# Patient Record
Sex: MALE | Race: WHITE | NOT HISPANIC OR LATINO | Employment: OTHER | URBAN - NONMETROPOLITAN AREA
[De-identification: names, ages, dates, MRNs, and addresses within clinical notes are randomized per-mention and may not be internally consistent; named-entity substitution may affect disease eponyms.]

---

## 2017-02-20 DIAGNOSIS — N40.0 BENIGN NON-NODULAR PROSTATIC HYPERPLASIA, PRESENCE OF LOWER URINARY TRACT SYMPTOMS UNSPECIFIED: Primary | ICD-10-CM

## 2017-02-21 RX ORDER — FINASTERIDE 5 MG/1
5 TABLET, FILM COATED ORAL DAILY
Qty: 90 TABLET | Refills: 3 | Status: SHIPPED | OUTPATIENT
Start: 2017-02-21 | End: 2018-03-30

## 2017-05-11 ENCOUNTER — LAB (OUTPATIENT)
Dept: LAB | Facility: HOSPITAL | Age: 77
End: 2017-05-11
Attending: INTERNAL MEDICINE

## 2017-05-11 ENCOUNTER — TRANSCRIBE ORDERS (OUTPATIENT)
Dept: ADMINISTRATIVE | Facility: HOSPITAL | Age: 77
End: 2017-05-11

## 2017-05-11 DIAGNOSIS — E11.9 DIABETES MELLITUS WITHOUT COMPLICATION (HCC): ICD-10-CM

## 2017-05-11 DIAGNOSIS — E11.9 DIABETES MELLITUS WITHOUT COMPLICATION (HCC): Primary | ICD-10-CM

## 2017-05-11 DIAGNOSIS — E55.9 UNSPECIFIED VITAMIN D DEFICIENCY: ICD-10-CM

## 2017-05-11 DIAGNOSIS — Z13.6 ENCOUNTER FOR SPECIAL SCREENING EXAMINATION FOR CARDIOVASCULAR DISORDER: ICD-10-CM

## 2017-05-11 DIAGNOSIS — R53.81 MALAISE AND FATIGUE: ICD-10-CM

## 2017-05-11 DIAGNOSIS — Z12.5 SPECIAL SCREENING FOR MALIGNANT NEOPLASM OF PROSTATE: ICD-10-CM

## 2017-05-11 DIAGNOSIS — R53.83 MALAISE AND FATIGUE: ICD-10-CM

## 2017-05-11 DIAGNOSIS — D64.9 ANEMIA, UNSPECIFIED: ICD-10-CM

## 2017-05-11 LAB
ALBUMIN SERPL-MCNC: 4.5 G/DL (ref 3.4–4.8)
ALBUMIN/GLOB SERPL: 1.5 G/DL (ref 1.5–2.5)
ALP SERPL-CCNC: 62 U/L (ref 40–129)
ALT SERPL W P-5'-P-CCNC: 17 U/L (ref 10–44)
ANION GAP SERPL CALCULATED.3IONS-SCNC: 2.2 MMOL/L (ref 3.6–11.2)
AST SERPL-CCNC: 20 U/L (ref 10–34)
BASOPHILS # BLD AUTO: 0.04 10*3/MM3 (ref 0–0.3)
BASOPHILS NFR BLD AUTO: 0.7 % (ref 0–2)
BILIRUB SERPL-MCNC: 0.9 MG/DL (ref 0.2–1.8)
BILIRUB UR QL STRIP: NEGATIVE
BUN BLD-MCNC: 19 MG/DL (ref 7–21)
BUN/CREAT SERPL: 26.4 (ref 7–25)
CALCIUM SPEC-SCNC: 10 MG/DL (ref 7.7–10)
CHLORIDE SERPL-SCNC: 110 MMOL/L (ref 99–112)
CHOLEST SERPL-MCNC: 201 MG/DL (ref 0–200)
CLARITY UR: CLEAR
CO2 SERPL-SCNC: 30.8 MMOL/L (ref 24.3–31.9)
COLOR UR: YELLOW
CREAT BLD-MCNC: 0.72 MG/DL (ref 0.43–1.29)
DEPRECATED RDW RBC AUTO: 45 FL (ref 37–54)
EOSINOPHIL # BLD AUTO: 0.2 10*3/MM3 (ref 0–0.7)
EOSINOPHIL NFR BLD AUTO: 3.5 % (ref 0–7)
ERYTHROCYTE [DISTWIDTH] IN BLOOD BY AUTOMATED COUNT: 13 % (ref 11.5–14.5)
GFR SERPL CREATININE-BSD FRML MDRD: 106 ML/MIN/1.73
GLOBULIN UR ELPH-MCNC: 3.1 GM/DL
GLUCOSE BLD-MCNC: 110 MG/DL (ref 70–110)
GLUCOSE UR STRIP-MCNC: NEGATIVE MG/DL
HBA1C MFR BLD: 6.2 % (ref 4.5–5.7)
HCT VFR BLD AUTO: 40.8 % (ref 42–52)
HDLC SERPL-MCNC: 82 MG/DL (ref 60–100)
HGB BLD-MCNC: 13.3 G/DL (ref 14–18)
HGB UR QL STRIP.AUTO: NEGATIVE
IMM GRANULOCYTES # BLD: 0.01 10*3/MM3 (ref 0–0.03)
IMM GRANULOCYTES NFR BLD: 0.2 % (ref 0–0.5)
KETONES UR QL STRIP: ABNORMAL
LDLC SERPL CALC-MCNC: 109 MG/DL (ref 0–100)
LDLC/HDLC SERPL: 1.33 {RATIO}
LEUKOCYTE ESTERASE UR QL STRIP.AUTO: NEGATIVE
LYMPHOCYTES # BLD AUTO: 2.8 10*3/MM3 (ref 1–3)
LYMPHOCYTES NFR BLD AUTO: 49.5 % (ref 16–46)
MCH RBC QN AUTO: 30.9 PG (ref 27–33)
MCHC RBC AUTO-ENTMCNC: 32.6 G/DL (ref 33–37)
MCV RBC AUTO: 94.9 FL (ref 80–94)
MONOCYTES # BLD AUTO: 0.78 10*3/MM3 (ref 0.1–0.9)
MONOCYTES NFR BLD AUTO: 13.8 % (ref 0–12)
NEUTROPHILS # BLD AUTO: 1.83 10*3/MM3 (ref 1.4–6.5)
NEUTROPHILS NFR BLD AUTO: 32.3 % (ref 40–75)
NITRITE UR QL STRIP: NEGATIVE
OSMOLALITY SERPL CALC.SUM OF ELEC: 287.9 MOSM/KG (ref 273–305)
PH UR STRIP.AUTO: 5.5 [PH] (ref 5–8)
PLATELET # BLD AUTO: 280 10*3/MM3 (ref 130–400)
PMV BLD AUTO: 10 FL (ref 6–10)
POTASSIUM BLD-SCNC: 4.5 MMOL/L (ref 3.5–5.3)
PROT SERPL-MCNC: 7.6 G/DL (ref 6–8)
PROT UR QL STRIP: NEGATIVE
PSA SERPL-MCNC: 1.08 NG/ML (ref 0–4)
RBC # BLD AUTO: 4.3 10*6/MM3 (ref 4.7–6.1)
SODIUM BLD-SCNC: 143 MMOL/L (ref 135–153)
SP GR UR STRIP: >=1.03 (ref 1–1.03)
T3RU NFR SERPL: 33.6 % (ref 22.5–37)
T4 SERPL-MCNC: 6.5 MCG/DL (ref 4.5–10.9)
TRIGL SERPL-MCNC: 48 MG/DL (ref 0–150)
TSH SERPL DL<=0.05 MIU/L-ACNC: 5.01 MIU/ML (ref 0.55–4.78)
UROBILINOGEN UR QL STRIP: ABNORMAL
VLDLC SERPL-MCNC: 9.6 MG/DL
WBC NRBC COR # BLD: 5.66 10*3/MM3 (ref 4.5–12.5)

## 2017-05-11 PROCEDURE — 84436 ASSAY OF TOTAL THYROXINE: CPT | Performed by: INTERNAL MEDICINE

## 2017-05-11 PROCEDURE — G0103 PSA SCREENING: HCPCS | Performed by: INTERNAL MEDICINE

## 2017-05-11 PROCEDURE — 83036 HEMOGLOBIN GLYCOSYLATED A1C: CPT | Performed by: INTERNAL MEDICINE

## 2017-05-11 PROCEDURE — 81003 URINALYSIS AUTO W/O SCOPE: CPT | Performed by: INTERNAL MEDICINE

## 2017-05-11 PROCEDURE — 80053 COMPREHEN METABOLIC PANEL: CPT | Performed by: INTERNAL MEDICINE

## 2017-05-11 PROCEDURE — 80061 LIPID PANEL: CPT | Performed by: INTERNAL MEDICINE

## 2017-05-11 PROCEDURE — 84479 ASSAY OF THYROID (T3 OR T4): CPT | Performed by: INTERNAL MEDICINE

## 2017-05-11 PROCEDURE — 36415 COLL VENOUS BLD VENIPUNCTURE: CPT

## 2017-05-11 PROCEDURE — 85025 COMPLETE CBC W/AUTO DIFF WBC: CPT | Performed by: INTERNAL MEDICINE

## 2017-05-11 PROCEDURE — 84443 ASSAY THYROID STIM HORMONE: CPT | Performed by: INTERNAL MEDICINE

## 2017-07-27 ENCOUNTER — OFFICE VISIT (OUTPATIENT)
Dept: SURGERY | Facility: CLINIC | Age: 77
End: 2017-07-27

## 2017-07-27 VITALS — BODY MASS INDEX: 24.77 KG/M2 | HEIGHT: 70 IN | WEIGHT: 173 LBS

## 2017-07-27 DIAGNOSIS — L98.9 SKIN LESIONS: Primary | ICD-10-CM

## 2017-07-27 PROCEDURE — 11602 EXC TR-EXT MAL+MARG 1.1-2 CM: CPT | Performed by: SURGERY

## 2017-07-27 PROCEDURE — 11622 EXC S/N/H/F/G MAL+MRG 1.1-2: CPT | Performed by: SURGERY

## 2017-07-27 RX ORDER — ASPIRIN 81 MG/1
81 TABLET ORAL DAILY
COMMUNITY
End: 2021-08-31 | Stop reason: SINTOL

## 2017-07-27 NOTE — PROGRESS NOTES
Subjective   Henry Olea is a 76 y.o. male.     History of Present Illness He noticed a lump on the dorsum of his right hand for a month that has grown and become ulcerated. He also has another lesion on his mid upper back. He has been off the aspirin a few days.     The following portions of the patient's history were reviewed and updated as appropriate: allergies, current medications, past family history, past medical history, past social history, past surgical history and problem list.    Review of Systems skin lesions.    Objective   Physical Exam 1.5 cm lesion dorsum of right hand and 1.3 cm lesion on upper mid back, both excised.     Assessment/Plan   Henry was seen today for skin lesion.    Diagnoses and all orders for this visit:    Skin lesions    Return 1 wk.

## 2017-08-10 ENCOUNTER — OFFICE VISIT (OUTPATIENT)
Dept: SURGERY | Facility: CLINIC | Age: 77
End: 2017-08-10

## 2017-08-10 VITALS — HEIGHT: 70 IN | BODY MASS INDEX: 24.77 KG/M2 | WEIGHT: 173 LBS

## 2017-08-10 DIAGNOSIS — Z48.89 SUTURE CHECK: Primary | ICD-10-CM

## 2017-08-10 PROBLEM — L98.9 SKIN LESIONS: Status: RESOLVED | Noted: 2017-07-27 | Resolved: 2017-08-10

## 2017-08-10 PROCEDURE — 99212 OFFICE O/P EST SF 10 MIN: CPT | Performed by: SURGERY

## 2017-08-10 NOTE — PROGRESS NOTES
Subjective   Henry Olea is a 76 y.o. male.     History of Present Illness His path showed basal cell cancer.     The following portions of the patient's history were reviewed and updated as appropriate: allergies, current medications, past family history, past medical history, past social history, past surgical history and problem list.    Review of Systems    Objective   Physical Exam wound ok . Sutures removed.     Assessment/Plan   Henry was seen today for follow-up.    Diagnoses and all orders for this visit:    Suture check    return prn

## 2017-10-11 ENCOUNTER — TRANSCRIBE ORDERS (OUTPATIENT)
Dept: ADMINISTRATIVE | Facility: HOSPITAL | Age: 77
End: 2017-10-11

## 2017-10-11 DIAGNOSIS — R01.1 UNDIAGNOSED CARDIAC MURMURS: Primary | ICD-10-CM

## 2017-10-11 DIAGNOSIS — R09.89 CAROTID BRUIT, UNSPECIFIED LATERALITY: ICD-10-CM

## 2017-10-30 ENCOUNTER — HOSPITAL ENCOUNTER (OUTPATIENT)
Dept: CARDIOLOGY | Facility: HOSPITAL | Age: 77
Discharge: HOME | End: 2017-10-30
Attending: INTERNAL MEDICINE | Admitting: INTERNAL MEDICINE

## 2017-10-30 ENCOUNTER — HOSPITAL ENCOUNTER (OUTPATIENT)
Dept: CARDIOLOGY | Facility: HOSPITAL | Age: 77
Discharge: HOME | End: 2017-10-30
Attending: INTERNAL MEDICINE

## 2017-10-30 DIAGNOSIS — R01.1 UNDIAGNOSED CARDIAC MURMURS: ICD-10-CM

## 2017-10-30 DIAGNOSIS — R09.89 CAROTID BRUIT, UNSPECIFIED LATERALITY: ICD-10-CM

## 2017-10-30 PROCEDURE — 93306 TTE W/DOPPLER COMPLETE: CPT | Performed by: INTERNAL MEDICINE

## 2017-10-30 PROCEDURE — 93880 EXTRACRANIAL BILAT STUDY: CPT | Performed by: RADIOLOGY

## 2017-10-30 PROCEDURE — 93880 EXTRACRANIAL BILAT STUDY: CPT

## 2017-10-30 PROCEDURE — 93306 TTE W/DOPPLER COMPLETE: CPT

## 2017-10-31 LAB
BH CV ECHO MEAS - % IVS THICK: 79.5 %
BH CV ECHO MEAS - % LVPW THICK: 130.3 %
BH CV ECHO MEAS - ACS: 2.6 CM
BH CV ECHO MEAS - AO ROOT AREA (BSA CORRECTED): 2
BH CV ECHO MEAS - AO ROOT AREA: 12 CM^2
BH CV ECHO MEAS - AO ROOT DIAM: 3.9 CM
BH CV ECHO MEAS - BSA(HAYCOCK): 2 M^2
BH CV ECHO MEAS - BSA: 2 M^2
BH CV ECHO MEAS - BZI_BMI: 24.8 KILOGRAMS/M^2
BH CV ECHO MEAS - BZI_METRIC_HEIGHT: 177.8 CM
BH CV ECHO MEAS - BZI_METRIC_WEIGHT: 78.5 KG
BH CV ECHO MEAS - CONTRAST EF 4CH: 71.9 ML/M^2
BH CV ECHO MEAS - EDV(CUBED): 170.8 ML
BH CV ECHO MEAS - EDV(MOD-SP4): 96 ML
BH CV ECHO MEAS - EDV(TEICH): 150.4 ML
BH CV ECHO MEAS - EF(CUBED): 70.5 %
BH CV ECHO MEAS - EF(TEICH): 61.6 %
BH CV ECHO MEAS - ESV(CUBED): 50.3 ML
BH CV ECHO MEAS - ESV(MOD-SP4): 27 ML
BH CV ECHO MEAS - ESV(TEICH): 57.8 ML
BH CV ECHO MEAS - FS: 33.5 %
BH CV ECHO MEAS - IVS/LVPW: 1
BH CV ECHO MEAS - IVSD: 1.1 CM
BH CV ECHO MEAS - IVSS: 2 CM
BH CV ECHO MEAS - LA DIMENSION: 5.3 CM
BH CV ECHO MEAS - LA/AO: 1.4
BH CV ECHO MEAS - LV DIASTOLIC VOL/BSA (35-75): 48.9 ML/M^2
BH CV ECHO MEAS - LV MASS(C)D: 240 GRAMS
BH CV ECHO MEAS - LV MASS(C)DI: 122.3 GRAMS/M^2
BH CV ECHO MEAS - LV MASS(C)S: 404.1 GRAMS
BH CV ECHO MEAS - LV MASS(C)SI: 205.9 GRAMS/M^2
BH CV ECHO MEAS - LV SYSTOLIC VOL/BSA (12-30): 13.8 ML/M^2
BH CV ECHO MEAS - LVIDD: 5.5 CM
BH CV ECHO MEAS - LVIDS: 3.7 CM
BH CV ECHO MEAS - LVLD AP4: 9.4 CM
BH CV ECHO MEAS - LVLS AP4: 7.6 CM
BH CV ECHO MEAS - LVOT AREA (M): 4.2 CM^2
BH CV ECHO MEAS - LVOT AREA: 4.2 CM^2
BH CV ECHO MEAS - LVOT DIAM: 2.3 CM
BH CV ECHO MEAS - LVPWD: 1.1 CM
BH CV ECHO MEAS - LVPWS: 2.5 CM
BH CV ECHO MEAS - MV A MAX VEL: 84.4 CM/SEC
BH CV ECHO MEAS - MV E MAX VEL: 57.8 CM/SEC
BH CV ECHO MEAS - MV E/A: 0.68
BH CV ECHO MEAS - RVDD: 2.9 CM
BH CV ECHO MEAS - SI(CUBED): 61.4 ML/M^2
BH CV ECHO MEAS - SI(MOD-SP4): 35.2 ML/M^2
BH CV ECHO MEAS - SI(TEICH): 47.2 ML/M^2
BH CV ECHO MEAS - SV(CUBED): 120.5 ML
BH CV ECHO MEAS - SV(MOD-SP4): 69 ML
BH CV ECHO MEAS - SV(TEICH): 92.6 ML

## 2018-01-22 ENCOUNTER — TRANSCRIBE ORDERS (OUTPATIENT)
Dept: ADMINISTRATIVE | Facility: HOSPITAL | Age: 78
End: 2018-01-22

## 2018-01-22 ENCOUNTER — LAB (OUTPATIENT)
Dept: LAB | Facility: HOSPITAL | Age: 78
End: 2018-01-22
Attending: INTERNAL MEDICINE

## 2018-01-22 DIAGNOSIS — R53.83 FATIGUE, UNSPECIFIED TYPE: ICD-10-CM

## 2018-01-22 DIAGNOSIS — R53.81 MALAISE: ICD-10-CM

## 2018-01-22 DIAGNOSIS — R53.81 DEBILITY: Primary | ICD-10-CM

## 2018-01-22 LAB
ALBUMIN SERPL-MCNC: 4.8 G/DL (ref 3.4–4.8)
ALBUMIN/GLOB SERPL: 1.5 G/DL (ref 1.5–2.5)
ALP SERPL-CCNC: 79 U/L (ref 40–129)
ALT SERPL W P-5'-P-CCNC: 17 U/L (ref 10–44)
ANION GAP SERPL CALCULATED.3IONS-SCNC: 5.9 MMOL/L (ref 3.6–11.2)
AST SERPL-CCNC: 18 U/L (ref 10–34)
BASOPHILS # BLD AUTO: 0.07 10*3/MM3 (ref 0–0.3)
BASOPHILS NFR BLD AUTO: 0.7 % (ref 0–2)
BILIRUB SERPL-MCNC: 0.6 MG/DL (ref 0.2–1.8)
BUN BLD-MCNC: 19 MG/DL (ref 7–21)
BUN/CREAT SERPL: 21.8 (ref 7–25)
CALCIUM SPEC-SCNC: 9.9 MG/DL (ref 7.7–10)
CHLORIDE SERPL-SCNC: 108 MMOL/L (ref 99–112)
CO2 SERPL-SCNC: 29.1 MMOL/L (ref 24.3–31.9)
CREAT BLD-MCNC: 0.87 MG/DL (ref 0.43–1.29)
CRP SERPL-MCNC: 5.57 MG/DL (ref 0–0.99)
DEPRECATED RDW RBC AUTO: 42.1 FL (ref 37–54)
EOSINOPHIL # BLD AUTO: 0.17 10*3/MM3 (ref 0–0.7)
EOSINOPHIL NFR BLD AUTO: 1.7 % (ref 0–7)
ERYTHROCYTE [DISTWIDTH] IN BLOOD BY AUTOMATED COUNT: 12.4 % (ref 11.5–14.5)
GFR SERPL CREATININE-BSD FRML MDRD: 85 ML/MIN/1.73
GLOBULIN UR ELPH-MCNC: 3.1 GM/DL
GLUCOSE BLD-MCNC: 119 MG/DL (ref 70–110)
HCT VFR BLD AUTO: 40.1 % (ref 42–52)
HGB BLD-MCNC: 13.2 G/DL (ref 14–18)
IMM GRANULOCYTES # BLD: 0.02 10*3/MM3 (ref 0–0.03)
IMM GRANULOCYTES NFR BLD: 0.2 % (ref 0–0.5)
LYMPHOCYTES # BLD AUTO: 2.9 10*3/MM3 (ref 1–3)
LYMPHOCYTES NFR BLD AUTO: 29.5 % (ref 16–46)
MCH RBC QN AUTO: 31.5 PG (ref 27–33)
MCHC RBC AUTO-ENTMCNC: 32.9 G/DL (ref 33–37)
MCV RBC AUTO: 95.7 FL (ref 80–94)
MONOCYTES # BLD AUTO: 1.33 10*3/MM3 (ref 0.1–0.9)
MONOCYTES NFR BLD AUTO: 13.5 % (ref 0–12)
NEUTROPHILS # BLD AUTO: 5.35 10*3/MM3 (ref 1.4–6.5)
NEUTROPHILS NFR BLD AUTO: 54.4 % (ref 40–75)
OSMOLALITY SERPL CALC.SUM OF ELEC: 288.4 MOSM/KG (ref 273–305)
PLATELET # BLD AUTO: 390 10*3/MM3 (ref 130–400)
PMV BLD AUTO: 9.3 FL (ref 6–10)
POTASSIUM BLD-SCNC: 4 MMOL/L (ref 3.5–5.3)
PROT SERPL-MCNC: 7.9 G/DL (ref 6–8)
RBC # BLD AUTO: 4.19 10*6/MM3 (ref 4.7–6.1)
SODIUM BLD-SCNC: 143 MMOL/L (ref 135–153)
WBC NRBC COR # BLD: 9.84 10*3/MM3 (ref 4.5–12.5)

## 2018-01-22 PROCEDURE — 86140 C-REACTIVE PROTEIN: CPT

## 2018-01-22 PROCEDURE — 85025 COMPLETE CBC W/AUTO DIFF WBC: CPT

## 2018-01-22 PROCEDURE — 36415 COLL VENOUS BLD VENIPUNCTURE: CPT

## 2018-01-22 PROCEDURE — 80053 COMPREHEN METABOLIC PANEL: CPT

## 2018-03-30 ENCOUNTER — LAB (OUTPATIENT)
Dept: LAB | Facility: HOSPITAL | Age: 78
End: 2018-03-30
Attending: INTERNAL MEDICINE

## 2018-03-30 ENCOUNTER — TRANSCRIBE ORDERS (OUTPATIENT)
Dept: ADMINISTRATIVE | Facility: HOSPITAL | Age: 78
End: 2018-03-30

## 2018-03-30 DIAGNOSIS — E55.9 VITAMIN D DEFICIENCY: ICD-10-CM

## 2018-03-30 DIAGNOSIS — D64.9 ANEMIA, UNSPECIFIED TYPE: ICD-10-CM

## 2018-03-30 DIAGNOSIS — Z13.6 SCREENING FOR ISCHEMIC HEART DISEASE: ICD-10-CM

## 2018-03-30 DIAGNOSIS — N40.0 BENIGN PROSTATIC HYPERPLASIA WITHOUT LOWER URINARY TRACT SYMPTOMS: Primary | ICD-10-CM

## 2018-03-30 DIAGNOSIS — R53.82 CHRONIC FATIGUE AND MALAISE: ICD-10-CM

## 2018-03-30 DIAGNOSIS — Z12.5 SPECIAL SCREENING, PROSTATE CANCER: ICD-10-CM

## 2018-03-30 DIAGNOSIS — E11.9 DIABETES MELLITUS WITHOUT COMPLICATION (HCC): ICD-10-CM

## 2018-03-30 DIAGNOSIS — N40.2 NODULAR PROSTATE WITHOUT URINARY OBSTRUCTION: ICD-10-CM

## 2018-03-30 DIAGNOSIS — E11.9 DIABETES MELLITUS WITHOUT COMPLICATION (HCC): Primary | ICD-10-CM

## 2018-03-30 DIAGNOSIS — R53.81 CHRONIC FATIGUE AND MALAISE: ICD-10-CM

## 2018-03-30 LAB
25(OH)D3 SERPL-MCNC: 31 NG/ML
ALBUMIN SERPL-MCNC: 4.3 G/DL (ref 3.4–4.8)
ALBUMIN/GLOB SERPL: 1.5 G/DL (ref 1.5–2.5)
ALP SERPL-CCNC: 58 U/L (ref 40–129)
ALT SERPL W P-5'-P-CCNC: 25 U/L (ref 10–44)
ANION GAP SERPL CALCULATED.3IONS-SCNC: 6.6 MMOL/L (ref 3.6–11.2)
AST SERPL-CCNC: 19 U/L (ref 10–34)
BASOPHILS # BLD AUTO: 0.04 10*3/MM3 (ref 0–0.3)
BASOPHILS NFR BLD AUTO: 0.8 % (ref 0–2)
BILIRUB SERPL-MCNC: 0.7 MG/DL (ref 0.2–1.8)
BILIRUB UR QL STRIP: NEGATIVE
BUN BLD-MCNC: 22 MG/DL (ref 7–21)
BUN/CREAT SERPL: 29.7 (ref 7–25)
CALCIUM SPEC-SCNC: 9.7 MG/DL (ref 7.7–10)
CHLORIDE SERPL-SCNC: 106 MMOL/L (ref 99–112)
CHOLEST SERPL-MCNC: 160 MG/DL (ref 0–200)
CLARITY UR: CLEAR
CO2 SERPL-SCNC: 29.4 MMOL/L (ref 24.3–31.9)
COLOR UR: YELLOW
CREAT BLD-MCNC: 0.74 MG/DL (ref 0.43–1.29)
DEPRECATED RDW RBC AUTO: 44.4 FL (ref 37–54)
EOSINOPHIL # BLD AUTO: 0.21 10*3/MM3 (ref 0–0.7)
EOSINOPHIL NFR BLD AUTO: 4.2 % (ref 0–7)
ERYTHROCYTE [DISTWIDTH] IN BLOOD BY AUTOMATED COUNT: 13.3 % (ref 11.5–14.5)
FOLATE SERPL-MCNC: 18.65 NG/ML (ref 5.4–20)
GFR SERPL CREATININE-BSD FRML MDRD: 103 ML/MIN/1.73
GLOBULIN UR ELPH-MCNC: 2.8 GM/DL
GLUCOSE BLD-MCNC: 108 MG/DL (ref 70–110)
GLUCOSE UR STRIP-MCNC: NEGATIVE MG/DL
HBA1C MFR BLD: 5.9 % (ref 4.5–5.7)
HCT VFR BLD AUTO: 38.9 % (ref 42–52)
HDLC SERPL-MCNC: 70 MG/DL (ref 60–100)
HGB BLD-MCNC: 12.6 G/DL (ref 14–18)
HGB UR QL STRIP.AUTO: NEGATIVE
IMM GRANULOCYTES # BLD: 0.01 10*3/MM3 (ref 0–0.03)
IMM GRANULOCYTES NFR BLD: 0.2 % (ref 0–0.5)
KETONES UR QL STRIP: NEGATIVE
LDLC SERPL CALC-MCNC: 81 MG/DL (ref 0–100)
LDLC/HDLC SERPL: 1.15 {RATIO}
LEUKOCYTE ESTERASE UR QL STRIP.AUTO: NEGATIVE
LYMPHOCYTES # BLD AUTO: 2.35 10*3/MM3 (ref 1–3)
LYMPHOCYTES NFR BLD AUTO: 47.2 % (ref 16–46)
MCH RBC QN AUTO: 31 PG (ref 27–33)
MCHC RBC AUTO-ENTMCNC: 32.4 G/DL (ref 33–37)
MCV RBC AUTO: 95.6 FL (ref 80–94)
MONOCYTES # BLD AUTO: 0.55 10*3/MM3 (ref 0.1–0.9)
MONOCYTES NFR BLD AUTO: 11 % (ref 0–12)
NEUTROPHILS # BLD AUTO: 1.82 10*3/MM3 (ref 1.4–6.5)
NEUTROPHILS NFR BLD AUTO: 36.6 % (ref 40–75)
NITRITE UR QL STRIP: NEGATIVE
OSMOLALITY SERPL CALC.SUM OF ELEC: 287 MOSM/KG (ref 273–305)
PH UR STRIP.AUTO: 5.5 [PH] (ref 5–8)
PLATELET # BLD AUTO: 322 10*3/MM3 (ref 130–400)
PMV BLD AUTO: 9.9 FL (ref 6–10)
POTASSIUM BLD-SCNC: 4.2 MMOL/L (ref 3.5–5.3)
PROT SERPL-MCNC: 7.1 G/DL (ref 6–8)
PROT UR QL STRIP: NEGATIVE
PSA SERPL-MCNC: 0.89 NG/ML (ref 0–4)
RBC # BLD AUTO: 4.07 10*6/MM3 (ref 4.7–6.1)
SODIUM BLD-SCNC: 142 MMOL/L (ref 135–153)
SP GR UR STRIP: 1.02 (ref 1–1.03)
TRIGL SERPL-MCNC: 46 MG/DL (ref 0–150)
TSH SERPL DL<=0.05 MIU/L-ACNC: 4.16 MIU/ML (ref 0.55–4.78)
UROBILINOGEN UR QL STRIP: NORMAL
VIT B12 BLD-MCNC: 533 PG/ML (ref 211–911)
VLDLC SERPL-MCNC: 9.2 MG/DL
WBC NRBC COR # BLD: 4.98 10*3/MM3 (ref 4.5–12.5)

## 2018-03-30 PROCEDURE — 82306 VITAMIN D 25 HYDROXY: CPT

## 2018-03-30 PROCEDURE — 83036 HEMOGLOBIN GLYCOSYLATED A1C: CPT

## 2018-03-30 PROCEDURE — 85025 COMPLETE CBC W/AUTO DIFF WBC: CPT

## 2018-03-30 PROCEDURE — 80061 LIPID PANEL: CPT

## 2018-03-30 PROCEDURE — 82746 ASSAY OF FOLIC ACID SERUM: CPT

## 2018-03-30 PROCEDURE — 81003 URINALYSIS AUTO W/O SCOPE: CPT

## 2018-03-30 PROCEDURE — 80053 COMPREHEN METABOLIC PANEL: CPT

## 2018-03-30 PROCEDURE — G0103 PSA SCREENING: HCPCS

## 2018-03-30 PROCEDURE — 82607 VITAMIN B-12: CPT

## 2018-03-30 PROCEDURE — 36415 COLL VENOUS BLD VENIPUNCTURE: CPT

## 2018-03-30 PROCEDURE — 84443 ASSAY THYROID STIM HORMONE: CPT

## 2018-03-30 RX ORDER — FINASTERIDE 5 MG/1
5 TABLET, FILM COATED ORAL DAILY
Qty: 90 TABLET | Refills: 3 | Status: SHIPPED | OUTPATIENT
Start: 2018-03-30 | End: 2019-04-22

## 2018-12-13 ENCOUNTER — TRANSCRIBE ORDERS (OUTPATIENT)
Dept: ADMINISTRATIVE | Facility: HOSPITAL | Age: 78
End: 2018-12-13

## 2018-12-13 DIAGNOSIS — E55.9 VITAMIN D DEFICIENCY: ICD-10-CM

## 2018-12-13 DIAGNOSIS — E11.9 DIABETES MELLITUS WITHOUT COMPLICATION (HCC): ICD-10-CM

## 2018-12-13 DIAGNOSIS — R53.82 CHRONIC FATIGUE: ICD-10-CM

## 2018-12-13 DIAGNOSIS — D50.9 IRON DEFICIENCY ANEMIA, UNSPECIFIED IRON DEFICIENCY ANEMIA TYPE: Primary | ICD-10-CM

## 2018-12-13 DIAGNOSIS — N40.1 BENIGN PROSTATIC HYPERPLASIA (BPH) WITH STRAINING ON URINATION: ICD-10-CM

## 2018-12-13 DIAGNOSIS — R39.16 BENIGN PROSTATIC HYPERPLASIA (BPH) WITH STRAINING ON URINATION: ICD-10-CM

## 2018-12-14 ENCOUNTER — LAB (OUTPATIENT)
Dept: LAB | Facility: HOSPITAL | Age: 78
End: 2018-12-14
Attending: INTERNAL MEDICINE

## 2018-12-14 DIAGNOSIS — N40.1 BENIGN PROSTATIC HYPERPLASIA (BPH) WITH STRAINING ON URINATION: ICD-10-CM

## 2018-12-14 DIAGNOSIS — E55.9 VITAMIN D DEFICIENCY: ICD-10-CM

## 2018-12-14 DIAGNOSIS — R39.16 BENIGN PROSTATIC HYPERPLASIA (BPH) WITH STRAINING ON URINATION: ICD-10-CM

## 2018-12-14 DIAGNOSIS — E11.9 DIABETES MELLITUS WITHOUT COMPLICATION (HCC): ICD-10-CM

## 2018-12-14 DIAGNOSIS — R53.82 CHRONIC FATIGUE: ICD-10-CM

## 2018-12-14 DIAGNOSIS — D50.9 IRON DEFICIENCY ANEMIA, UNSPECIFIED IRON DEFICIENCY ANEMIA TYPE: ICD-10-CM

## 2018-12-14 LAB
ALBUMIN SERPL-MCNC: 4.5 G/DL (ref 3.4–4.8)
ALBUMIN UR-MCNC: 2.4 MG/L
ALBUMIN/GLOB SERPL: 1.7 G/DL (ref 1.5–2.5)
ALP SERPL-CCNC: 60 U/L (ref 40–129)
ALT SERPL W P-5'-P-CCNC: 22 U/L (ref 10–44)
ANION GAP SERPL CALCULATED.3IONS-SCNC: 5.9 MMOL/L (ref 3.6–11.2)
AST SERPL-CCNC: 18 U/L (ref 10–34)
BASOPHILS # BLD AUTO: 0.05 10*3/MM3 (ref 0–0.3)
BASOPHILS NFR BLD AUTO: 1 % (ref 0–2)
BILIRUB SERPL-MCNC: 1.1 MG/DL (ref 0.2–1.8)
BUN BLD-MCNC: 21 MG/DL (ref 7–21)
BUN/CREAT SERPL: 27.6 (ref 7–25)
CALCIUM SPEC-SCNC: 9.3 MG/DL (ref 7.7–10)
CHLORIDE SERPL-SCNC: 107 MMOL/L (ref 99–112)
CHOLEST SERPL-MCNC: 179 MG/DL (ref 0–200)
CO2 SERPL-SCNC: 30.1 MMOL/L (ref 24.3–31.9)
CREAT BLD-MCNC: 0.76 MG/DL (ref 0.43–1.29)
CRP SERPL-MCNC: <0.5 MG/DL (ref 0–0.99)
DEPRECATED RDW RBC AUTO: 43.2 FL (ref 37–54)
EOSINOPHIL # BLD AUTO: 0.16 10*3/MM3 (ref 0–0.7)
EOSINOPHIL NFR BLD AUTO: 3.1 % (ref 0–7)
ERYTHROCYTE [DISTWIDTH] IN BLOOD BY AUTOMATED COUNT: 12.8 % (ref 11.5–14.5)
FOLATE SERPL-MCNC: 23.25 NG/ML (ref 5.4–20)
GFR SERPL CREATININE-BSD FRML MDRD: 99 ML/MIN/1.73
GLOBULIN UR ELPH-MCNC: 2.7 GM/DL
GLUCOSE BLD-MCNC: 111 MG/DL (ref 70–110)
HBA1C MFR BLD: 6.1 % (ref 4.5–5.7)
HCT VFR BLD AUTO: 40.7 % (ref 42–52)
HDLC SERPL-MCNC: 69 MG/DL (ref 60–100)
HGB BLD-MCNC: 13 G/DL (ref 14–18)
IMM GRANULOCYTES # BLD: 0.01 10*3/MM3 (ref 0–0.03)
IMM GRANULOCYTES NFR BLD: 0.2 % (ref 0–0.5)
LDLC SERPL CALC-MCNC: 100 MG/DL (ref 0–100)
LDLC/HDLC SERPL: 1.46 {RATIO}
LYMPHOCYTES # BLD AUTO: 2.2 10*3/MM3 (ref 1–3)
LYMPHOCYTES NFR BLD AUTO: 42 % (ref 16–46)
MCH RBC QN AUTO: 30.8 PG (ref 27–33)
MCHC RBC AUTO-ENTMCNC: 31.9 G/DL (ref 33–37)
MCV RBC AUTO: 96.4 FL (ref 80–94)
MONOCYTES # BLD AUTO: 0.58 10*3/MM3 (ref 0.1–0.9)
MONOCYTES NFR BLD AUTO: 11.1 % (ref 0–12)
NEUTROPHILS # BLD AUTO: 2.24 10*3/MM3 (ref 1.4–6.5)
NEUTROPHILS NFR BLD AUTO: 42.6 % (ref 40–75)
OSMOLALITY SERPL CALC.SUM OF ELEC: 288.6 MOSM/KG (ref 273–305)
PLATELET # BLD AUTO: 333 10*3/MM3 (ref 130–400)
PMV BLD AUTO: 9.6 FL (ref 6–10)
POTASSIUM BLD-SCNC: 4.4 MMOL/L (ref 3.5–5.3)
PROT SERPL-MCNC: 7.2 G/DL (ref 6–8)
PSA SERPL-MCNC: 0.94 NG/ML (ref 0–4)
RBC # BLD AUTO: 4.22 10*6/MM3 (ref 4.7–6.1)
SODIUM BLD-SCNC: 143 MMOL/L (ref 135–153)
TRIGL SERPL-MCNC: 48 MG/DL (ref 0–150)
VIT B12 BLD-MCNC: 637 PG/ML (ref 211–911)
VLDLC SERPL-MCNC: 9.6 MG/DL
WBC NRBC COR # BLD: 5.24 10*3/MM3 (ref 4.5–12.5)

## 2018-12-14 PROCEDURE — 84153 ASSAY OF PSA TOTAL: CPT

## 2018-12-14 PROCEDURE — 80061 LIPID PANEL: CPT

## 2018-12-14 PROCEDURE — 84681 ASSAY OF C-PEPTIDE: CPT

## 2018-12-14 PROCEDURE — 36415 COLL VENOUS BLD VENIPUNCTURE: CPT

## 2018-12-14 PROCEDURE — 82043 UR ALBUMIN QUANTITATIVE: CPT

## 2018-12-14 PROCEDURE — 82607 VITAMIN B-12: CPT

## 2018-12-14 PROCEDURE — 86140 C-REACTIVE PROTEIN: CPT

## 2018-12-14 PROCEDURE — 80053 COMPREHEN METABOLIC PANEL: CPT

## 2018-12-14 PROCEDURE — 83036 HEMOGLOBIN GLYCOSYLATED A1C: CPT

## 2018-12-14 PROCEDURE — 82746 ASSAY OF FOLIC ACID SERUM: CPT

## 2018-12-14 PROCEDURE — 85025 COMPLETE CBC W/AUTO DIFF WBC: CPT

## 2018-12-15 LAB — C PEPTIDE SERPL-MCNC: 1.7 NG/ML (ref 1.1–4.4)

## 2019-04-22 DIAGNOSIS — N40.0 BENIGN PROSTATIC HYPERPLASIA WITHOUT LOWER URINARY TRACT SYMPTOMS: ICD-10-CM

## 2019-04-22 RX ORDER — FINASTERIDE 5 MG/1
5 TABLET, FILM COATED ORAL DAILY
Qty: 90 TABLET | Refills: 3 | Status: SHIPPED | OUTPATIENT
Start: 2019-04-22 | End: 2020-05-14

## 2019-07-19 ENCOUNTER — TRANSCRIBE ORDERS (OUTPATIENT)
Dept: ADMINISTRATIVE | Facility: HOSPITAL | Age: 79
End: 2019-07-19

## 2019-07-19 ENCOUNTER — LAB (OUTPATIENT)
Dept: LAB | Facility: HOSPITAL | Age: 79
End: 2019-07-19

## 2019-07-19 DIAGNOSIS — D50.9 IRON DEFICIENCY ANEMIA, UNSPECIFIED IRON DEFICIENCY ANEMIA TYPE: ICD-10-CM

## 2019-07-19 DIAGNOSIS — M25.50 PAIN IN JOINT, MULTIPLE SITES: ICD-10-CM

## 2019-07-19 DIAGNOSIS — E78.2 MIXED HYPERLIPIDEMIA: ICD-10-CM

## 2019-07-19 DIAGNOSIS — E11.9 DIABETES MELLITUS WITHOUT COMPLICATION (HCC): ICD-10-CM

## 2019-07-19 DIAGNOSIS — D50.9 IRON DEFICIENCY ANEMIA, UNSPECIFIED IRON DEFICIENCY ANEMIA TYPE: Primary | ICD-10-CM

## 2019-07-19 LAB
ALBUMIN SERPL-MCNC: 4.18 G/DL (ref 3.5–5.2)
ALBUMIN/GLOB SERPL: 1.4 G/DL
ALP SERPL-CCNC: 64 U/L (ref 39–117)
ALT SERPL W P-5'-P-CCNC: 15 U/L (ref 1–41)
ANION GAP SERPL CALCULATED.3IONS-SCNC: 10 MMOL/L (ref 5–15)
AST SERPL-CCNC: 13 U/L (ref 1–40)
BASOPHILS # BLD AUTO: 0.06 10*3/MM3 (ref 0–0.2)
BASOPHILS NFR BLD AUTO: 1 % (ref 0–1.5)
BILIRUB SERPL-MCNC: 0.5 MG/DL (ref 0.2–1.2)
BUN BLD-MCNC: 25 MG/DL (ref 8–23)
BUN/CREAT SERPL: 31.6 (ref 7–25)
CALCIUM SPEC-SCNC: 9.6 MG/DL (ref 8.6–10.5)
CHLORIDE SERPL-SCNC: 104 MMOL/L (ref 98–107)
CHOLEST SERPL-MCNC: 163 MG/DL (ref 0–200)
CO2 SERPL-SCNC: 27 MMOL/L (ref 22–29)
CREAT BLD-MCNC: 0.79 MG/DL (ref 0.76–1.27)
DEPRECATED RDW RBC AUTO: 43.6 FL (ref 37–54)
EOSINOPHIL # BLD AUTO: 0.21 10*3/MM3 (ref 0–0.4)
EOSINOPHIL NFR BLD AUTO: 3.6 % (ref 0.3–6.2)
ERYTHROCYTE [DISTWIDTH] IN BLOOD BY AUTOMATED COUNT: 13 % (ref 12.3–15.4)
GFR SERPL CREATININE-BSD FRML MDRD: 95 ML/MIN/1.73
GLOBULIN UR ELPH-MCNC: 2.9 GM/DL
GLUCOSE BLD-MCNC: 119 MG/DL (ref 65–99)
HBA1C MFR BLD: 6.1 % (ref 4.8–5.6)
HCT VFR BLD AUTO: 40.9 % (ref 37.5–51)
HDLC SERPL-MCNC: 71 MG/DL (ref 40–60)
HGB BLD-MCNC: 13.2 G/DL (ref 13–17.7)
IMM GRANULOCYTES # BLD AUTO: 0 10*3/MM3 (ref 0–0.05)
IMM GRANULOCYTES NFR BLD AUTO: 0 % (ref 0–0.5)
LDLC SERPL CALC-MCNC: 80 MG/DL (ref 0–100)
LDLC/HDLC SERPL: 1.13 {RATIO}
LYMPHOCYTES # BLD AUTO: 2.56 10*3/MM3 (ref 0.7–3.1)
LYMPHOCYTES NFR BLD AUTO: 43.3 % (ref 19.6–45.3)
MCH RBC QN AUTO: 31 PG (ref 26.6–33)
MCHC RBC AUTO-ENTMCNC: 32.3 G/DL (ref 31.5–35.7)
MCV RBC AUTO: 96 FL (ref 79–97)
MONOCYTES # BLD AUTO: 0.61 10*3/MM3 (ref 0.1–0.9)
MONOCYTES NFR BLD AUTO: 10.3 % (ref 5–12)
NEUTROPHILS # BLD AUTO: 2.47 10*3/MM3 (ref 1.7–7)
NEUTROPHILS NFR BLD AUTO: 41.8 % (ref 42.7–76)
PLATELET # BLD AUTO: 309 10*3/MM3 (ref 140–450)
PMV BLD AUTO: 9.7 FL (ref 6–12)
POTASSIUM BLD-SCNC: 4.7 MMOL/L (ref 3.5–5.2)
PROT SERPL-MCNC: 7.1 G/DL (ref 6–8.5)
PSA SERPL-MCNC: 1.15 NG/ML (ref 0–4)
RBC # BLD AUTO: 4.26 10*6/MM3 (ref 4.14–5.8)
SODIUM BLD-SCNC: 141 MMOL/L (ref 136–145)
TRIGL SERPL-MCNC: 58 MG/DL (ref 0–150)
URATE SERPL-MCNC: 4.2 MG/DL (ref 3.4–7)
VLDLC SERPL-MCNC: 11.6 MG/DL
WBC NRBC COR # BLD: 5.91 10*3/MM3 (ref 3.4–10.8)

## 2019-07-19 PROCEDURE — 80061 LIPID PANEL: CPT

## 2019-07-19 PROCEDURE — 84153 ASSAY OF PSA TOTAL: CPT

## 2019-07-19 PROCEDURE — 80053 COMPREHEN METABOLIC PANEL: CPT

## 2019-07-19 PROCEDURE — 84550 ASSAY OF BLOOD/URIC ACID: CPT

## 2019-07-19 PROCEDURE — 85025 COMPLETE CBC W/AUTO DIFF WBC: CPT

## 2019-07-19 PROCEDURE — 36415 COLL VENOUS BLD VENIPUNCTURE: CPT

## 2019-07-19 PROCEDURE — 83036 HEMOGLOBIN GLYCOSYLATED A1C: CPT

## 2019-11-01 ENCOUNTER — OFFICE VISIT (OUTPATIENT)
Dept: SURGERY | Facility: CLINIC | Age: 79
End: 2019-11-01

## 2019-11-01 DIAGNOSIS — L98.9 SKIN LESION: Primary | ICD-10-CM

## 2019-11-01 PROCEDURE — 11401 EXC TR-EXT B9+MARG 0.6-1 CM: CPT | Performed by: SURGERY

## 2019-11-01 NOTE — PROGRESS NOTES
Subjective   Henry Olea is a 78 y.o. male.     Chief Complaint: dark skin lesion on chest      History of Present Illness He noticed a dark spot on his left upper chest that came up suddenly a week or so ago. He is on aspirin and it had itched some, but he did not remember having anything there previously. He has had prior skin cancer.    The following portions of the patient's history were reviewed and updated as appropriate: current medications, past family history, past medical history, past social history, past surgical history and problem list.    Review of Systems skin lesion    Objective   Physical Exam 5 mm dark spot on left upper chest. It is slightly irregular in shape. It was excised with lidocaine and nylon sutures.    Assessment/Plan   Henry was seen today for skin lesion.    Diagnoses and all orders for this visit:    Skin lesion    Follow up on the path and remove sutures in 1 wk.

## 2020-01-30 ENCOUNTER — OFFICE VISIT (OUTPATIENT)
Dept: SURGERY | Facility: CLINIC | Age: 80
End: 2020-01-30

## 2020-01-30 DIAGNOSIS — L98.9 SKIN LESION: Primary | ICD-10-CM

## 2020-01-30 PROCEDURE — 11400 EXC TR-EXT B9+MARG 0.5 CM<: CPT | Performed by: SURGERY

## 2020-01-30 PROCEDURE — 11601 EXC TR-EXT MAL+MARG 0.6-1 CM: CPT | Performed by: SURGERY

## 2020-01-30 NOTE — PROGRESS NOTES
Subjective   Henry Olea is a 79 y.o. male.     Chief Complaint: suspicious skin lesion on right shoulder    History of Present Illness He has a rough raised area on top of the right shoulder. It has been growing over the last few months.      The following portions of the patient's history were reviewed and updated as appropriate: current medications, past family history, past medical history, past social history, past surgical history and problem list.    Review of Systems    Objective   Physical Exam suspicious scabbed lesion 7 mm on top of right shoulder. He also has a smaller 4 mm one about 2 inches lateral to it. Both were excised and the larger one sent for pathology.  Nylon suture and lidocaine used.  The larger excision site was about 1.8 x 1 cm, and the smaller one was 1 x 1.3 cm    Assessment/Plan   Henry was seen today for skin lesion.    Diagnoses and all orders for this visit:    Skin lesion    keep sutures in for 10-12 days.

## 2020-04-09 ENCOUNTER — TRANSCRIBE ORDERS (OUTPATIENT)
Dept: ADMINISTRATIVE | Facility: HOSPITAL | Age: 80
End: 2020-04-09

## 2020-04-09 DIAGNOSIS — M76.62 ACHILLES TENDINITIS OF LEFT LOWER EXTREMITY: Primary | ICD-10-CM

## 2020-04-14 ENCOUNTER — HOSPITAL ENCOUNTER (OUTPATIENT)
Dept: MRI IMAGING | Facility: HOSPITAL | Age: 80
Discharge: HOME | End: 2020-04-14
Admitting: INTERNAL MEDICINE

## 2020-04-14 DIAGNOSIS — M76.62 ACHILLES TENDINITIS OF LEFT LOWER EXTREMITY: ICD-10-CM

## 2020-04-14 PROCEDURE — 73721 MRI JNT OF LWR EXTRE W/O DYE: CPT | Performed by: RADIOLOGY

## 2020-04-14 PROCEDURE — 73721 MRI JNT OF LWR EXTRE W/O DYE: CPT

## 2020-04-15 ENCOUNTER — OFFICE VISIT (OUTPATIENT)
Dept: ORTHOPEDIC SURGERY | Facility: CLINIC | Age: 80
End: 2020-04-15

## 2020-04-15 DIAGNOSIS — S86.012A PARTIAL TEAR OF LEFT ACHILLES TENDON, INITIAL ENCOUNTER: Primary | ICD-10-CM

## 2020-04-15 PROCEDURE — 99203 OFFICE O/P NEW LOW 30 MIN: CPT | Performed by: ORTHOPAEDIC SURGERY

## 2020-04-15 NOTE — PROGRESS NOTES
New Patient Visit      Patient: Henry Olea  YOB: 1940  Date of Encounter: 04/15/2020        Chief Complaint:   Chief Complaint   Patient presents with   • Left Ankle - Pain, Edema, Initial Evaluation           HPI:   Henry Olea, 79 y.o. male, referred by Jimmy Zhang MD presents evaluation of left ankle injury sustained initially in March 2020 he has had 3 episodes of worsening pain posterior aspect of his left ankle.  He continues to ambulate in regular shoewear.  He has been very active he is injured his self while playing tennis and hiking.  He reports his pain is better today.  He denies weakness or numbness to his left leg.        Active Problem List:  Patient Active Problem List   Diagnosis   • Skin lesion   • Suture check           Past Medical History:  History reviewed. No pertinent past medical history.        Past Surgical History:  Past Surgical History:   Procedure Laterality Date   • BACK SURGERY     • SHOULDER SURGERY             Family History:  History reviewed. No pertinent family history.        Social History:  Social History     Socioeconomic History   • Marital status:      Spouse name: Not on file   • Number of children: Not on file   • Years of education: Not on file   • Highest education level: Not on file   Tobacco Use   • Smoking status: Never Smoker   • Smokeless tobacco: Never Used   Substance and Sexual Activity   • Alcohol use: Yes     Comment: 3 oz    • Drug use: No     Body mass index is 24.83 kg/m².      Medications:  Current Outpatient Medications   Medication Sig Dispense Refill   • finasteride (PROSCAR) 5 MG tablet Take 1 tablet by mouth Daily. 90 tablet 3   • pantoprazole (PROTONIX) 40 MG EC tablet Take 1 tablet by mouth Daily. 90 tablet 5   • acetaZOLAMIDE (DIAMOX) 250 MG tablet Take 1 tablet by mouth 2 (Two) Times a Day for 48 hours before and after rapid ascent. 16 tablet 1   • aspirin 81 MG EC tablet Take 81 mg by mouth Daily.     •  "ciprofloxacin (CIPRO) 500 MG tablet Take 1 tablet by mouth 2 (Two) Times a Day. 20 tablet 0   • doxycycline (MONODOX) 100 MG capsule Take 1 capsule by mouth Daily. 10 capsule 0   • guaifenesin-codeine (CHERATUSSIN AC) 100-10 MG/5ML liquid Take 5 mL by mouth Every 6 (Six) Hours As Needed for cough. 180 mL 0   • hepatitis A (HAVRIX) 1440 EL U/ML vaccine Inject 1 mL into the appropriate muscle as directed by prescriber. 1 mL 1   • metroNIDAZOLE (FLAGYL) 500 MG tablet Take 1 tablet by mouth 2 (Two) Times a Day for 10 days. 20 tablet 0   • metroNIDAZOLE (FLAGYL) 500 MG tablet Take 1 tablet by mouth 2 (Two) Times a Day. 20 tablet 0   • pantoprazole (PROTONIX) 40 MG EC tablet Take 1 tablet by mouth Daily. 90 tablet 5   • pantoprazole (PROTONIX) 40 MG EC tablet Take 1 tablet by mouth Daily. 90 tablet 4   • zolpidem (AMBIEN) 10 MG tablet Take 1 tablet by mouth every night at bedtime as needed 8 tablet 0   • zolpidem (AMBIEN) 10 MG tablet Take 1 tablet by mouth At Night As Needed. 6 tablet 0   • zolpidem (AMBIEN) 10 MG tablet Take 1 tablet by mouth Every Night. 6 tablet 0     No current facility-administered medications for this visit.            Allergies:  No Known Allergies        Review of Systems:   Review of Systems   Constitutional: Negative.    HENT: Negative.    Eyes: Negative.    Respiratory: Negative.    Cardiovascular: Negative.    Gastrointestinal: Negative.    Endocrine: Negative.    Genitourinary: Negative.    Musculoskeletal: Positive for arthralgias and back pain.   Skin: Negative.    Allergic/Immunologic: Negative.    Hematological: Negative.    Psychiatric/Behavioral: Negative.            Physical Exam:   Physical Exam  GENERAL: 79 y.o. male, alert and oriented X 3 in no acute distress.   Visit Vitals  Ht 177.8 cm (70\")   Wt 78.5 kg (173 lb 1 oz)   BMI 24.83 kg/m²         Musculoskeletal:   Examination left ankle reveals palpable Achilles tendon with continuity from the insertion to the calcaneus " proximally.  About 5 cm proximal there is moderate tenderness with no dental viable defect.  He has active plantar flexion dorsiflexion normal neurovascular status.        Radiology/Labs:     Mri Ankle Left Without Contrast    Result Date: 4/15/2020   1. Partial-thickness (slightly greater than 50%) tear of the Achilles tendon as described above approximately 3 cm superior to calcaneal insertion. No complete tear or retraction identified. The tear is 2.1 cm in length. 2. Arthritic changes and stress-related changes of the midfoot articulations. 3. Mild tenosynovitis of the medial flexor tendons. No significant tendinopathy or tendon tear of the remaining tendons of the foot/ankle. 4. Sequela of old low-grade sprain ATF. Soft tissue edema. 5. Other nonacute findings as above.  This report was finalized on 4/15/2020 9:01 AM by Dr. Дмитрий Guzman MD.            Assessment & Plan:   79 y.o. male presents with pain left Achilles tendon past 3 weeks with MRI identifying partial intrasubstance tear.  He is placed in walking boot for the next 3 weeks he is also provided lace up ankle brace to wear at night.  He is encouraged to limit his physical exercise especially running to avoid completion of his partial Achilles tendon tear.  We will reevaluate in approximately 4 weeks.        ICD-10-CM ICD-9-CM   1. Partial tear of left Achilles tendon, initial encounter S86.012A 845.09           Cc:   Jimmy Zhang MD                This document has been electronically signed by Romel Stoner MD   April 16, 2020 09:05

## 2020-04-16 VITALS — WEIGHT: 173.06 LBS | BODY MASS INDEX: 24.78 KG/M2 | TEMPERATURE: 97 F | HEIGHT: 70 IN

## 2020-05-13 DIAGNOSIS — N40.0 BENIGN PROSTATIC HYPERPLASIA WITHOUT LOWER URINARY TRACT SYMPTOMS: ICD-10-CM

## 2020-05-13 RX ORDER — FINASTERIDE 5 MG/1
5 TABLET, FILM COATED ORAL DAILY
Qty: 90 TABLET | Refills: 3 | Status: CANCELLED | OUTPATIENT
Start: 2020-05-13

## 2020-05-14 DIAGNOSIS — N40.0 BENIGN PROSTATIC HYPERPLASIA WITHOUT LOWER URINARY TRACT SYMPTOMS: ICD-10-CM

## 2020-05-14 RX ORDER — FINASTERIDE 5 MG/1
5 TABLET, FILM COATED ORAL DAILY
Qty: 90 TABLET | Refills: 3 | Status: SHIPPED | OUTPATIENT
Start: 2020-05-14 | End: 2021-06-09 | Stop reason: SDUPTHER

## 2020-07-10 ENCOUNTER — LAB (OUTPATIENT)
Dept: LAB | Facility: HOSPITAL | Age: 80
End: 2020-07-10

## 2020-07-10 ENCOUNTER — TRANSCRIBE ORDERS (OUTPATIENT)
Dept: ADMINISTRATIVE | Facility: HOSPITAL | Age: 80
End: 2020-07-10

## 2020-07-10 DIAGNOSIS — E78.2 MIXED HYPERLIPIDEMIA: ICD-10-CM

## 2020-07-10 DIAGNOSIS — D50.9 IRON DEFICIENCY ANEMIA, UNSPECIFIED IRON DEFICIENCY ANEMIA TYPE: Primary | ICD-10-CM

## 2020-07-10 DIAGNOSIS — E55.9 VITAMIN D DEFICIENCY: ICD-10-CM

## 2020-07-10 DIAGNOSIS — Z12.5 SPECIAL SCREENING, PROSTATE CANCER: ICD-10-CM

## 2020-07-10 DIAGNOSIS — M76.62 TENDONITIS, ACHILLES, LEFT: ICD-10-CM

## 2020-07-10 DIAGNOSIS — E55.9 AVITAMINOSIS D: ICD-10-CM

## 2020-07-10 DIAGNOSIS — M51.16 NEURITIS OR RADICULITIS DUE TO RUPTURE OF LUMBAR INTERVERTEBRAL DISC: ICD-10-CM

## 2020-07-10 DIAGNOSIS — Z00.00 ROUTINE GENERAL MEDICAL EXAMINATION AT A HEALTH CARE FACILITY: ICD-10-CM

## 2020-07-10 DIAGNOSIS — Z12.11 SCREEN FOR COLON CANCER: ICD-10-CM

## 2020-07-10 DIAGNOSIS — E11.9 DIABETES MELLITUS WITHOUT COMPLICATION (HCC): ICD-10-CM

## 2020-07-10 DIAGNOSIS — D50.9 IRON DEFICIENCY ANEMIA, UNSPECIFIED IRON DEFICIENCY ANEMIA TYPE: ICD-10-CM

## 2020-07-10 DIAGNOSIS — D64.9 ANEMIA, UNSPECIFIED TYPE: ICD-10-CM

## 2020-07-10 LAB
ALBUMIN SERPL-MCNC: 4.43 G/DL (ref 3.5–5.2)
ALBUMIN UR-MCNC: <1.2 MG/DL
ALBUMIN/GLOB SERPL: 1.5 G/DL
ALP SERPL-CCNC: 58 U/L (ref 39–117)
ALT SERPL W P-5'-P-CCNC: 14 U/L (ref 1–41)
ANION GAP SERPL CALCULATED.3IONS-SCNC: 11.8 MMOL/L (ref 5–15)
AST SERPL-CCNC: 12 U/L (ref 1–40)
BASOPHILS # BLD AUTO: 0.07 10*3/MM3 (ref 0–0.2)
BASOPHILS NFR BLD AUTO: 1.2 % (ref 0–1.5)
BILIRUB SERPL-MCNC: 0.8 MG/DL (ref 0–1.2)
BILIRUB UR QL STRIP: NEGATIVE
BUN SERPL-MCNC: 18 MG/DL (ref 8–23)
BUN/CREAT SERPL: 26.1 (ref 7–25)
CALCIUM SPEC-SCNC: 9.8 MG/DL (ref 8.6–10.5)
CHLORIDE SERPL-SCNC: 103 MMOL/L (ref 98–107)
CHOLEST SERPL-MCNC: 182 MG/DL (ref 0–200)
CLARITY UR: CLEAR
CO2 SERPL-SCNC: 28.2 MMOL/L (ref 22–29)
COLOR UR: YELLOW
CREAT SERPL-MCNC: 0.69 MG/DL (ref 0.76–1.27)
DEPRECATED RDW RBC AUTO: 44.3 FL (ref 37–54)
EOSINOPHIL # BLD AUTO: 0.19 10*3/MM3 (ref 0–0.4)
EOSINOPHIL NFR BLD AUTO: 3.3 % (ref 0.3–6.2)
ERYTHROCYTE [DISTWIDTH] IN BLOOD BY AUTOMATED COUNT: 12.4 % (ref 12.3–15.4)
FOLATE SERPL-MCNC: >20 NG/ML (ref 4.78–24.2)
GFR SERPL CREATININE-BSD FRML MDRD: 111 ML/MIN/1.73
GLOBULIN UR ELPH-MCNC: 3 GM/DL
GLUCOSE SERPL-MCNC: 113 MG/DL (ref 65–99)
GLUCOSE UR STRIP-MCNC: NEGATIVE MG/DL
HBA1C MFR BLD: 6.2 % (ref 4.8–5.6)
HCT VFR BLD AUTO: 40.2 % (ref 37.5–51)
HDLC SERPL-MCNC: 71 MG/DL (ref 40–60)
HGB BLD-MCNC: 12.9 G/DL (ref 13–17.7)
HGB UR QL STRIP.AUTO: NEGATIVE
IMM GRANULOCYTES # BLD AUTO: 0.01 10*3/MM3 (ref 0–0.05)
IMM GRANULOCYTES NFR BLD AUTO: 0.2 % (ref 0–0.5)
KETONES UR QL STRIP: NEGATIVE
LDLC SERPL CALC-MCNC: 100 MG/DL (ref 0–100)
LDLC/HDLC SERPL: 1.41 {RATIO}
LEUKOCYTE ESTERASE UR QL STRIP.AUTO: NEGATIVE
LYMPHOCYTES # BLD AUTO: 2.55 10*3/MM3 (ref 0.7–3.1)
LYMPHOCYTES NFR BLD AUTO: 43.7 % (ref 19.6–45.3)
MCH RBC QN AUTO: 30.9 PG (ref 26.6–33)
MCHC RBC AUTO-ENTMCNC: 32.1 G/DL (ref 31.5–35.7)
MCV RBC AUTO: 96.4 FL (ref 79–97)
MONOCYTES # BLD AUTO: 0.61 10*3/MM3 (ref 0.1–0.9)
MONOCYTES NFR BLD AUTO: 10.4 % (ref 5–12)
NEUTROPHILS NFR BLD AUTO: 2.41 10*3/MM3 (ref 1.7–7)
NEUTROPHILS NFR BLD AUTO: 41.2 % (ref 42.7–76)
NITRITE UR QL STRIP: NEGATIVE
NRBC BLD AUTO-RTO: 0 /100 WBC (ref 0–0.2)
PH UR STRIP.AUTO: <=5 [PH] (ref 5–8)
PLATELET # BLD AUTO: 314 10*3/MM3 (ref 140–450)
PMV BLD AUTO: 9.9 FL (ref 6–12)
POTASSIUM SERPL-SCNC: 4.3 MMOL/L (ref 3.5–5.2)
PROT SERPL-MCNC: 7.4 G/DL (ref 6–8.5)
PROT UR QL STRIP: NEGATIVE
PSA SERPL-MCNC: 1.08 NG/ML (ref 0–4)
RBC # BLD AUTO: 4.17 10*6/MM3 (ref 4.14–5.8)
SODIUM SERPL-SCNC: 143 MMOL/L (ref 136–145)
SP GR UR STRIP: 1.02 (ref 1–1.03)
TRIGL SERPL-MCNC: 53 MG/DL (ref 0–150)
TSH SERPL DL<=0.05 MIU/L-ACNC: 4.03 UIU/ML (ref 0.27–4.2)
UROBILINOGEN UR QL STRIP: NORMAL
VIT B12 BLD-MCNC: 553 PG/ML (ref 211–946)
VLDLC SERPL-MCNC: 10.6 MG/DL
WBC # BLD AUTO: 5.84 10*3/MM3 (ref 3.4–10.8)

## 2020-07-10 PROCEDURE — 82746 ASSAY OF FOLIC ACID SERUM: CPT

## 2020-07-10 PROCEDURE — 81003 URINALYSIS AUTO W/O SCOPE: CPT

## 2020-07-10 PROCEDURE — 84443 ASSAY THYROID STIM HORMONE: CPT

## 2020-07-10 PROCEDURE — 36415 COLL VENOUS BLD VENIPUNCTURE: CPT

## 2020-07-10 PROCEDURE — 82607 VITAMIN B-12: CPT

## 2020-07-10 PROCEDURE — 83036 HEMOGLOBIN GLYCOSYLATED A1C: CPT

## 2020-07-10 PROCEDURE — G0103 PSA SCREENING: HCPCS

## 2020-07-10 PROCEDURE — 80061 LIPID PANEL: CPT

## 2020-07-10 PROCEDURE — 80053 COMPREHEN METABOLIC PANEL: CPT

## 2020-07-10 PROCEDURE — 85025 COMPLETE CBC W/AUTO DIFF WBC: CPT

## 2020-07-10 PROCEDURE — 82043 UR ALBUMIN QUANTITATIVE: CPT

## 2020-10-14 ENCOUNTER — TRANSCRIBE ORDERS (OUTPATIENT)
Dept: ADMINISTRATIVE | Facility: HOSPITAL | Age: 80
End: 2020-10-14

## 2020-10-14 ENCOUNTER — HOSPITAL ENCOUNTER (OUTPATIENT)
Dept: GENERAL RADIOLOGY | Facility: HOSPITAL | Age: 80
Discharge: HOME | End: 2020-10-14
Admitting: INTERNAL MEDICINE

## 2020-10-14 DIAGNOSIS — M50.00 CERVICAL DISC DISEASE WITH MYELOPATHY: Primary | ICD-10-CM

## 2020-10-14 DIAGNOSIS — M50.00 CERVICAL DISC DISEASE WITH MYELOPATHY: ICD-10-CM

## 2020-10-14 PROCEDURE — 72050 X-RAY EXAM NECK SPINE 4/5VWS: CPT | Performed by: RADIOLOGY

## 2020-10-14 PROCEDURE — 72050 X-RAY EXAM NECK SPINE 4/5VWS: CPT

## 2020-10-15 ENCOUNTER — TRANSCRIBE ORDERS (OUTPATIENT)
Dept: ADMINISTRATIVE | Facility: HOSPITAL | Age: 80
End: 2020-10-15

## 2020-10-15 DIAGNOSIS — M48.02 SPINAL STENOSIS IN CERVICAL REGION: Primary | ICD-10-CM

## 2020-10-16 ENCOUNTER — HOSPITAL ENCOUNTER (OUTPATIENT)
Dept: MRI IMAGING | Facility: HOSPITAL | Age: 80
Discharge: HOME | End: 2020-10-16
Admitting: INTERNAL MEDICINE

## 2020-10-16 DIAGNOSIS — M48.02 SPINAL STENOSIS IN CERVICAL REGION: ICD-10-CM

## 2020-10-16 PROCEDURE — 72141 MRI NECK SPINE W/O DYE: CPT | Performed by: RADIOLOGY

## 2020-10-16 PROCEDURE — 72141 MRI NECK SPINE W/O DYE: CPT

## 2020-12-15 ENCOUNTER — IMMUNIZATION (OUTPATIENT)
Dept: VACCINE CLINIC | Facility: HOSPITAL | Age: 80
End: 2020-12-15

## 2020-12-15 PROCEDURE — 0001A: CPT

## 2020-12-15 PROCEDURE — 91300 HC SARSCOV02 VAC 30MCG/0.3ML IM: CPT

## 2021-01-05 ENCOUNTER — IMMUNIZATION (OUTPATIENT)
Dept: VACCINE CLINIC | Facility: HOSPITAL | Age: 81
End: 2021-01-05

## 2021-01-05 PROCEDURE — 0002A: CPT | Performed by: FAMILY MEDICINE

## 2021-01-05 PROCEDURE — 91300 HC SARSCOV02 VAC 30MCG/0.3ML IM: CPT | Performed by: FAMILY MEDICINE

## 2021-01-05 PROCEDURE — 0001A: CPT | Performed by: FAMILY MEDICINE

## 2021-05-09 ENCOUNTER — TRANSCRIBE ORDERS (OUTPATIENT)
Dept: LAB | Facility: HOSPITAL | Age: 81
End: 2021-05-09

## 2021-05-09 ENCOUNTER — LAB (OUTPATIENT)
Dept: LAB | Facility: HOSPITAL | Age: 81
End: 2021-05-09

## 2021-05-09 DIAGNOSIS — D64.9 ANEMIA, UNSPECIFIED TYPE: ICD-10-CM

## 2021-05-09 DIAGNOSIS — E78.2 MIXED HYPERLIPIDEMIA: ICD-10-CM

## 2021-05-09 DIAGNOSIS — E11.9 DIABETES MELLITUS WITHOUT COMPLICATION (HCC): ICD-10-CM

## 2021-05-09 DIAGNOSIS — D50.9 IRON DEFICIENCY ANEMIA, UNSPECIFIED IRON DEFICIENCY ANEMIA TYPE: ICD-10-CM

## 2021-05-09 DIAGNOSIS — E55.9 AVITAMINOSIS D: ICD-10-CM

## 2021-05-09 DIAGNOSIS — D50.9 IRON DEFICIENCY ANEMIA, UNSPECIFIED IRON DEFICIENCY ANEMIA TYPE: Primary | ICD-10-CM

## 2021-05-09 LAB
25(OH)D3 SERPL-MCNC: 31.9 NG/ML (ref 30–100)
ALBUMIN SERPL-MCNC: 4.2 G/DL (ref 3.5–5.2)
ALBUMIN UR-MCNC: <1.2 MG/DL
ALBUMIN/GLOB SERPL: 1.6 G/DL
ALP SERPL-CCNC: 55 U/L (ref 39–117)
ALT SERPL W P-5'-P-CCNC: 15 U/L (ref 1–41)
ANION GAP SERPL CALCULATED.3IONS-SCNC: 9.6 MMOL/L (ref 5–15)
AST SERPL-CCNC: 21 U/L (ref 1–40)
BASOPHILS # BLD AUTO: 0.06 10*3/MM3 (ref 0–0.2)
BASOPHILS NFR BLD AUTO: 1 % (ref 0–1.5)
BILIRUB SERPL-MCNC: 0.8 MG/DL (ref 0–1.2)
BUN SERPL-MCNC: 19 MG/DL (ref 8–23)
BUN/CREAT SERPL: 30.2 (ref 7–25)
CALCIUM SPEC-SCNC: 9.4 MG/DL (ref 8.6–10.5)
CHLORIDE SERPL-SCNC: 106 MMOL/L (ref 98–107)
CHOLEST SERPL-MCNC: 176 MG/DL (ref 0–200)
CO2 SERPL-SCNC: 25.4 MMOL/L (ref 22–29)
CREAT SERPL-MCNC: 0.63 MG/DL (ref 0.76–1.27)
D DIMER PPP FEU-MCNC: 0.72 MCGFEU/ML (ref 0–0.5)
DEPRECATED RDW RBC AUTO: 42.3 FL (ref 37–54)
EOSINOPHIL # BLD AUTO: 0.25 10*3/MM3 (ref 0–0.4)
EOSINOPHIL NFR BLD AUTO: 4.1 % (ref 0.3–6.2)
ERYTHROCYTE [DISTWIDTH] IN BLOOD BY AUTOMATED COUNT: 12.3 % (ref 12.3–15.4)
FERRITIN SERPL-MCNC: 512 NG/ML (ref 30–400)
GFR SERPL CREATININE-BSD FRML MDRD: 123 ML/MIN/1.73
GLOBULIN UR ELPH-MCNC: 2.7 GM/DL
GLUCOSE SERPL-MCNC: 107 MG/DL (ref 65–99)
HBA1C MFR BLD: 6.04 % (ref 4.8–5.6)
HCT VFR BLD AUTO: 38.7 % (ref 37.5–51)
HDLC SERPL-MCNC: 69 MG/DL (ref 40–60)
HGB BLD-MCNC: 13 G/DL (ref 13–17.7)
IMM GRANULOCYTES # BLD AUTO: 0.01 10*3/MM3 (ref 0–0.05)
IMM GRANULOCYTES NFR BLD AUTO: 0.2 % (ref 0–0.5)
IRON 24H UR-MRATE: 104 MCG/DL (ref 59–158)
IRON SATN MFR SERPL: 28 % (ref 20–50)
LDLC SERPL CALC-MCNC: 98 MG/DL (ref 0–100)
LDLC/HDLC SERPL: 1.43 {RATIO}
LYMPHOCYTES # BLD AUTO: 2.48 10*3/MM3 (ref 0.7–3.1)
LYMPHOCYTES NFR BLD AUTO: 40.8 % (ref 19.6–45.3)
MCH RBC QN AUTO: 31.7 PG (ref 26.6–33)
MCHC RBC AUTO-ENTMCNC: 33.6 G/DL (ref 31.5–35.7)
MCV RBC AUTO: 94.4 FL (ref 79–97)
MONOCYTES # BLD AUTO: 0.63 10*3/MM3 (ref 0.1–0.9)
MONOCYTES NFR BLD AUTO: 10.4 % (ref 5–12)
NEUTROPHILS NFR BLD AUTO: 2.65 10*3/MM3 (ref 1.7–7)
NEUTROPHILS NFR BLD AUTO: 43.5 % (ref 42.7–76)
NRBC BLD AUTO-RTO: 0 /100 WBC (ref 0–0.2)
NT-PROBNP SERPL-MCNC: 105.5 PG/ML (ref 0–1800)
PLATELET # BLD AUTO: 319 10*3/MM3 (ref 140–450)
PMV BLD AUTO: 9.8 FL (ref 6–12)
POTASSIUM SERPL-SCNC: 4.6 MMOL/L (ref 3.5–5.2)
PROT SERPL-MCNC: 6.9 G/DL (ref 6–8.5)
RBC # BLD AUTO: 4.1 10*6/MM3 (ref 4.14–5.8)
SODIUM SERPL-SCNC: 141 MMOL/L (ref 136–145)
T-UPTAKE NFR SERPL: 1.02 TBI (ref 0.8–1.3)
T4 SERPL-MCNC: 5.55 MCG/DL (ref 4.5–11.7)
TIBC SERPL-MCNC: 374 MCG/DL (ref 298–536)
TRANSFERRIN SERPL-MCNC: 251 MG/DL (ref 200–360)
TRIGL SERPL-MCNC: 41 MG/DL (ref 0–150)
TSH SERPL DL<=0.05 MIU/L-ACNC: 3.97 UIU/ML (ref 0.27–4.2)
URATE SERPL-MCNC: 3.3 MG/DL (ref 3.4–7)
VIT B12 BLD-MCNC: 478 PG/ML (ref 211–946)
VLDLC SERPL-MCNC: 9 MG/DL (ref 5–40)
WBC # BLD AUTO: 6.08 10*3/MM3 (ref 3.4–10.8)

## 2021-05-09 PROCEDURE — 85379 FIBRIN DEGRADATION QUANT: CPT

## 2021-05-09 PROCEDURE — 84436 ASSAY OF TOTAL THYROXINE: CPT | Performed by: INTERNAL MEDICINE

## 2021-05-09 PROCEDURE — 84550 ASSAY OF BLOOD/URIC ACID: CPT

## 2021-05-09 PROCEDURE — 84443 ASSAY THYROID STIM HORMONE: CPT | Performed by: INTERNAL MEDICINE

## 2021-05-09 PROCEDURE — 83880 ASSAY OF NATRIURETIC PEPTIDE: CPT

## 2021-05-09 PROCEDURE — 82043 UR ALBUMIN QUANTITATIVE: CPT

## 2021-05-09 PROCEDURE — 85025 COMPLETE CBC W/AUTO DIFF WBC: CPT

## 2021-05-09 PROCEDURE — 82306 VITAMIN D 25 HYDROXY: CPT

## 2021-05-09 PROCEDURE — 80061 LIPID PANEL: CPT

## 2021-05-09 PROCEDURE — 84466 ASSAY OF TRANSFERRIN: CPT

## 2021-05-09 PROCEDURE — 84479 ASSAY OF THYROID (T3 OR T4): CPT | Performed by: INTERNAL MEDICINE

## 2021-05-09 PROCEDURE — 80053 COMPREHEN METABOLIC PANEL: CPT

## 2021-05-09 PROCEDURE — 84153 ASSAY OF PSA TOTAL: CPT

## 2021-05-09 PROCEDURE — 82728 ASSAY OF FERRITIN: CPT

## 2021-05-09 PROCEDURE — 83036 HEMOGLOBIN GLYCOSYLATED A1C: CPT

## 2021-05-09 PROCEDURE — 82607 VITAMIN B-12: CPT

## 2021-05-09 PROCEDURE — 36415 COLL VENOUS BLD VENIPUNCTURE: CPT

## 2021-05-09 PROCEDURE — 83540 ASSAY OF IRON: CPT

## 2021-05-10 ENCOUNTER — TRANSCRIBE ORDERS (OUTPATIENT)
Dept: ADMINISTRATIVE | Facility: HOSPITAL | Age: 81
End: 2021-05-10

## 2021-05-10 DIAGNOSIS — M48.061 DEGENERATIVE LUMBAR SPINAL STENOSIS: Primary | ICD-10-CM

## 2021-05-11 LAB
PSA SERPL-MCNC: 1.1 NG/ML (ref 0–4)
REFLEX: NORMAL

## 2021-05-13 ENCOUNTER — TRANSCRIBE ORDERS (OUTPATIENT)
Dept: ADMINISTRATIVE | Facility: HOSPITAL | Age: 81
End: 2021-05-13

## 2021-05-13 DIAGNOSIS — M79.605 BILATERAL LEG PAIN: Primary | ICD-10-CM

## 2021-05-13 DIAGNOSIS — M79.604 BILATERAL LEG PAIN: Primary | ICD-10-CM

## 2021-05-25 ENCOUNTER — HOSPITAL ENCOUNTER (OUTPATIENT)
Dept: CARDIOLOGY | Facility: HOSPITAL | Age: 81
Discharge: HOME | End: 2021-05-25

## 2021-05-25 ENCOUNTER — HOSPITAL ENCOUNTER (OUTPATIENT)
Dept: MRI IMAGING | Facility: HOSPITAL | Age: 81
Discharge: HOME | End: 2021-05-25

## 2021-05-25 DIAGNOSIS — M48.061 DEGENERATIVE LUMBAR SPINAL STENOSIS: ICD-10-CM

## 2021-05-25 DIAGNOSIS — M79.605 BILATERAL LEG PAIN: ICD-10-CM

## 2021-05-25 DIAGNOSIS — M79.604 BILATERAL LEG PAIN: ICD-10-CM

## 2021-05-25 PROCEDURE — 0 GADOBENATE DIMEGLUMINE 529 MG/ML SOLUTION

## 2021-05-25 PROCEDURE — 0 GADOBENATE DIMEGLUMINE 529 MG/ML SOLUTION: Performed by: INTERNAL MEDICINE

## 2021-05-25 PROCEDURE — 72158 MRI LUMBAR SPINE W/O & W/DYE: CPT

## 2021-05-25 PROCEDURE — 93970 EXTREMITY STUDY: CPT

## 2021-05-25 PROCEDURE — A9577 INJ MULTIHANCE: HCPCS

## 2021-05-25 PROCEDURE — 72158 MRI LUMBAR SPINE W/O & W/DYE: CPT | Performed by: RADIOLOGY

## 2021-05-25 PROCEDURE — A9577 INJ MULTIHANCE: HCPCS | Performed by: INTERNAL MEDICINE

## 2021-05-25 PROCEDURE — 93970 EXTREMITY STUDY: CPT | Performed by: RADIOLOGY

## 2021-05-25 RX ADMIN — GADOBENATE DIMEGLUMINE 15 ML: 529 INJECTION, SOLUTION INTRAVENOUS at 08:57

## 2021-08-17 ENCOUNTER — TRANSCRIBE ORDERS (OUTPATIENT)
Dept: ADMINISTRATIVE | Facility: HOSPITAL | Age: 81
End: 2021-08-17

## 2021-08-17 ENCOUNTER — HOSPITAL ENCOUNTER (OUTPATIENT)
Dept: GENERAL RADIOLOGY | Facility: HOSPITAL | Age: 81
Discharge: HOME | End: 2021-08-17
Admitting: ANESTHESIOLOGY

## 2021-08-17 DIAGNOSIS — M54.50 LOW BACK PAIN, UNSPECIFIED BACK PAIN LATERALITY, UNSPECIFIED CHRONICITY, UNSPECIFIED WHETHER SCIATICA PRESENT: ICD-10-CM

## 2021-08-17 DIAGNOSIS — M54.50 LOW BACK PAIN, UNSPECIFIED BACK PAIN LATERALITY, UNSPECIFIED CHRONICITY, UNSPECIFIED WHETHER SCIATICA PRESENT: Primary | ICD-10-CM

## 2021-08-17 PROCEDURE — 73502 X-RAY EXAM HIP UNI 2-3 VIEWS: CPT | Performed by: RADIOLOGY

## 2021-08-17 PROCEDURE — 73502 X-RAY EXAM HIP UNI 2-3 VIEWS: CPT

## 2021-08-31 ENCOUNTER — TRANSCRIBE ORDERS (OUTPATIENT)
Dept: ADMINISTRATIVE | Facility: HOSPITAL | Age: 81
End: 2021-08-31

## 2021-08-31 ENCOUNTER — OFFICE VISIT (OUTPATIENT)
Dept: CARDIOLOGY | Facility: CLINIC | Age: 81
End: 2021-08-31

## 2021-08-31 ENCOUNTER — HOSPITAL ENCOUNTER (OUTPATIENT)
Dept: RESPIRATORY THERAPY | Facility: HOSPITAL | Age: 81
Discharge: HOME | End: 2021-08-31
Admitting: INTERNAL MEDICINE

## 2021-08-31 VITALS
WEIGHT: 181 LBS | SYSTOLIC BLOOD PRESSURE: 143 MMHG | HEART RATE: 70 BPM | HEIGHT: 70 IN | DIASTOLIC BLOOD PRESSURE: 77 MMHG | BODY MASS INDEX: 25.91 KG/M2

## 2021-08-31 DIAGNOSIS — R00.2 PALPITATIONS: Primary | ICD-10-CM

## 2021-08-31 DIAGNOSIS — I48.0 PAROXYSMAL ATRIAL FIBRILLATION (HCC): Primary | ICD-10-CM

## 2021-08-31 DIAGNOSIS — R00.2 PALPITATIONS: ICD-10-CM

## 2021-08-31 DIAGNOSIS — R06.09 DYSPNEA ON EXERTION: ICD-10-CM

## 2021-08-31 PROCEDURE — 93005 ELECTROCARDIOGRAM TRACING: CPT | Performed by: INTERNAL MEDICINE

## 2021-08-31 PROCEDURE — 99204 OFFICE O/P NEW MOD 45 MIN: CPT | Performed by: INTERNAL MEDICINE

## 2021-08-31 RX ORDER — DILTIAZEM HYDROCHLORIDE 120 MG/1
120 CAPSULE, COATED, EXTENDED RELEASE ORAL DAILY
Qty: 30 CAPSULE | Refills: 2 | Status: SHIPPED | OUTPATIENT
Start: 2021-08-31 | End: 2021-10-14

## 2021-08-31 RX ORDER — CHLORAL HYDRATE 500 MG
CAPSULE ORAL
COMMUNITY

## 2021-08-31 RX ORDER — MULTIVIT WITH MINERALS/LUTEIN
250 TABLET ORAL DAILY
COMMUNITY

## 2021-09-06 LAB
QT INTERVAL: 404 MS
QTC INTERVAL: 488 MS

## 2021-09-10 ENCOUNTER — LAB (OUTPATIENT)
Dept: LAB | Facility: HOSPITAL | Age: 81
End: 2021-09-10

## 2021-09-10 ENCOUNTER — LAB (OUTPATIENT)
Dept: LAB | Facility: HOSPITAL | Age: 81
End: 2021-09-10
Attending: INTERNAL MEDICINE

## 2021-09-10 ENCOUNTER — TRANSCRIBE ORDERS (OUTPATIENT)
Dept: PSYCHIATRY | Facility: CLINIC | Age: 81
End: 2021-09-10

## 2021-09-10 DIAGNOSIS — Z13.1 SCREENING FOR DIABETES MELLITUS: Primary | ICD-10-CM

## 2021-09-10 DIAGNOSIS — Z13.1 SCREENING FOR DIABETES MELLITUS: ICD-10-CM

## 2021-09-10 LAB — HBA1C MFR BLD: 6 % (ref 4.8–5.6)

## 2021-09-10 PROCEDURE — 80048 BASIC METABOLIC PNL TOTAL CA: CPT | Performed by: INTERNAL MEDICINE

## 2021-09-10 PROCEDURE — 83036 HEMOGLOBIN GLYCOSYLATED A1C: CPT

## 2021-09-10 PROCEDURE — 83735 ASSAY OF MAGNESIUM: CPT | Performed by: INTERNAL MEDICINE

## 2021-09-10 PROCEDURE — 84443 ASSAY THYROID STIM HORMONE: CPT | Performed by: INTERNAL MEDICINE

## 2021-09-14 DIAGNOSIS — I48.0 PAROXYSMAL ATRIAL FIBRILLATION (HCC): Primary | ICD-10-CM

## 2021-09-22 ENCOUNTER — HOSPITAL ENCOUNTER (OUTPATIENT)
Dept: NUCLEAR MEDICINE | Facility: HOSPITAL | Age: 81
Discharge: HOME | End: 2021-09-22

## 2021-09-22 ENCOUNTER — HOSPITAL ENCOUNTER (OUTPATIENT)
Dept: CARDIOLOGY | Facility: HOSPITAL | Age: 81
Discharge: HOME | End: 2021-09-22

## 2021-09-22 DIAGNOSIS — I48.0 PAROXYSMAL ATRIAL FIBRILLATION (HCC): ICD-10-CM

## 2021-09-22 PROCEDURE — 93017 CV STRESS TEST TRACING ONLY: CPT

## 2021-09-22 PROCEDURE — 78452 HT MUSCLE IMAGE SPECT MULT: CPT | Performed by: INTERNAL MEDICINE

## 2021-09-22 PROCEDURE — 0 TECHNETIUM SESTAMIBI: Performed by: INTERNAL MEDICINE

## 2021-09-22 PROCEDURE — 93018 CV STRESS TEST I&R ONLY: CPT | Performed by: INTERNAL MEDICINE

## 2021-09-22 PROCEDURE — 93306 TTE W/DOPPLER COMPLETE: CPT | Performed by: INTERNAL MEDICINE

## 2021-09-22 PROCEDURE — 78452 HT MUSCLE IMAGE SPECT MULT: CPT

## 2021-09-22 PROCEDURE — A9500 TC99M SESTAMIBI: HCPCS | Performed by: INTERNAL MEDICINE

## 2021-09-22 PROCEDURE — 93306 TTE W/DOPPLER COMPLETE: CPT

## 2021-09-22 RX ADMIN — TECHNETIUM TC 99M SESTAMIBI 1 DOSE: 1 INJECTION INTRAVENOUS at 08:00

## 2021-09-23 LAB
BH CV ECHO MEAS - % IVS THICK: 28.9 %
BH CV ECHO MEAS - % LVPW THICK: 84.5 %
BH CV ECHO MEAS - ACS: 2.6 CM
BH CV ECHO MEAS - AO ROOT AREA (BSA CORRECTED): 1.8
BH CV ECHO MEAS - AO ROOT AREA: 10.2 CM^2
BH CV ECHO MEAS - AO ROOT DIAM: 3.6 CM
BH CV ECHO MEAS - BSA(HAYCOCK): 2 M^2
BH CV ECHO MEAS - BSA: 2 M^2
BH CV ECHO MEAS - BZI_BMI: 26 KILOGRAMS/M^2
BH CV ECHO MEAS - BZI_METRIC_HEIGHT: 177.8 CM
BH CV ECHO MEAS - BZI_METRIC_WEIGHT: 82.1 KG
BH CV ECHO MEAS - EDV(CUBED): 186.7 ML
BH CV ECHO MEAS - EDV(MOD-SP4): 87.8 ML
BH CV ECHO MEAS - EDV(TEICH): 161 ML
BH CV ECHO MEAS - EF(CUBED): 69.4 %
BH CV ECHO MEAS - EF(MOD-SP4): 62.9 %
BH CV ECHO MEAS - EF(TEICH): 60.3 %
BH CV ECHO MEAS - ESV(CUBED): 57.1 ML
BH CV ECHO MEAS - ESV(MOD-SP4): 32.6 ML
BH CV ECHO MEAS - ESV(TEICH): 63.9 ML
BH CV ECHO MEAS - FS: 32.6 %
BH CV ECHO MEAS - IVS/LVPW: 1.1
BH CV ECHO MEAS - IVSD: 1.2 CM
BH CV ECHO MEAS - IVSS: 1.5 CM
BH CV ECHO MEAS - LA DIMENSION: 4 CM
BH CV ECHO MEAS - LA/AO: 1.1
BH CV ECHO MEAS - LV DIASTOLIC VOL/BSA (35-75): 43.9 ML/M^2
BH CV ECHO MEAS - LV MASS(C)D: 256.4 GRAMS
BH CV ECHO MEAS - LV MASS(C)DI: 128.2 GRAMS/M^2
BH CV ECHO MEAS - LV MASS(C)S: 270.2 GRAMS
BH CV ECHO MEAS - LV MASS(C)SI: 135 GRAMS/M^2
BH CV ECHO MEAS - LV SYSTOLIC VOL/BSA (12-30): 16.3 ML/M^2
BH CV ECHO MEAS - LVIDD: 5.7 CM
BH CV ECHO MEAS - LVIDS: 3.9 CM
BH CV ECHO MEAS - LVLD AP4: 8 CM
BH CV ECHO MEAS - LVLS AP4: 7.4 CM
BH CV ECHO MEAS - LVOT AREA (M): 4.2 CM^2
BH CV ECHO MEAS - LVOT AREA: 4.2 CM^2
BH CV ECHO MEAS - LVOT DIAM: 2.3 CM
BH CV ECHO MEAS - LVPWD: 1 CM
BH CV ECHO MEAS - LVPWS: 1.9 CM
BH CV ECHO MEAS - MV A MAX VEL: 104 CM/SEC
BH CV ECHO MEAS - MV E MAX VEL: 59.6 CM/SEC
BH CV ECHO MEAS - MV E/A: 0.57
BH CV ECHO MEAS - PA ACC TIME: 0.07 SEC
BH CV ECHO MEAS - PA PR(ACCEL): 45.7 MMHG
BH CV ECHO MEAS - RAP SYSTOLE: 10 MMHG
BH CV ECHO MEAS - RVDD: 1.6 CM
BH CV ECHO MEAS - RVSP: 35.2 MMHG
BH CV ECHO MEAS - SI(CUBED): 64.8 ML/M^2
BH CV ECHO MEAS - SI(MOD-SP4): 27.6 ML/M^2
BH CV ECHO MEAS - SI(TEICH): 48.5 ML/M^2
BH CV ECHO MEAS - SV(CUBED): 129.6 ML
BH CV ECHO MEAS - SV(MOD-SP4): 55.2 ML
BH CV ECHO MEAS - SV(TEICH): 97.1 ML
BH CV ECHO MEAS - TR MAX VEL: 251 CM/SEC
MAXIMAL PREDICTED HEART RATE: 140 BPM
STRESS TARGET HR: 119 BPM

## 2021-09-23 PROCEDURE — 0 TECHNETIUM SESTAMIBI: Performed by: INTERNAL MEDICINE

## 2021-09-23 PROCEDURE — A9500 TC99M SESTAMIBI: HCPCS | Performed by: INTERNAL MEDICINE

## 2021-09-23 PROCEDURE — 25010000002 REGADENOSON 0.4 MG/5ML SOLUTION: Performed by: INTERNAL MEDICINE

## 2021-09-23 RX ADMIN — TECHNETIUM TC 99M SESTAMIBI 1 DOSE: 1 INJECTION INTRAVENOUS at 08:42

## 2021-09-23 RX ADMIN — REGADENOSON 0.4 MG: 0.08 INJECTION, SOLUTION INTRAVENOUS at 08:42

## 2021-09-26 LAB
BH CV REST NUCLEAR ISOTOPE DOSE: 21.2 MCI
BH CV STRESS BP STAGE 1: NORMAL
BH CV STRESS BP STAGE 2: NORMAL
BH CV STRESS COMMENTS STAGE 1: NORMAL
BH CV STRESS COMMENTS STAGE 2: NORMAL
BH CV STRESS DOSE REGADENOSON STAGE 1: 0.4
BH CV STRESS DURATION MIN STAGE 1: 0
BH CV STRESS DURATION MIN STAGE 2: 4
BH CV STRESS DURATION SEC STAGE 1: 10
BH CV STRESS DURATION SEC STAGE 2: 0
BH CV STRESS GRADE STAGE 1: 10
BH CV STRESS HR STAGE 1: 92
BH CV STRESS HR STAGE 2: 61
BH CV STRESS METS STAGE 1: 5
BH CV STRESS NUCLEAR ISOTOPE DOSE: 30.1 MCI
BH CV STRESS PROTOCOL 1: NORMAL
BH CV STRESS RECOVERY BP: NORMAL MMHG
BH CV STRESS RECOVERY HR: 61 BPM
BH CV STRESS SPEED STAGE 1: 1.7
BH CV STRESS STAGE 1: 1
BH CV STRESS STAGE 2: 2
LV EF NUC BP: 51 %
MAXIMAL PREDICTED HEART RATE: 140 BPM
PERCENT MAX PREDICTED HR: 65.71 %
STRESS BASELINE BP: NORMAL MMHG
STRESS BASELINE HR: 57 BPM
STRESS PERCENT HR: 77 %
STRESS POST PEAK BP: NORMAL MMHG
STRESS POST PEAK HR: 92 BPM
STRESS TARGET HR: 119 BPM

## 2021-09-30 ENCOUNTER — OFFICE VISIT (OUTPATIENT)
Dept: CARDIOLOGY | Facility: CLINIC | Age: 81
End: 2021-09-30

## 2021-09-30 ENCOUNTER — HOSPITAL ENCOUNTER (OUTPATIENT)
Dept: RESPIRATORY THERAPY | Facility: HOSPITAL | Age: 81
Discharge: HOME | End: 2021-09-30
Admitting: PHYSICIAN ASSISTANT

## 2021-09-30 VITALS
DIASTOLIC BLOOD PRESSURE: 57 MMHG | OXYGEN SATURATION: 91 % | TEMPERATURE: 97 F | HEIGHT: 70 IN | WEIGHT: 179.2 LBS | HEART RATE: 40 BPM | BODY MASS INDEX: 25.65 KG/M2 | SYSTOLIC BLOOD PRESSURE: 110 MMHG

## 2021-09-30 DIAGNOSIS — I48.0 PAROXYSMAL ATRIAL FIBRILLATION (HCC): Primary | ICD-10-CM

## 2021-09-30 DIAGNOSIS — I48.0 PAROXYSMAL ATRIAL FIBRILLATION (HCC): ICD-10-CM

## 2021-09-30 PROCEDURE — 99214 OFFICE O/P EST MOD 30 MIN: CPT | Performed by: INTERNAL MEDICINE

## 2021-09-30 PROCEDURE — 93246 EXT ECG>7D<15D RECORDING: CPT

## 2021-10-14 ENCOUNTER — OFFICE VISIT (OUTPATIENT)
Dept: NEUROSURGERY | Facility: CLINIC | Age: 81
End: 2021-10-14

## 2021-10-14 VITALS — WEIGHT: 181.8 LBS | HEIGHT: 70 IN | BODY MASS INDEX: 26.03 KG/M2

## 2021-10-14 DIAGNOSIS — G89.29 CHRONIC BILATERAL LOW BACK PAIN WITH RIGHT-SIDED SCIATICA: Primary | ICD-10-CM

## 2021-10-14 DIAGNOSIS — M79.604 LEG PAIN, LATERAL, RIGHT: ICD-10-CM

## 2021-10-14 DIAGNOSIS — M54.2 POSTERIOR NECK PAIN: ICD-10-CM

## 2021-10-14 DIAGNOSIS — M54.41 CHRONIC BILATERAL LOW BACK PAIN WITH RIGHT-SIDED SCIATICA: Primary | ICD-10-CM

## 2021-10-14 PROCEDURE — 99204 OFFICE O/P NEW MOD 45 MIN: CPT | Performed by: NEUROLOGICAL SURGERY

## 2021-10-14 RX ORDER — ASPIRIN 81 MG/1
81 TABLET ORAL DAILY
COMMUNITY
End: 2021-10-28 | Stop reason: SINTOL

## 2021-10-14 NOTE — PROGRESS NOTES
NAME: SERENE BHAKTA   DOS: 10/14/2021  : 1940  PCP: Jimmy Zhang MD    Chief Complaint:    Chief Complaint   Patient presents with   • Neck Pain   • Back Pain       History of Present Illness:  80 y.o. 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 has had 2 prior back surgeries with a suspected CSF leak in one of his surgeries based on his imaging    He did well with that is extremely active hikes moves around and is quite active he reports a history of neck and right-sided leg pain    The back and leg pain is associate with intermittent nature definitely seems sciatic in nature it radiates down the leg he usually can walk it out but it is inhibiting his activities of daily living he denies any postural or neurogenic claudication overtones in general    As far as his neck goes he does have some right paraspinal discomfort its up at the craniocervical junction probably around the C3-4 area and its associated with no evidence of radiation he occasionally has scapula pain he has been through physical therapy injections to no avail and is here for evaluation he is recently started on Eliquis for evaluation A. fib    PMHX  Allergies:  No Known Allergies  Medications    Current Outpatient Medications:   •  apixaban (ELIQUIS) 5 MG tablet tablet, Take 1 tablet by mouth Every 12 (Twelve) Hours., Disp: 60 tablet, Rfl: 3  •  aspirin 81 MG EC tablet, Take 81 mg by mouth Daily., Disp: , Rfl:   •  Omega-3 Fatty Acids (fish oil) 1000 MG capsule capsule, Take  by mouth Daily With Breakfast., Disp: , Rfl:   •  pantoprazole (PROTONIX) 40 MG EC tablet, Take 1 tablet by mouth Daily., Disp: 90 tablet, Rfl: 4  •  vitamin C (ASCORBIC ACID) 250 MG tablet, Take 250 mg by mouth Daily., Disp: , Rfl:   Past Medical History:  Past Medical History:   Diagnosis Date   • Pre-diabetes      Past Surgical History:  Past Surgical History:   Procedure Laterality Date   • BACK SURGERY       2012   • COLON RESECTION     • HUMERUS FRACTURE SURGERY     • ROTATOR CUFF REPAIR Right 2000   • SKIN CANCER EXCISION  2010    Melanoma resection     Social Hx:  Social History     Tobacco Use   • Smoking status: Former Smoker     Quit date: 1980     Years since quittin.8   • Smokeless tobacco: Never Used   Vaping Use   • Vaping Use: Never used   Substance Use Topics   • Alcohol use: Yes     Comment: 3 oz    • Drug use: No     Family Hx:  Family History   Problem Relation Age of Onset   • Diverticulitis Mother    • Rheum arthritis Mother    • Diabetes Mother    • Lung cancer Father    • Heart disease Father    • Heart disease Brother      Review of Systems:        Review of Systems   Constitutional: Negative for activity change, appetite change, chills, diaphoresis, fatigue, fever and unexpected weight change.   HENT: Negative for congestion, dental problem, drooling, ear discharge, ear pain, facial swelling, hearing loss, mouth sores, nosebleeds, postnasal drip, rhinorrhea, sinus pressure, sinus pain, sneezing, sore throat, tinnitus, trouble swallowing and voice change.    Eyes: Negative for photophobia, pain, discharge, redness, itching and visual disturbance.   Respiratory: Negative for apnea, cough, choking, chest tightness, shortness of breath, wheezing and stridor.    Cardiovascular: Negative for chest pain, palpitations and leg swelling.   Gastrointestinal: Negative for abdominal distention, abdominal pain, anal bleeding, blood in stool, constipation, diarrhea, nausea, rectal pain and vomiting.   Endocrine: Negative for cold intolerance, heat intolerance, polydipsia, polyphagia and polyuria.   Genitourinary: Negative for decreased urine volume, difficulty urinating, dysuria, enuresis, flank pain, frequency, genital sores, hematuria and urgency.   Musculoskeletal: Positive for back pain and neck pain. Negative for arthralgias, gait problem, joint swelling, myalgias and neck stiffness.   Skin: Negative  for color change, pallor, rash and wound.   Allergic/Immunologic: Negative for environmental allergies, food allergies and immunocompromised state.   Neurological: Negative for dizziness, tremors, seizures, syncope, facial asymmetry, speech difficulty, weakness, light-headedness, numbness and headaches.   Hematological: Negative for adenopathy. Does not bruise/bleed easily.   Psychiatric/Behavioral: Negative for agitation, behavioral problems, confusion, decreased concentration, dysphoric mood, hallucinations, self-injury, sleep disturbance and suicidal ideas. The patient is not nervous/anxious and is not hyperactive.    All other systems reviewed and are negative.     I have reviewed this note template and all pertinent parts of the review of systems social, family history, surgical history and medication list      Physical Examination:  There were no vitals filed for this visit.   General Appearance:   Well developed, well nourished, well groomed, alert, and cooperative.  Neurological examination:  Neurologic Exam  Vital signs were reviewed and documented in the chart  Patient appeared in good neurologic function with normal comprehension fluent speech  Mood and affect are normal  Sense of smell deferred    Covid mask left in place    Cervical spine without masses    Age-related limited range of motion no Spurling's to speak of on passive range and active range of motion    Muscle bulk and tone normal  5 out of 5 strength no motor drift  Gait normal intact  Negative Romberg  No clonus long tract signs or myelopathy    Reflexes symmetric he does have relatively good reflexes bilaterally    No edema noted and extremities skin appears normal warm and well perfused    Straight leg raise sign absent  No signs of intrinsic hip dysfunction some decreased range of motion the hips but overall very good  Back is without any lesions or abnormality well-healed incision  Feet are warm and well perfused      Review of  Imaging/DATA:  I reviewed both moderate cervical and lumbar spinal MRIs personally the lumbar spine shows grade 1 spondylolisthesis L4-5 with probably intraforaminal spinal stenosis and neural narrowing of the L4 exiting nerve root of moderate intensity    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 Olea is a 80 y.o. 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  I explained the risk benefits and expected outcome of major elective surgery for their problem, complications from approach, and infection, the risk of neurologic implications after surgery as well as need for repeat surgeries and most importantly failure to achieve quality of life improvement from the surgery to the patient.  I think at his age with the complexities of his neck I see no simple solutions especially given most of the upper cervical aspects of the pain    I counseled him that if he developed symptoms of myelopathy isolated nerve root involvement etc. it would be different    Additionally for his  lumbar spine he could contemplate spinal stimulation transforaminal lumbar or blocks at the right L4-5 area etc. as well as oral medication therapy I cautioned him about durable outcomes are quick fixes    He appears comfortable with the plan    Would be a pleasure to see him back anytime in the future but I suspect that in the absence of myelopathy we will manage him nonsurgically

## 2021-10-25 PROCEDURE — 93248 EXT ECG>7D<15D REV&INTERPJ: CPT | Performed by: INTERNAL MEDICINE

## 2021-10-28 ENCOUNTER — OFFICE VISIT (OUTPATIENT)
Dept: CARDIOLOGY | Facility: CLINIC | Age: 81
End: 2021-10-28

## 2021-10-28 VITALS
WEIGHT: 181 LBS | HEART RATE: 55 BPM | BODY MASS INDEX: 25.91 KG/M2 | SYSTOLIC BLOOD PRESSURE: 130 MMHG | OXYGEN SATURATION: 95 % | HEIGHT: 70 IN | DIASTOLIC BLOOD PRESSURE: 74 MMHG | TEMPERATURE: 95.4 F

## 2021-10-28 DIAGNOSIS — I48.0 PAROXYSMAL ATRIAL FIBRILLATION (HCC): Primary | ICD-10-CM

## 2021-10-28 PROCEDURE — 99214 OFFICE O/P EST MOD 30 MIN: CPT | Performed by: INTERNAL MEDICINE

## 2021-10-28 RX ORDER — FLECAINIDE ACETATE 50 MG/1
50 TABLET ORAL 2 TIMES DAILY
Qty: 60 TABLET | Refills: 11 | Status: SHIPPED | OUTPATIENT
Start: 2021-10-28 | End: 2022-01-10

## 2021-10-28 RX ORDER — DILTIAZEM HYDROCHLORIDE 120 MG/1
120 CAPSULE, COATED, EXTENDED RELEASE ORAL DAILY
Qty: 30 CAPSULE | Refills: 11 | Status: SHIPPED | OUTPATIENT
Start: 2021-10-28

## 2021-11-01 ENCOUNTER — HOSPITAL ENCOUNTER (OUTPATIENT)
Dept: RESPIRATORY THERAPY | Facility: HOSPITAL | Age: 81
Discharge: HOME | End: 2021-11-01

## 2021-11-01 DIAGNOSIS — I48.0 PAROXYSMAL ATRIAL FIBRILLATION (HCC): ICD-10-CM

## 2021-11-01 DIAGNOSIS — Z79.899 ENCOUNTER FOR MONITORING FLECAINIDE THERAPY: Primary | ICD-10-CM

## 2021-11-01 DIAGNOSIS — Z51.81 ENCOUNTER FOR MONITORING FLECAINIDE THERAPY: Primary | ICD-10-CM

## 2021-11-01 LAB
QT INTERVAL: 462 MS
QTC INTERVAL: 445 MS

## 2021-11-01 PROCEDURE — 93005 ELECTROCARDIOGRAM TRACING: CPT | Performed by: INTERNAL MEDICINE

## 2021-12-20 ENCOUNTER — HOSPITAL ENCOUNTER (OUTPATIENT)
Dept: RESPIRATORY THERAPY | Facility: HOSPITAL | Age: 81
Discharge: HOME | End: 2021-12-20

## 2021-12-20 DIAGNOSIS — Z79.899 ENCOUNTER FOR MONITORING FLECAINIDE THERAPY: ICD-10-CM

## 2021-12-20 DIAGNOSIS — Z51.81 ENCOUNTER FOR MONITORING FLECAINIDE THERAPY: ICD-10-CM

## 2021-12-20 PROCEDURE — 93005 ELECTROCARDIOGRAM TRACING: CPT | Performed by: INTERNAL MEDICINE

## 2021-12-20 PROCEDURE — 93010 ELECTROCARDIOGRAM REPORT: CPT | Performed by: INTERNAL MEDICINE

## 2021-12-22 LAB
QT INTERVAL: 512 MS
QTC INTERVAL: 427 MS

## 2022-01-10 ENCOUNTER — TELEPHONE (OUTPATIENT)
Dept: CARDIOLOGY | Facility: CLINIC | Age: 82
End: 2022-01-10

## 2022-01-10 RX ORDER — FLECAINIDE ACETATE 100 MG/1
100 TABLET ORAL 2 TIMES DAILY
Qty: 60 TABLET | Refills: 5 | Status: SHIPPED | OUTPATIENT
Start: 2022-01-10 | End: 2022-07-11 | Stop reason: SDUPTHER

## 2022-01-10 RX ORDER — FLECAINIDE ACETATE 100 MG/1
100 TABLET ORAL 2 TIMES DAILY
Qty: 60 TABLET | Refills: 5 | Status: SHIPPED | OUTPATIENT
Start: 2022-01-10 | End: 2022-01-10 | Stop reason: SDUPTHER

## 2022-01-10 NOTE — TELEPHONE ENCOUNTER
apothecary called stated that Henry came in stating that  was going to be sending in a new script for his flecainide 50 mg stating for him to take 2 tabs BID instead of one tab BID.

## 2022-01-13 DIAGNOSIS — Z79.899 ENCOUNTER FOR MONITORING FLECAINIDE THERAPY: Primary | ICD-10-CM

## 2022-01-13 DIAGNOSIS — Z51.81 ENCOUNTER FOR MONITORING FLECAINIDE THERAPY: Primary | ICD-10-CM

## 2022-01-14 ENCOUNTER — HOSPITAL ENCOUNTER (EMERGENCY)
Facility: HOSPITAL | Age: 82
Discharge: HOME | End: 2022-01-14
Attending: FAMILY MEDICINE | Admitting: STUDENT IN AN ORGANIZED HEALTH CARE EDUCATION/TRAINING PROGRAM

## 2022-01-14 ENCOUNTER — APPOINTMENT (OUTPATIENT)
Dept: CT IMAGING | Facility: HOSPITAL | Age: 82
End: 2022-01-14

## 2022-01-14 ENCOUNTER — HOSPITAL ENCOUNTER (OUTPATIENT)
Dept: RESPIRATORY THERAPY | Facility: HOSPITAL | Age: 82
Discharge: HOME | End: 2022-01-14
Admitting: INTERNAL MEDICINE

## 2022-01-14 ENCOUNTER — OFFICE VISIT (OUTPATIENT)
Dept: SURGERY | Facility: CLINIC | Age: 82
End: 2022-01-14

## 2022-01-14 VITALS
HEIGHT: 70 IN | OXYGEN SATURATION: 97 % | DIASTOLIC BLOOD PRESSURE: 88 MMHG | WEIGHT: 181 LBS | BODY MASS INDEX: 25.91 KG/M2 | RESPIRATION RATE: 20 BRPM | SYSTOLIC BLOOD PRESSURE: 187 MMHG | HEART RATE: 62 BPM | TEMPERATURE: 98.8 F

## 2022-01-14 VITALS
HEIGHT: 70 IN | WEIGHT: 181 LBS | HEART RATE: 59 BPM | SYSTOLIC BLOOD PRESSURE: 142 MMHG | DIASTOLIC BLOOD PRESSURE: 86 MMHG | BODY MASS INDEX: 25.91 KG/M2

## 2022-01-14 DIAGNOSIS — S09.90XA INJURY OF HEAD, INITIAL ENCOUNTER: Primary | ICD-10-CM

## 2022-01-14 DIAGNOSIS — S01.91XA LACERATION OF HEAD WITHOUT FOREIGN BODY, UNSPECIFIED PART OF HEAD, INITIAL ENCOUNTER: ICD-10-CM

## 2022-01-14 DIAGNOSIS — Z79.899 ENCOUNTER FOR MONITORING FLECAINIDE THERAPY: ICD-10-CM

## 2022-01-14 DIAGNOSIS — Z51.81 ENCOUNTER FOR MONITORING FLECAINIDE THERAPY: ICD-10-CM

## 2022-01-14 DIAGNOSIS — D48.5 NEOPLASM OF UNCERTAIN BEHAVIOR OF SKIN: Primary | ICD-10-CM

## 2022-01-14 LAB
QT INTERVAL: 508 MS
QTC INTERVAL: 476 MS

## 2022-01-14 PROCEDURE — 90471 IMMUNIZATION ADMIN: CPT | Performed by: PHYSICIAN ASSISTANT

## 2022-01-14 PROCEDURE — 25010000002 TETANUS-DIPHTH-ACELL PERTUSSIS 5-2.5-18.5 LF-MCG/0.5 SUSPENSION PREFILLED SYRINGE: Performed by: PHYSICIAN ASSISTANT

## 2022-01-14 PROCEDURE — 99282 EMERGENCY DEPT VISIT SF MDM: CPT

## 2022-01-14 PROCEDURE — 93005 ELECTROCARDIOGRAM TRACING: CPT | Performed by: INTERNAL MEDICINE

## 2022-01-14 PROCEDURE — 93010 ELECTROCARDIOGRAM REPORT: CPT | Performed by: INTERNAL MEDICINE

## 2022-01-14 PROCEDURE — 72125 CT NECK SPINE W/O DYE: CPT

## 2022-01-14 PROCEDURE — 70450 CT HEAD/BRAIN W/O DYE: CPT

## 2022-01-14 PROCEDURE — 90715 TDAP VACCINE 7 YRS/> IM: CPT | Performed by: PHYSICIAN ASSISTANT

## 2022-01-14 PROCEDURE — 99212 OFFICE O/P EST SF 10 MIN: CPT | Performed by: SURGERY

## 2022-01-14 RX ORDER — LIDOCAINE HYDROCHLORIDE 10 MG/ML
5 INJECTION, SOLUTION EPIDURAL; INFILTRATION; INTRACAUDAL; PERINEURAL ONCE
Status: COMPLETED | OUTPATIENT
Start: 2022-01-14 | End: 2022-01-14

## 2022-01-14 RX ADMIN — TETANUS TOXOID, REDUCED DIPHTHERIA TOXOID AND ACELLULAR PERTUSSIS VACCINE, ADSORBED 0.5 ML: 5; 2.5; 8; 8; 2.5 SUSPENSION INTRAMUSCULAR at 22:30

## 2022-01-14 RX ADMIN — LIDOCAINE HYDROCHLORIDE 5 ML: 10 INJECTION, SOLUTION EPIDURAL; INFILTRATION; INTRACAUDAL; PERINEURAL at 22:00

## 2022-01-14 NOTE — PROGRESS NOTES
"Subjective   Henry Olea is a 81 y.o. male here today for place behind ear.    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  This is failed to heal over for several months.  He also reports a raised skin lesion which has been present for some time and not healed of the left hand over the fifth metacarpal phalangeal head.  No Known Allergies    Current Outpatient Medications   Medication Sig Dispense Refill   • dilTIAZem CD (CARDIZEM CD) 120 MG 24 hr capsule Take 1 capsule by mouth Daily. 30 capsule 11   • flecainide (TAMBOCOR) 100 MG tablet Take 1 tablet by mouth 2 (Two) Times a Day. 60 tablet 5   • Omega-3 Fatty Acids (fish oil) 1000 MG capsule capsule Take  by mouth Daily With Breakfast.     • pantoprazole (PROTONIX) 40 MG EC tablet Take 1 tablet by mouth Daily. 90 tablet 4   • vitamin C (ASCORBIC ACID) 250 MG tablet Take 250 mg by mouth Daily.     • apixaban (ELIQUIS) 5 MG tablet tablet Take 1 tablet by mouth Every 12 (Twelve) Hours. 60 tablet 3     No current facility-administered medications for this visit.     Past Medical History:   Diagnosis Date   • Pre-diabetes      Past Surgical History:   Procedure Laterality Date   • BACK SURGERY  2004 2012   • COLON RESECTION     • HUMERUS FRACTURE SURGERY     • ROTATOR CUFF REPAIR Right 2000   • SKIN CANCER EXCISION  2010    Melanoma resection       Pertinent Review of Systems  Patient has discontinued his Eliquis in anticipation of excision    Objective   /86 (BP Location: Left arm)   Pulse 59   Ht 177.8 cm (70\")   Wt 82.1 kg (181 lb)   BMI 25.97 kg/m²    Physical Exam  Left posterior neck approximately 1.2 cm raised nodular area with small central ulceration.  Left hand 1.5 cm raised firm nodule with 2 mm ulceration    Procedures   Procedure Note    Procedure: Excision skin lesions    Location: Left posterior neck and left dorsum of hand over fifth metatarsal " phalangeal joint    Anesthesia: 1% lidocaine with epinephrine    Description:  The risks and benefits of the procedure were discussed.  Both areas were excised to grossly clear margins after prep with Betadine and infiltration with lidocaine with epinephrine.  Lesions were excised and the specimens were marked with suture for orientation.  Both incisions were closed in a 2 layer closure of interrupted 3-0 Vicryl subdermal sutures, followed by 4-0 Monocryl.  Length of hand lesion excision was 1.5 cm at the left neck was 1.8 cm.  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 indicating that he is within normal range (BMI 18.5-24.9). No BMI management plan needed..         Future Appointments   Date Time Provider Department Center   1/14/2022 10:00 AM  [facility]OM  [facility]         Please note that portions of this note were completed with a voice recognition program.

## 2022-01-15 NOTE — ED NOTES
MEDICAL SCREENING:    Reason for Visit: fall with head injury; unsure mechanism of fall, but did strike head. Denies LOC. Takes anticoagulation medications. Endorses severe HA.    Patient initially seen in triage.  The patient was advised further evaluation and diagnostic testing will be needed, some of the treatment and testing will be initiated in the lobby in order to begin the process.  The patient will be returned to the waiting area for the time being and possibly be re-assessed by a subsequent ED provider.  The patient will be brought back to the treatment area in as timely manner as possible.         Ran Palma PA-C  01/14/22 [id number]

## 2022-01-15 NOTE — ED PROVIDER NOTES
Subjective   81 year old male with past medical hx of pre-diabetes presents to the ED s/p fall. Patient states he fell just PTA after tripping over his own feet by accident. Patient states he fell forward and hit head on metal pole, however the gentleman with patient states he thinks he hit the edge of the concrete. Denies LOC. He does report a headache and wound to head. Also concerned because he does take anticoagulation medication. Not up to date on tetanus to his knowledge. Denies any other injuries, complaints, or concerns.      History provided by:  Patient   used: No        Review of Systems   Constitutional: Negative.  Negative for fever.   Respiratory: Negative.    Cardiovascular: Negative.  Negative for chest pain.   Gastrointestinal: Negative.  Negative for abdominal pain.   Endocrine: Negative.    Genitourinary: Negative.  Negative for dysuria.   Musculoskeletal: Positive for neck pain. Negative for back pain.   Skin: Positive for wound.   Neurological: Positive for headaches.   Psychiatric/Behavioral: Negative.    All other systems reviewed and are negative.      Past Medical History:   Diagnosis Date   • Pre-diabetes        No Known Allergies    Past Surgical History:   Procedure Laterality Date   • BACK SURGERY  2004   • COLON RESECTION     • HUMERUS FRACTURE SURGERY     • ROTATOR CUFF REPAIR Right    • SKIN CANCER EXCISION  2010    Melanoma resection       Family History   Problem Relation Age of Onset   • Diverticulitis Mother    • Rheum arthritis Mother    • Diabetes Mother    • Lung cancer Father    • Heart disease Father    • Heart disease Brother        Social History     Socioeconomic History   • Marital status:    Tobacco Use   • Smoking status: Former Smoker     Quit date:      Years since quittin.0   • Smokeless tobacco: Never Used   Vaping Use   • Vaping Use: Never used   Substance and Sexual Activity   • Alcohol use: Yes     Comment: 3 oz     • Drug use: No   • Sexual activity: Defer           Objective   Physical Exam  Vitals and nursing note reviewed.   Constitutional:       General: He is not in acute distress.     Appearance: He is well-developed. He is not diaphoretic.   HENT:      Head: Normocephalic and atraumatic.      Right Ear: External ear normal.      Left Ear: External ear normal.      Nose: Nose normal.   Eyes:      Conjunctiva/sclera: Conjunctivae normal.      Pupils: Pupils are equal, round, and reactive to light.   Neck:      Vascular: No JVD.      Trachea: No tracheal deviation.   Cardiovascular:      Rate and Rhythm: Normal rate and regular rhythm.      Heart sounds: Normal heart sounds. No murmur heard.      Pulmonary:      Effort: Pulmonary effort is normal. No respiratory distress.      Breath sounds: Normal breath sounds. No wheezing.   Abdominal:      General: Bowel sounds are normal.      Palpations: Abdomen is soft.      Tenderness: There is no abdominal tenderness.   Musculoskeletal:         General: No deformity. Normal range of motion.      Cervical back: Normal range of motion and neck supple.   Skin:     General: Skin is warm and dry.      Coloration: Skin is not pale.      Findings: Laceration present. No erythema or rash.          Neurological:      General: No focal deficit present.      Mental Status: He is alert and oriented to person, place, and time.      Cranial Nerves: Cranial nerves are intact. No cranial nerve deficit.      Sensory: Sensation is intact.      Motor: Motor function is intact.      Coordination: Coordination is intact.      Gait: Gait is intact.   Psychiatric:         Behavior: Behavior normal.         Thought Content: Thought content normal.         Laceration Repair    Date/Time: 1/14/2022 10:17 PM  Performed by: Ran Palma PA-C  Authorized by: Natanael Connolly MD     Consent:     Consent obtained:  Verbal    Consent given by:  Patient    Risks discussed:  Infection, need for  additional repair, nerve damage, pain, poor cosmetic result, poor wound healing and tendon damage    Alternatives discussed:  No treatment, delayed treatment, observation and referral  Universal protocol:     Procedure explained and questions answered to patient or proxy's satisfaction: yes      Relevant documents present and verified: yes      Test results available and properly labeled: yes      Imaging studies available: yes      Required blood products, implants, devices, and special equipment available: yes      Site/side marked: yes      Immediately prior to procedure, a time out was called: yes      Patient identity confirmed:  Verbally with patient, arm band, provided demographic data and hospital-assigned identification number  Anesthesia (see MAR for exact dosages):     Anesthesia method:  Local infiltration    Local anesthetic:  Lidocaine 1% w/o epi  Laceration details:     Location:  Scalp    Scalp location:  Frontal    Length (cm):  2  Repair type:     Repair type:  Simple  Pre-procedure details:     Preparation:  Patient was prepped and draped in usual sterile fashion  Exploration:     Hemostasis achieved with:  Direct pressure    Wound exploration: wound explored through full range of motion      Wound extent: no foreign bodies/material noted    Treatment:     Area cleansed with:  Hibiclens    Amount of cleaning:  Standard    Irrigation solution:  Sterile water    Irrigation method:  Syringe  Skin repair:     Repair method:  Sutures    Suture size:  5-0    Suture material:  Fast-absorbing gut    Suture technique:  Simple interrupted    Number of sutures:  4  Approximation:     Approximation:  Close  Post-procedure details:     Dressing:  Non-adherent dressing    Patient tolerance of procedure:  Tolerated well, no immediate complications  Comments:      Pt tolerated procedure well. No acute complications.               ED Course  ED Course as of 01/14/22 2301   Fri Jan 14, 2022   0069 CT Head Without  "Contrast [TK]   2216 CT Cervical Spine Without Contrast [TK]      ED Course User Index  [TK] Ran Palma PA-C                                     Emergency Medicine: Utilization of CT for Minor Blunt Head Trauma (Adult)    [x]  Patient is 18 or older, presenting with minor blunt head trauma.  Head CT was ordered by  an emergency care clinician for trauma because:           [x]  Patient is 65 or older         []  Patient GCS< 15         []  Patient has focal neurologic deficit         [x]  Patient has severe headache         []  Patient is vomiting         []  Severe/Mechanism of injury was identified (Also, select one or more below)              []  MVA with: patient ejection, death of another passenger, rollover, speed >40 mph, airbag deployment,  or passenger on ATV or motorcycle              []  pedestrian or bicyclist without helmet: struck my motorized vehicle, in bicycle crash               []  fall > 3 feet or 5 stairs              []  head struck by high-impact object (hammer, baseball, baseball bat, heavy object such as falling brick              []  Other _________________________________________________    []  Patient has physical signs of basilar skull fracture present (including hemotympanum, \"raccoon\" eyes, CSF leakage from ear or nose, Hester's sign)    [x]  Patient suspected of taking anticoagulant medication    []  Patient has thrombocytopenia    []  Patient has coagulopathy    []  Patient has loss of consciousness and (must, select one of the following):                 []  Headache              []  Short term memory deficit              []  Alcohol/drug intoxication              []  Evidence of trauma above the clavicles              []  Age 60 or older              []  Post-traumatic seizure    []  Patient has post-traumatic amnesia and (must, select one of the following):                  []  Headache              []  Short term memory deficit              []  Alcohol/drug " intoxication              []  Evidence of trauma above the clavicles              []  Age 60 or older              []  Post-traumatic seizure    []  Patient conditions that are excluded (select all that apply)        []  Patient has ventricular shunt        []  Patient has brain tumor        []  Patient is pregnant        []  Patient has multi-system trauma        []  Patient taking an antiplatelet medication (excluding aspirin)        []  Head CT not ordered by emergency care clinician        []  Head CT ordered for reasons other than trauma                      MDM  Number of Diagnoses or Management Options  Injury of head, initial encounter: new and requires workup  Laceration of head without foreign body, unspecified part of head, initial encounter: new and requires workup     Amount and/or Complexity of Data Reviewed  Tests in the radiology section of CPT®: reviewed and ordered    Risk of Complications, Morbidity, and/or Mortality  Presenting problems: moderate  Diagnostic procedures: moderate  Management options: moderate    Patient Progress  Patient progress: stable      Final diagnoses:   Injury of head, initial encounter   Laceration of head without foreign body, unspecified part of head, initial encounter       ED Disposition  ED Disposition     ED Disposition Condition Comment    Discharge Stable           Jimmy Zhang MD  [address] [facility] [address] [phone]    In 2 days           Medication List      No changes were made to your prescriptions during this visit.          Ran Palma PA-C  01/14/22 8271

## 2022-01-19 DIAGNOSIS — I48.0 PAROXYSMAL ATRIAL FIBRILLATION: Primary | ICD-10-CM

## 2022-01-20 ENCOUNTER — HOSPITAL ENCOUNTER (OUTPATIENT)
Dept: RESPIRATORY THERAPY | Facility: HOSPITAL | Age: 82
Discharge: HOME | End: 2022-01-20

## 2022-01-20 DIAGNOSIS — I48.0 PAROXYSMAL ATRIAL FIBRILLATION: ICD-10-CM

## 2022-01-20 PROCEDURE — 93005 ELECTROCARDIOGRAM TRACING: CPT | Performed by: INTERNAL MEDICINE

## 2022-01-21 LAB
QT INTERVAL: 478 MS
QTC INTERVAL: 457 MS

## 2022-02-05 ENCOUNTER — HOSPITAL ENCOUNTER (EMERGENCY)
Facility: HOSPITAL | Age: 82
Discharge: HOME | End: 2022-02-05
Attending: EMERGENCY MEDICINE | Admitting: EMERGENCY MEDICINE

## 2022-02-05 VITALS
RESPIRATION RATE: 18 BRPM | SYSTOLIC BLOOD PRESSURE: 154 MMHG | TEMPERATURE: 98.1 F | WEIGHT: 175 LBS | HEIGHT: 70 IN | DIASTOLIC BLOOD PRESSURE: 52 MMHG | BODY MASS INDEX: 25.05 KG/M2 | HEART RATE: 61 BPM | OXYGEN SATURATION: 96 %

## 2022-02-05 DIAGNOSIS — Z20.822 EXPOSURE TO COVID-19 VIRUS: Primary | ICD-10-CM

## 2022-02-05 LAB
FLUAV RNA RESP QL NAA+PROBE: NOT DETECTED
FLUBV RNA RESP QL NAA+PROBE: NOT DETECTED
SARS-COV-2 RNA RESP QL NAA+PROBE: NOT DETECTED

## 2022-02-05 PROCEDURE — 87636 SARSCOV2 & INF A&B AMP PRB: CPT | Performed by: NURSE PRACTITIONER

## 2022-02-05 PROCEDURE — C9803 HOPD COVID-19 SPEC COLLECT: HCPCS

## 2022-02-05 PROCEDURE — 99283 EMERGENCY DEPT VISIT LOW MDM: CPT

## 2022-02-07 NOTE — ED PROVIDER NOTES
Subjective   Patient is an 81-year-old male with past medical history positive for prediabetes complaints of exposure to COVID-19.  Patient's wife tested positive for COVID-19 today.  Patient has no symptoms of COVID-19.      History provided by:  Patient   used: No        Review of Systems   Constitutional: Negative.  Negative for fever.   HENT: Negative.    Respiratory: Negative.    Cardiovascular: Negative.  Negative for chest pain.   Gastrointestinal: Negative.  Negative for abdominal pain.   Endocrine: Negative.    Genitourinary: Negative.  Negative for dysuria.   Skin: Negative.    Neurological: Negative.    Psychiatric/Behavioral: Negative.    All other systems reviewed and are negative.      Past Medical History:   Diagnosis Date   • Pre-diabetes        No Known Allergies    Past Surgical History:   Procedure Laterality Date   • BACK SURGERY  2004    2012   • COLON RESECTION     • HUMERUS FRACTURE SURGERY     • ROTATOR CUFF REPAIR Right 2000   • SKIN CANCER EXCISION  2010    Melanoma resection       Family History   Problem Relation Age of Onset   • Diverticulitis Mother    • Rheum arthritis Mother    • Diabetes Mother    • Lung cancer Father    • Heart disease Father    • Heart disease Brother        Social History     Socioeconomic History   • Marital status:    Tobacco Use   • Smoking status: Former Smoker     Quit date:      Years since quittin.1   • Smokeless tobacco: Never Used   Vaping Use   • Vaping Use: Never used   Substance and Sexual Activity   • Alcohol use: Yes     Comment: 3 oz    • Drug use: No   • Sexual activity: Defer           Objective   Physical Exam  Vitals and nursing note reviewed.   Constitutional:       General: He is not in acute distress.     Appearance: He is well-developed. He is not diaphoretic.   HENT:      Head: Normocephalic and atraumatic.      Right Ear: External ear normal.      Left Ear: External ear normal.      Nose: Nose normal.    Eyes:      Conjunctiva/sclera: Conjunctivae normal.      Pupils: Pupils are equal, round, and reactive to light.   Neck:      Vascular: No JVD.      Trachea: No tracheal deviation.   Cardiovascular:      Rate and Rhythm: Normal rate and regular rhythm.      Heart sounds: Normal heart sounds. No murmur heard.      Pulmonary:      Effort: Pulmonary effort is normal. No respiratory distress.      Breath sounds: Normal breath sounds. No wheezing.   Abdominal:      General: Bowel sounds are normal.      Palpations: Abdomen is soft.      Tenderness: There is no abdominal tenderness.   Musculoskeletal:         General: No deformity. Normal range of motion.      Cervical back: Normal range of motion and neck supple.   Skin:     General: Skin is warm and dry.      Coloration: Skin is not pale.      Findings: No erythema or rash.   Neurological:      Mental Status: He is alert and oriented to person, place, and time.      Cranial Nerves: No cranial nerve deficit.   Psychiatric:         Behavior: Behavior normal.         Thought Content: Thought content normal.         Procedures           ED Course                                                 MDM    Final diagnoses:   Exposure to COVID-19 virus       ED Disposition  ED Disposition     ED Disposition Condition Comment    Discharge Stable           Jimmy Zhang MD  [address] [facility] [address] [phone]    Schedule an appointment as soon as possible for a visit in 2 days           Medication List      No changes were made to your prescriptions during this visit.          Lana Beard, APRN  02/07/22 0156

## 2022-03-26 ENCOUNTER — APPOINTMENT (OUTPATIENT)
Dept: CT IMAGING | Facility: HOSPITAL | Age: 82
End: 2022-03-26

## 2022-03-26 ENCOUNTER — APPOINTMENT (OUTPATIENT)
Dept: GENERAL RADIOLOGY | Facility: HOSPITAL | Age: 82
End: 2022-03-26

## 2022-03-26 ENCOUNTER — HOSPITAL ENCOUNTER (EMERGENCY)
Facility: HOSPITAL | Age: 82
Discharge: HOME | End: 2022-03-26
Attending: EMERGENCY MEDICINE | Admitting: EMERGENCY MEDICINE

## 2022-03-26 VITALS
DIASTOLIC BLOOD PRESSURE: 105 MMHG | RESPIRATION RATE: 20 BRPM | BODY MASS INDEX: 25.05 KG/M2 | SYSTOLIC BLOOD PRESSURE: 153 MMHG | OXYGEN SATURATION: 95 % | TEMPERATURE: 97.5 F | HEIGHT: 70 IN | HEART RATE: 68 BPM | WEIGHT: 175 LBS

## 2022-03-26 DIAGNOSIS — S01.81XA FACIAL LACERATION, INITIAL ENCOUNTER: Primary | ICD-10-CM

## 2022-03-26 DIAGNOSIS — T07.XXXA MULTIPLE ABRASIONS: ICD-10-CM

## 2022-03-26 DIAGNOSIS — S63.657A SPRAIN OF METACARPOPHALANGEAL (MCP) JOINT OF LEFT LITTLE FINGER, INITIAL ENCOUNTER: ICD-10-CM

## 2022-03-26 DIAGNOSIS — S00.83XA CONTUSION OF FACE, INITIAL ENCOUNTER: ICD-10-CM

## 2022-03-26 PROCEDURE — 99283 EMERGENCY DEPT VISIT LOW MDM: CPT

## 2022-03-26 PROCEDURE — 70450 CT HEAD/BRAIN W/O DYE: CPT

## 2022-03-26 PROCEDURE — 70486 CT MAXILLOFACIAL W/O DYE: CPT

## 2022-03-26 PROCEDURE — 73130 X-RAY EXAM OF HAND: CPT | Performed by: RADIOLOGY

## 2022-03-26 PROCEDURE — 73130 X-RAY EXAM OF HAND: CPT

## 2022-03-26 PROCEDURE — 70450 CT HEAD/BRAIN W/O DYE: CPT | Performed by: RADIOLOGY

## 2022-03-26 PROCEDURE — 70486 CT MAXILLOFACIAL W/O DYE: CPT | Performed by: RADIOLOGY

## 2022-03-26 RX ORDER — ACETAMINOPHEN AND CODEINE PHOSPHATE 300; 30 MG/1; MG/1
1 TABLET ORAL EVERY 6 HOURS PRN
Qty: 12 TABLET | Refills: 0 | Status: SHIPPED | OUTPATIENT
Start: 2022-03-26 | End: 2022-03-27

## 2022-03-26 RX ORDER — HYDROCODONE BITARTRATE AND ACETAMINOPHEN 7.5; 325 MG/1; MG/1
1 TABLET ORAL ONCE
Status: COMPLETED | OUTPATIENT
Start: 2022-03-26 | End: 2022-03-26

## 2022-03-26 RX ORDER — IBUPROFEN 600 MG/1
600 TABLET ORAL EVERY 6 HOURS PRN
Qty: 30 TABLET | Refills: 0 | Status: SHIPPED | OUTPATIENT
Start: 2022-03-26 | End: 2022-03-27

## 2022-03-26 RX ADMIN — HYDROCODONE BITARTRATE AND ACETAMINOPHEN 1 TABLET: 7.5; 325 TABLET ORAL at 12:01

## 2022-03-26 NOTE — ED PROVIDER NOTES
Subjective   81-year-old white male presents after fall.  Patient was playing tennis today when he tripped and fell.  He has lacerations on his head and face and left hand injury with radial deviation of his fifth digit with pain in his hand and fifth finger.  He denies any loss of consciousness.  He is on Eliquis for intermittent atrial fibrillation.  He denied any back pain, chest pain, other joint pain, or other complaints.          Review of Systems   All other systems reviewed and are negative.      No past medical history on file.    No Known Allergies    No past surgical history on file.    No family history on file.    Social History     Socioeconomic History   • Marital status:            Objective   Physical Exam  Vitals and nursing note reviewed.   Constitutional:       Appearance: Normal appearance. He is normal weight.   HENT:      Head: Normocephalic and atraumatic.        Comments: Laceration #1: 2 cm transverse superficial laceration in the left forehead not through the dermis with no active bleeding or foreign body noted.  Laceration #2: 4 mm transverse laceration on the nasal bridge with no active bleeding or foreign body noted.  Laceration #3: Small 1 to 2 mm superficial laceration of the left orbit just lateral to the canthus without active bleeding or foreign body noted..  Cardiovascular:      Rate and Rhythm: Normal rate and regular rhythm.      Heart sounds: Normal heart sounds. No murmur heard.    No friction rub. No gallop.   Pulmonary:      Effort: Pulmonary effort is normal. No respiratory distress.      Breath sounds: Normal breath sounds. No wheezing, rhonchi or rales.   Abdominal:      General: Abdomen is flat. Bowel sounds are normal. There is no distension.      Palpations: Abdomen is soft.      Tenderness: There is no abdominal tenderness.   Musculoskeletal:        Arms:       Comments: Left fifth finger displaced with radial deviation.  Decreased range of motion to flexion  and extension due to pain.  Mild swelling with tenderness at the metacarpal phalangeal joint.  Mild distal metacarpal tenderness.  Capillary refill and sensation intact.   Skin:     General: Skin is warm and dry.   Neurological:      General: No focal deficit present.      Mental Status: He is alert and oriented to person, place, and time.   Psychiatric:         Mood and Affect: Mood normal.         Behavior: Behavior normal.         Laceration Repair    Date/Time: 3/26/2022 1:22 PM  Performed by: Ángel Pratt MD  Authorized by: Ángel Pratt MD     Consent:     Consent obtained:  Verbal    Consent given by:  Patient    Risks, benefits, and alternatives were discussed: yes      Risks discussed:  Infection    Alternatives discussed:  No treatment  Universal protocol:     Patient identity confirmed:  Verbally with patient  Anesthesia:     Anesthesia method:  None  Laceration details:     Location:  Face    Face location:  Nose    Length (cm):  0.4  Pre-procedure details:     Preparation:  Patient was prepped and draped in usual sterile fashion and imaging obtained to evaluate for foreign bodies  Exploration:     Limited defect created (wound extended): no      Hemostasis achieved with:  Direct pressure    Imaging outcome: foreign body not noted      Wound exploration: entire depth of wound visualized      Contaminated: no    Treatment:     Area cleansed with:  Saline    Amount of cleaning:  Standard    Debridement:  None    Undermining:  None  Skin repair:     Repair method:  Tissue adhesive  Approximation:     Approximation:  Close  Repair type:     Repair type:  Simple               ED Course  ED Course as of 03/26/22 1330   Sat Mar 26, 2022   1327 Reviewed results of imaging with patient and son.  No evidence of intracranial injury.  He does have nasal fractures which radiologist felt to be old, but clinically were in the same area as his laceration.  They also noted a fracture on the volar aspect of his fifth  proximal middle phalanx.  Clinically he does not have swelling pain in this area, but does have swelling and pain in the proximal phalanx and PIP of his third finger as well as dorsal hand in the fifth metacarpal area.  This is likely due to ligamentous injury given his x-ray findings.  Will follow up with Ortho.  Have discussed with patient and son regarding head injury, especially since the patient is chronically on Eliquis.  They voiced understanding to return for any altered mental status, blurred vision, slurred speech, nausea, vomiting, headache, confusion or other new symptoms. [BC]      ED Course User Index  [BC] Ángel Pratt MD      XR Hand 3+ View Left    Result Date: 3/26/2022  Narrative: EXAM:   XR Left Hand Complete, 3 or More Views  EXAM DATE:   3/26/2022 11:43 AM  CLINICAL HISTORY:   Fall, left fifth dislocation/fracture  TECHNIQUE:   Frontal, lateral and oblique views of the left hand.  COMPARISON:   No relevant prior studies available.  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  Normal bone density.  Nondisplaced fracture involves the volar base of the middle phalanx left fifth digit. This may extend into the PIP joint. This is seen only on the lateral view.  No dislocation.   Soft tissues:  Unremarkable.  No radiopaque foreign body.      Impression:   Nondisplaced volar base fracture middle phalanx left fifth digit.  This report was finalized on 3/26/2022 12:16 PM by Dr. Дмитрий Guzman MD.      CT Head Without Contrast    Result Date: 3/26/2022  Narrative: EXAM:   CT Head Without Intravenous Contrast  EXAM DATE:   3/26/2022 11:39 AM  CLINICAL HISTORY:   Fall, head laceration, on Eliquis  TECHNIQUE:   Axial computed tomography images of the head/brain without intravenous contrast.  Sagittal and coronal reformatted images were created and reviewed.  This CT exam was performed using one or more of the following dose reduction techniques:  automated  exposure control, adjustment of the mA and/or kV according to patient size, and/or use of iterative reconstruction technique.  COMPARISON:   No relevant prior studies available.  FINDINGS:   Brain:  Unremarkable.  No hemorrhage.  No significant white matter disease.  No edema.   Ventricles:  Unremarkable.  No ventriculomegaly.   Bones/joints:  Unremarkable.  No acute fracture.   Soft tissues:  Left frontal scalp hematoma is noted.   Sinuses:  Unremarkable as visualized.  No acute sinusitis.   Mastoid air cells:  Unremarkable as visualized.  No mastoid effusion.      Impression: 1.  No CT evidence of acute intracranial abnormality. 2.  Left frontal scalp hematoma.  This report was finalized on 3/26/2022 12:17 PM by Dr. Дмитрий Guzman MD.      CT Facial Bones Without Contrast    Result Date: 3/26/2022  Narrative: EXAM:   CT Maxillofacial Without Intravenous Contrast  EXAM DATE:   3/26/2022 11:39 AM  CLINICAL HISTORY:   Left frontal scalp hematoma is noted. Probable old nondisplaced nasal bone fractures. No acute appearing nasal bone fracture. Zygomatic arches appear intact. The pterygoid plates are intact. Mandible is nondisplaced. No mandible fracture identified. No air-fluid levels of the maxillary sinuses. The orbits are symmetric in both globes appear intact. No orbital floor fracture identified. Degenerative changes of    Impression: upper cervical spine. IMPRESSION: Left frontal scalp hematoma. No acute appearing facial bone fractures. Probable old nondisplaced nasal bone fractures noted. Otherwise unremarkable exam. Fall, nasal laceration, on Eliquis  TECHNIQUE:   Axial computed tomography images of the face without intravenous contrast.  Sagittal and coronal reformatted images were created and reviewed.  This CT exam was performed using one or more of the following dose reduction techniques:  automated exposure control, adjustment of the mA and/or kV according to patient size, and/or use of iterative  reconstruction technique.  COMPARISON:   No relevant prior studies available.  FINDINGS:   Bones/joints:  No acute fracture.   Soft tissues:  Unremarkable.   Orbits:  Unremarkable.   Sinuses:  Unremarkable.  No air-fluid levels.  IMPRESSION:       No acute findings in the face.  This report was finalized on 3/26/2022 12:20 PM by Dr. Дмитрий Guzman MD.                                                   MDM    Final diagnoses:   Facial laceration, initial encounter   Contusion of face, initial encounter   Multiple abrasions   Sprain of metacarpophalangeal (MCP) joint of left little finger, initial encounter       ED Disposition  ED Disposition     ED Disposition   Discharge    Condition   Stable    Comment   --             Jimmy Zhang MD  [address]  [place] [address]  [phone]    In 1 week      Romel Stoner MD  [address]  [place] [address]  [phone]    Schedule an appointment as soon as possible for a visit            Medication List      New Prescriptions    acetaminophen-codeine 300-30 MG per tablet  Commonly known as: TYLENOL #3  Take 1 tablet by mouth Every 6 (Six) Hours As Needed for Moderate Pain .     ibuprofen 600 MG tablet  Commonly known as: ADVIL,MOTRIN  Take 1 tablet by mouth Every 6 (Six) Hours As Needed for Moderate Pain .           Where to Get Your Medications      These medications were sent to [facility]  [address] [place] [address]    Hours: 8AM-6PM Mon-Fri Phone: [phone]   · acetaminophen-codeine 300-30 MG per tablet  · ibuprofen 600 MG tablet          Ángel Pratt MD  03/26/22 1079

## 2022-03-28 ENCOUNTER — OFFICE VISIT (OUTPATIENT)
Dept: ORTHOPEDIC SURGERY | Facility: CLINIC | Age: 82
End: 2022-03-28

## 2022-03-28 VITALS — HEIGHT: 70 IN | BODY MASS INDEX: 25.06 KG/M2 | WEIGHT: 175.04 LBS

## 2022-03-28 DIAGNOSIS — S62.645A CLOSED NONDISPLACED FRACTURE OF PROXIMAL PHALANX OF LEFT RING FINGER, INITIAL ENCOUNTER: Primary | ICD-10-CM

## 2022-03-28 PROCEDURE — 99213 OFFICE O/P EST LOW 20 MIN: CPT | Performed by: ORTHOPAEDIC SURGERY

## 2022-04-21 ENCOUNTER — IMMUNIZATION (OUTPATIENT)
Dept: VACCINE CLINIC | Facility: HOSPITAL | Age: 82
End: 2022-04-21

## 2022-04-21 PROCEDURE — 0054A HC ADM SARSCV2 30MCG TRS-SUCR BOOSTER: CPT | Performed by: INTERNAL MEDICINE

## 2022-04-21 PROCEDURE — 91300 HC SARSCOV02 VAC 30MCG/0.3ML IM: CPT | Performed by: INTERNAL MEDICINE

## 2022-04-21 PROCEDURE — 0004A HC ADM SARSCOV2 30MCG/0.3ML BOOSTER: CPT | Performed by: INTERNAL MEDICINE

## 2022-04-21 PROCEDURE — 91305 HC SARSCOV2 VAC 30 MCG TRS-SUCR PFIZER: CPT | Performed by: INTERNAL MEDICINE

## 2022-05-16 DIAGNOSIS — I48.0 PAROXYSMAL ATRIAL FIBRILLATION: Primary | ICD-10-CM

## 2022-06-02 ENCOUNTER — DOCUMENTATION (OUTPATIENT)
Dept: CARDIOLOGY | Facility: CLINIC | Age: 82
End: 2022-06-02

## 2022-06-02 ENCOUNTER — HOSPITAL ENCOUNTER (OUTPATIENT)
Dept: RESPIRATORY THERAPY | Facility: HOSPITAL | Age: 82
Discharge: HOME | End: 2022-06-02
Admitting: PHYSICIAN ASSISTANT

## 2022-06-02 ENCOUNTER — OFFICE VISIT (OUTPATIENT)
Dept: SURGERY | Facility: CLINIC | Age: 82
End: 2022-06-02

## 2022-06-02 VITALS
DIASTOLIC BLOOD PRESSURE: 84 MMHG | WEIGHT: 179.8 LBS | HEART RATE: 48 BPM | HEIGHT: 70 IN | SYSTOLIC BLOOD PRESSURE: 136 MMHG | BODY MASS INDEX: 25.74 KG/M2

## 2022-06-02 DIAGNOSIS — L98.9 SKIN LESION: Primary | ICD-10-CM

## 2022-06-02 DIAGNOSIS — I48.0 PAROXYSMAL ATRIAL FIBRILLATION: ICD-10-CM

## 2022-06-02 DIAGNOSIS — D48.5 NEOPLASM OF UNCERTAIN BEHAVIOR OF SKIN: Primary | ICD-10-CM

## 2022-06-02 LAB
QT INTERVAL: 490 MS
QTC INTERVAL: 446 MS

## 2022-06-02 PROCEDURE — 99212 OFFICE O/P EST SF 10 MIN: CPT | Performed by: SURGERY

## 2022-06-02 PROCEDURE — 93005 ELECTROCARDIOGRAM TRACING: CPT | Performed by: PHYSICIAN ASSISTANT

## 2022-06-02 PROCEDURE — 93010 ELECTROCARDIOGRAM REPORT: CPT | Performed by: INTERNAL MEDICINE

## 2022-06-02 NOTE — PROGRESS NOTES
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  I have discussed with Dr. 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  He stated that he feels absolutely fine and is walking 2 to 3 miles a day with absolutely no symptoms of dizziness or weakness or fatigue etc.  I did alert him that the electrical abnormalities could potentially get worse with time and he he could develop high-grade AV block with which she could develop symptoms such as dizziness, weakness and syncope.  I told him to let me know as soon as he starts to get symptoms so that we can manage it appropriately.  I did alert him that he potentially could require a permanent pacemaker implantation at sometime in the future should the electro conduction get worse and he gets symptomatic with it.  He expressed understanding.  We also discussed about cutting the dose of flecainide from 100 to 50 mg twice daily but he apparently had more symptomatic PACs and PVCs at lower dose of flecainide and he would like to continue at the current dose.  He appreciated my discussion about all this.  I told him to please let me know if he has any symptoms or should he have any questions about any of these in the future.  He expressed understanding.    Sinan Nam MD [facility].  6/2/2022.

## 2022-06-02 NOTE — PROGRESS NOTES
Subjective   Henry Olea is a 81 y.o. male here today for place on left side of neck.    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  He has a history of squamous cell carcinoma.  Patient has discontinued his blood thinner 2 days ago.  No Known Allergies    Current Outpatient Medications   Medication Sig Dispense Refill   • celecoxib (CeleBREX) 200 MG capsule Take 1 capsule by mouth 2 (Two) Times a Day As Needed. 60 capsule 5   • flecainide (TAMBOCOR) 100 MG tablet Take 1 tablet by mouth 2 (Two) Times a Day. 60 tablet 5   • ibuprofen (ADVIL,MOTRIN) 600 MG tablet Take 1 tablet by mouth Every 6 (Six) Hours As Needed. 30 tablet 0   • Omega-3 Fatty Acids (fish oil) 1000 MG capsule capsule Take  by mouth Daily With Breakfast.     • pantoprazole (PROTONIX) 40 MG EC tablet Take 1 tablet by mouth Daily. 90 tablet 4   • vitamin C (ASCORBIC ACID) 250 MG tablet Take 250 mg by mouth Daily.     • Zoster Vac Recomb Adjuvanted (Shingrix) 50 MCG/0.5ML reconstituted suspension Inject 0.5 ml into the appropriate muscle as directed by prescriber. 0.5 mL 0   • acetaminophen-codeine (TYLENOL #3) 300-30 MG per tablet Take 1 tablet by mouth Every 6 (Six) Hours As Needed. 12 tablet 0   • amoxicillin (AMOXIL) 500 MG capsule Take 1 capsule by mouth 2 times Daily for 10 days. 20 capsule 0   • apixaban (ELIQUIS) 5 MG tablet tablet Take 1 tablet by mouth Every 12 (Twelve) Hours. 60 tablet 3   • dilTIAZem CD (CARDIZEM CD) 120 MG 24 hr capsule Take 1 capsule by mouth Daily. 30 capsule 11   • erythromycin (ROMYCIN) 5 MG/GM ophthalmic ointment Apply to left eye at every bedtime. 3.5 g 3   • ketotifen (Zaditor) 0.025 % ophthalmic solution Instill 1 drop into both eyes 2 times daily as needed for itching. 5 mL 3   • trimethoprim-polymyxin b (Polytrim) 06429-3.1 UNIT/ML-% ophthalmic solution Instill 1 drop into the left eye 4 times Daily. 10  "mL 3     No current facility-administered medications for this visit.     Past Medical History:   Diagnosis Date   • Pre-diabetes      Past Surgical History:   Procedure Laterality Date   • BACK SURGERY  2004    2012   • COLON RESECTION     • HUMERUS FRACTURE SURGERY     • ROTATOR CUFF REPAIR Right 2000   • SKIN CANCER EXCISION  2010    Melanoma resection       Pertinent Review of Systems    Objective   /84 (BP Location: Left arm)   Pulse (!) 48   Ht 177.8 cm (70\")   Wt 81.6 kg (179 lb 12.8 oz)   BMI 25.80 kg/m²    Physical Exam  Left neck 6 mm raised ulcerated lesion of the left neck    Procedures   Procedure Note    Procedure: Excision skin lesion    Location: Left neck    Anesthesia: 1% lidocaine with epinephrine    Description:  The risks and benefits of the procedure were discussed.  After prep with Betadine and infiltration with lidocaine the lesion was excised to grossly clear margins.  Incision was then closed with interrupted 3-0 Vicryl subdermal suture followed by 4-0 nylon sutures.  Dressing was placed.  Incision length was 9 mm    Complications:  none    Follow-up: Pending pathology    Results/Data:      Assessment & Plan   Neoplasm of uncertain behavior left neck    Plan: Track pathology  Remove sutures in 10 days       Discussion/Summary:    Time spent:     BMI is >= 25 and < 30. (Overweight) The following options were offered after discussion: exercise counseling/recommendations         No future appointments.      Please note that portions of this note were completed with a voice recognition program.  "

## 2022-06-03 LAB — REF LAB TEST METHOD: NORMAL

## 2022-07-06 ENCOUNTER — LAB (OUTPATIENT)
Dept: LAB | Facility: HOSPITAL | Age: 82
End: 2022-07-06

## 2022-07-06 ENCOUNTER — TRANSCRIBE ORDERS (OUTPATIENT)
Dept: ADMINISTRATIVE | Facility: HOSPITAL | Age: 82
End: 2022-07-06

## 2022-07-06 DIAGNOSIS — K62.5 HEMORRHAGE OF RECTUM AND ANUS: Primary | ICD-10-CM

## 2022-07-06 DIAGNOSIS — K62.5 HEMORRHAGE OF RECTUM AND ANUS: ICD-10-CM

## 2022-07-06 LAB
ALBUMIN SERPL-MCNC: 4.44 G/DL (ref 3.5–5.2)
ALBUMIN/GLOB SERPL: 1.7 G/DL
ALP SERPL-CCNC: 64 U/L (ref 39–117)
ALT SERPL W P-5'-P-CCNC: 16 U/L (ref 1–41)
ANION GAP SERPL CALCULATED.3IONS-SCNC: 10 MMOL/L (ref 5–15)
AST SERPL-CCNC: 16 U/L (ref 1–40)
BASOPHILS # BLD AUTO: 0.05 10*3/MM3 (ref 0–0.2)
BASOPHILS NFR BLD AUTO: 0.8 % (ref 0–1.5)
BILIRUB SERPL-MCNC: 0.6 MG/DL (ref 0–1.2)
BUN SERPL-MCNC: 21 MG/DL (ref 8–23)
BUN/CREAT SERPL: 26.9 (ref 7–25)
CALCIUM SPEC-SCNC: 9.7 MG/DL (ref 8.6–10.5)
CHLORIDE SERPL-SCNC: 105 MMOL/L (ref 98–107)
CO2 SERPL-SCNC: 26 MMOL/L (ref 22–29)
CREAT SERPL-MCNC: 0.78 MG/DL (ref 0.76–1.27)
DEPRECATED RDW RBC AUTO: 46 FL (ref 37–54)
EGFRCR SERPLBLD CKD-EPI 2021: 89.6 ML/MIN/1.73
EOSINOPHIL # BLD AUTO: 0.29 10*3/MM3 (ref 0–0.4)
EOSINOPHIL NFR BLD AUTO: 4.9 % (ref 0.3–6.2)
ERYTHROCYTE [DISTWIDTH] IN BLOOD BY AUTOMATED COUNT: 13 % (ref 12.3–15.4)
GLOBULIN UR ELPH-MCNC: 2.6 GM/DL
GLUCOSE SERPL-MCNC: 129 MG/DL (ref 65–99)
HCT VFR BLD AUTO: 41.3 % (ref 37.5–51)
HGB BLD-MCNC: 13.3 G/DL (ref 13–17.7)
IMM GRANULOCYTES # BLD AUTO: 0.01 10*3/MM3 (ref 0–0.05)
IMM GRANULOCYTES NFR BLD AUTO: 0.2 % (ref 0–0.5)
LYMPHOCYTES # BLD AUTO: 2.2 10*3/MM3 (ref 0.7–3.1)
LYMPHOCYTES NFR BLD AUTO: 37.2 % (ref 19.6–45.3)
MCH RBC QN AUTO: 30.8 PG (ref 26.6–33)
MCHC RBC AUTO-ENTMCNC: 32.2 G/DL (ref 31.5–35.7)
MCV RBC AUTO: 95.6 FL (ref 79–97)
MONOCYTES # BLD AUTO: 0.59 10*3/MM3 (ref 0.1–0.9)
MONOCYTES NFR BLD AUTO: 10 % (ref 5–12)
NEUTROPHILS NFR BLD AUTO: 2.78 10*3/MM3 (ref 1.7–7)
NEUTROPHILS NFR BLD AUTO: 46.9 % (ref 42.7–76)
NRBC BLD AUTO-RTO: 0 /100 WBC (ref 0–0.2)
PLATELET # BLD AUTO: 279 10*3/MM3 (ref 140–450)
PMV BLD AUTO: 9.6 FL (ref 6–12)
POTASSIUM SERPL-SCNC: 4.3 MMOL/L (ref 3.5–5.2)
PROT SERPL-MCNC: 7 G/DL (ref 6–8.5)
RBC # BLD AUTO: 4.32 10*6/MM3 (ref 4.14–5.8)
RETICS # AUTO: 0.06 10*6/MM3 (ref 0.02–0.13)
RETICS/RBC NFR AUTO: 1.37 % (ref 0.7–1.9)
SODIUM SERPL-SCNC: 141 MMOL/L (ref 136–145)
WBC NRBC COR # BLD: 5.92 10*3/MM3 (ref 3.4–10.8)

## 2022-07-06 PROCEDURE — 36415 COLL VENOUS BLD VENIPUNCTURE: CPT

## 2022-07-06 PROCEDURE — 85025 COMPLETE CBC W/AUTO DIFF WBC: CPT

## 2022-07-06 PROCEDURE — 85045 AUTOMATED RETICULOCYTE COUNT: CPT

## 2022-07-06 PROCEDURE — 80053 COMPREHEN METABOLIC PANEL: CPT

## 2022-07-11 RX ORDER — FLECAINIDE ACETATE 100 MG/1
100 TABLET ORAL 2 TIMES DAILY
Qty: 60 TABLET | Refills: 5 | Status: SHIPPED | OUTPATIENT
Start: 2022-07-11 | End: 2023-01-20 | Stop reason: SDUPTHER

## 2022-11-30 ENCOUNTER — TRANSCRIBE ORDERS (OUTPATIENT)
Dept: ADMINISTRATIVE | Facility: HOSPITAL | Age: 82
End: 2022-11-30

## 2022-11-30 DIAGNOSIS — E11.9 DIABETES MELLITUS WITHOUT COMPLICATION: ICD-10-CM

## 2022-11-30 DIAGNOSIS — E78.2 MIXED HYPERLIPIDEMIA: ICD-10-CM

## 2022-11-30 DIAGNOSIS — E55.9 VITAMIN D DEFICIENCY, UNSPECIFIED: ICD-10-CM

## 2022-11-30 DIAGNOSIS — D50.9 IRON DEFICIENCY ANEMIA, UNSPECIFIED IRON DEFICIENCY ANEMIA TYPE: Primary | ICD-10-CM

## 2022-11-30 DIAGNOSIS — D64.9 ANEMIA, UNSPECIFIED TYPE: ICD-10-CM

## 2022-12-01 ENCOUNTER — LAB (OUTPATIENT)
Dept: LAB | Facility: HOSPITAL | Age: 82
End: 2022-12-01

## 2022-12-01 DIAGNOSIS — D64.9 ANEMIA, UNSPECIFIED TYPE: ICD-10-CM

## 2022-12-01 DIAGNOSIS — E11.9 DIABETES MELLITUS WITHOUT COMPLICATION: ICD-10-CM

## 2022-12-01 DIAGNOSIS — E78.2 MIXED HYPERLIPIDEMIA: ICD-10-CM

## 2022-12-01 DIAGNOSIS — D50.9 IRON DEFICIENCY ANEMIA, UNSPECIFIED IRON DEFICIENCY ANEMIA TYPE: ICD-10-CM

## 2022-12-01 DIAGNOSIS — E55.9 VITAMIN D DEFICIENCY, UNSPECIFIED: ICD-10-CM

## 2022-12-01 LAB
25(OH)D3 SERPL-MCNC: 28.6 NG/ML (ref 30–100)
ALBUMIN SERPL-MCNC: 4.5 G/DL (ref 3.5–5.2)
ALBUMIN/GLOB SERPL: 2.1 G/DL
ALP SERPL-CCNC: 63 U/L (ref 39–117)
ALT SERPL W P-5'-P-CCNC: 15 U/L (ref 1–41)
ANION GAP SERPL CALCULATED.3IONS-SCNC: 7 MMOL/L (ref 5–15)
AST SERPL-CCNC: 16 U/L (ref 1–40)
BASOPHILS # BLD AUTO: 0.07 10*3/MM3 (ref 0–0.2)
BASOPHILS NFR BLD AUTO: 1.1 % (ref 0–1.5)
BILIRUB SERPL-MCNC: 0.4 MG/DL (ref 0–1.2)
BUN SERPL-MCNC: 21 MG/DL (ref 8–23)
BUN/CREAT SERPL: 28 (ref 7–25)
CALCIUM SPEC-SCNC: 9.7 MG/DL (ref 8.6–10.5)
CHLORIDE SERPL-SCNC: 105 MMOL/L (ref 98–107)
CHOLEST SERPL-MCNC: 175 MG/DL (ref 0–200)
CO2 SERPL-SCNC: 28 MMOL/L (ref 22–29)
CREAT SERPL-MCNC: 0.75 MG/DL (ref 0.76–1.27)
DEPRECATED RDW RBC AUTO: 45.9 FL (ref 37–54)
EGFRCR SERPLBLD CKD-EPI 2021: 90.7 ML/MIN/1.73
EOSINOPHIL # BLD AUTO: 0.35 10*3/MM3 (ref 0–0.4)
EOSINOPHIL NFR BLD AUTO: 5.6 % (ref 0.3–6.2)
ERYTHROCYTE [DISTWIDTH] IN BLOOD BY AUTOMATED COUNT: 12.8 % (ref 12.3–15.4)
FERRITIN SERPL-MCNC: 386 NG/ML (ref 30–400)
FOLATE SERPL-MCNC: >20 NG/ML (ref 4.78–24.2)
GLOBULIN UR ELPH-MCNC: 2.1 GM/DL
GLUCOSE SERPL-MCNC: 109 MG/DL (ref 65–99)
HBA1C MFR BLD: 6.1 % (ref 4.8–5.6)
HCT VFR BLD AUTO: 42.1 % (ref 37.5–51)
HDLC SERPL-MCNC: 64 MG/DL (ref 40–60)
HGB BLD-MCNC: 13.4 G/DL (ref 13–17.7)
IMM GRANULOCYTES # BLD AUTO: 0.02 10*3/MM3 (ref 0–0.05)
IMM GRANULOCYTES NFR BLD AUTO: 0.3 % (ref 0–0.5)
LDLC SERPL CALC-MCNC: 98 MG/DL (ref 0–100)
LDLC/HDLC SERPL: 1.52 {RATIO}
LYMPHOCYTES # BLD AUTO: 2.7 10*3/MM3 (ref 0.7–3.1)
LYMPHOCYTES NFR BLD AUTO: 43.2 % (ref 19.6–45.3)
MCH RBC QN AUTO: 31 PG (ref 26.6–33)
MCHC RBC AUTO-ENTMCNC: 31.8 G/DL (ref 31.5–35.7)
MCV RBC AUTO: 97.5 FL (ref 79–97)
MONOCYTES # BLD AUTO: 0.62 10*3/MM3 (ref 0.1–0.9)
MONOCYTES NFR BLD AUTO: 9.9 % (ref 5–12)
NEUTROPHILS NFR BLD AUTO: 2.49 10*3/MM3 (ref 1.7–7)
NEUTROPHILS NFR BLD AUTO: 39.9 % (ref 42.7–76)
NRBC BLD AUTO-RTO: 0 /100 WBC (ref 0–0.2)
PLATELET # BLD AUTO: 303 10*3/MM3 (ref 140–450)
PMV BLD AUTO: 9.9 FL (ref 6–12)
POTASSIUM SERPL-SCNC: 5 MMOL/L (ref 3.5–5.2)
PROT SERPL-MCNC: 6.6 G/DL (ref 6–8.5)
RBC # BLD AUTO: 4.32 10*6/MM3 (ref 4.14–5.8)
SODIUM SERPL-SCNC: 140 MMOL/L (ref 136–145)
TRIGL SERPL-MCNC: 70 MG/DL (ref 0–150)
URATE SERPL-MCNC: 4.2 MG/DL (ref 3.4–7)
VIT B12 BLD-MCNC: 457 PG/ML (ref 211–946)
VLDLC SERPL-MCNC: 13 MG/DL (ref 5–40)
WBC NRBC COR # BLD: 6.25 10*3/MM3 (ref 3.4–10.8)

## 2022-12-01 PROCEDURE — 80061 LIPID PANEL: CPT

## 2022-12-01 PROCEDURE — 80053 COMPREHEN METABOLIC PANEL: CPT

## 2022-12-01 PROCEDURE — 82306 VITAMIN D 25 HYDROXY: CPT

## 2022-12-01 PROCEDURE — 36415 COLL VENOUS BLD VENIPUNCTURE: CPT

## 2022-12-01 PROCEDURE — 84154 ASSAY OF PSA FREE: CPT

## 2022-12-01 PROCEDURE — 84550 ASSAY OF BLOOD/URIC ACID: CPT

## 2022-12-01 PROCEDURE — 82728 ASSAY OF FERRITIN: CPT

## 2022-12-01 PROCEDURE — 85025 COMPLETE CBC W/AUTO DIFF WBC: CPT

## 2022-12-01 PROCEDURE — 82607 VITAMIN B-12: CPT

## 2022-12-01 PROCEDURE — 84153 ASSAY OF PSA TOTAL: CPT

## 2022-12-01 PROCEDURE — 83036 HEMOGLOBIN GLYCOSYLATED A1C: CPT

## 2022-12-01 PROCEDURE — 82746 ASSAY OF FOLIC ACID SERUM: CPT

## 2022-12-02 LAB
PSA FREE MFR SERPL: 20.5 %
PSA FREE SERPL-MCNC: 0.88 NG/ML
PSA SERPL-MCNC: 4.3 NG/ML (ref 0–4)
REFLEX: ABNORMAL

## 2022-12-05 DIAGNOSIS — R97.20 ELEVATED PROSTATE SPECIFIC ANTIGEN (PSA): Primary | ICD-10-CM

## 2022-12-08 RX ORDER — BALOXAVIR MARBOXIL 80 MG/1
1 TABLET, FILM COATED ORAL ONCE
Qty: 1 EACH | Refills: 0 | Status: SHIPPED | OUTPATIENT
Start: 2022-12-08 | End: 2022-12-09

## 2023-01-12 RX ORDER — FLECAINIDE ACETATE 100 MG/1
100 TABLET ORAL 2 TIMES DAILY
Qty: 60 TABLET | Refills: 5 | OUTPATIENT
Start: 2023-01-12

## 2023-01-20 DIAGNOSIS — I48.0 PAROXYSMAL ATRIAL FIBRILLATION: Primary | ICD-10-CM

## 2023-01-20 RX ORDER — FLECAINIDE ACETATE 100 MG/1
100 TABLET ORAL 2 TIMES DAILY
Qty: 180 TABLET | Refills: 1 | Status: SHIPPED | OUTPATIENT
Start: 2023-01-20

## 2023-01-26 ENCOUNTER — HOSPITAL ENCOUNTER (OUTPATIENT)
Dept: RESPIRATORY THERAPY | Facility: HOSPITAL | Age: 83
Discharge: HOME | End: 2023-01-26
Admitting: INTERNAL MEDICINE
Payer: MEDICARE

## 2023-01-26 DIAGNOSIS — I48.0 PAROXYSMAL ATRIAL FIBRILLATION: ICD-10-CM

## 2023-01-26 PROCEDURE — 93005 ELECTROCARDIOGRAM TRACING: CPT | Performed by: INTERNAL MEDICINE

## 2023-01-26 PROCEDURE — 93010 ELECTROCARDIOGRAM REPORT: CPT | Performed by: INTERNAL MEDICINE

## 2023-01-29 LAB
QT INTERVAL: 540 MS
QTC INTERVAL: 477 MS

## 2023-02-10 ENCOUNTER — TELEPHONE (OUTPATIENT)
Dept: CARDIOLOGY | Facility: CLINIC | Age: 83
End: 2023-02-10

## 2023-02-10 NOTE — TELEPHONE ENCOUNTER
HUB CAN READ    No provider in office to discuss this at this time.  He can call back Monday and speak to someone.

## 2023-02-10 NOTE — TELEPHONE ENCOUNTER
Caller: Henry Olea    Relationship: Self    Best call back number: [phone]    What is the best time to reach you: ANY    Who are you requesting to speak with (clinical staff, provider,  specific staff member): CLINICAL      What was the call regarding: PT HAD AN EKG AND WAS WONDERING IF HE COULD GET AN APPT OR SPEAK TO SOMEONE ABOUT IT. HE HAD THE EKG DONE AT [facility]. PLEASE REACH OUT TO PT ABOUT THIS. THANK YOU.     Do you require a callback: YES

## 2023-02-13 ENCOUNTER — LAB (OUTPATIENT)
Dept: LAB | Facility: HOSPITAL | Age: 83
End: 2023-02-13
Payer: MEDICARE

## 2023-02-13 DIAGNOSIS — D48.5 NEOPLASM OF UNCERTAIN BEHAVIOR OF SKIN: ICD-10-CM

## 2023-02-13 DIAGNOSIS — D48.5 NEOPLASM OF UNCERTAIN BEHAVIOR OF SKIN: Primary | ICD-10-CM

## 2023-02-13 DIAGNOSIS — R97.20 ELEVATED PROSTATE SPECIFIC ANTIGEN (PSA): ICD-10-CM

## 2023-02-13 PROCEDURE — 36415 COLL VENOUS BLD VENIPUNCTURE: CPT

## 2023-02-13 PROCEDURE — 84153 ASSAY OF PSA TOTAL: CPT

## 2023-02-14 LAB — PSA SERPL-MCNC: 4.41 NG/ML (ref 0–4)

## 2023-02-20 ENCOUNTER — OFFICE VISIT (OUTPATIENT)
Dept: UROLOGY | Facility: CLINIC | Age: 83
End: 2023-02-20
Payer: MEDICARE

## 2023-02-20 VITALS — HEIGHT: 70 IN | WEIGHT: 179 LBS | BODY MASS INDEX: 25.62 KG/M2

## 2023-02-20 DIAGNOSIS — N40.1 BENIGN PROSTATIC HYPERPLASIA WITH URINARY HESITANCY: ICD-10-CM

## 2023-02-20 DIAGNOSIS — D48.5 NEOPLASM OF UNCERTAIN BEHAVIOR OF SKIN: Primary | ICD-10-CM

## 2023-02-20 DIAGNOSIS — R39.11 BENIGN PROSTATIC HYPERPLASIA WITH URINARY HESITANCY: ICD-10-CM

## 2023-02-20 PROCEDURE — 99203 OFFICE O/P NEW LOW 30 MIN: CPT | Performed by: UROLOGY

## 2023-02-20 RX ORDER — FINASTERIDE 5 MG/1
5 TABLET, FILM COATED ORAL DAILY
Qty: 30 TABLET | Refills: 5 | Status: SHIPPED | OUTPATIENT
Start: 2023-02-20

## 2023-02-20 NOTE — PROGRESS NOTES
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  He is up twice at night no fevers and chills he is atrial fibrillation is now normal sinus rhythm he has had a colon surgery for diverticular disease.  Exam shows a 90 g prostate I Karlie make the addition of finasteride he has a known elevated PSA slightly off the baseline of 1 over the last several years.  I will see him back in 3 months.    Past Medical History:     Past Medical History:   Diagnosis Date   • Pre-diabetes        Current Meds:     Current Outpatient Medications   Medication Sig Dispense Refill   • acetaminophen-codeine (TYLENOL #3) 300-30 MG per tablet Take 1 tablet by mouth Every 6 (Six) Hours As Needed. 12 tablet 0   • amoxicillin (AMOXIL) 500 MG capsule Take 1 capsule by mouth 2 times Daily for 10 days. 20 capsule 0   • apixaban (ELIQUIS) 5 MG tablet tablet Take 1 tablet by mouth Every 12 (Twelve) Hours. 60 tablet 3   • celecoxib (CeleBREX) 200 MG capsule Take 1 capsule by mouth 2 (Two) Times a Day As Needed. 60 capsule 5   • dilTIAZem CD (CARDIZEM CD) 120 MG 24 hr capsule Take 1 capsule by mouth Daily. 30 capsule 11   • erythromycin (ROMYCIN) 5 MG/GM ophthalmic ointment Apply to left eye at every bedtime. 3.5 g 3   • famotidine (PEPCID) 20 MG tablet Take 1 tablet by mouth Every Night at bedtime 30 tablet 12   • flecainide (TAMBOCOR) 100 MG tablet Take 1 tablet by mouth 2 (Two) Times a Day. 180 tablet 1   • ibuprofen (ADVIL,MOTRIN) 600 MG tablet Take 1 tablet by mouth Every 6 (Six) Hours As Needed. 30 tablet 0   • ketotifen (Zaditor) 0.025 % ophthalmic solution Instill 1 drop into both eyes 2 times daily as needed for itching. 5 mL 3   •  mesalamine (CANASA) 1000 MG suppository USE 1 SUPPOISTORY RECTALLY AS DIRECTED AT NIGHT. 30 suppository 5   • Omega-3 Fatty Acids (fish oil) 1000 MG capsule capsule Take  by mouth Daily With Breakfast.     • pantoprazole (Protonix) 40 MG EC tablet Take 1 tablet by mouth Daily. 90 tablet 4   • polyethylene glycol-electrolytes (GaviLyte-N with Flavor Pack) 420 g solution Take 8 Ounce(s) by mouth 16 times a day 4000 mL 0   • pregabalin (Lyrica) 100 MG capsule Take 1 capsule by mouth 2 times every day 10 capsule 1   • trimethoprim-polymyxin b (Polytrim) 35474-1.1 UNIT/ML-% ophthalmic solution Instill 1 drop into the left eye 4 times Daily. 10 mL 3   • vitamin C (ASCORBIC ACID) 250 MG tablet Take 250 mg by mouth Daily.     • Zoster Vac Recomb Adjuvanted (Shingrix) 50 MCG/0.5ML reconstituted suspension Inject 0.5 ml into the appropriate muscle as directed by prescriber. 0.5 mL 0     No current facility-administered medications for this visit.        Allergies:      No Known Allergies     Past Surgical History:     Past Surgical History:   Procedure Laterality Date   • BACK SURGERY  2004   • COLON RESECTION     • HUMERUS FRACTURE SURGERY     • ROTATOR CUFF REPAIR Right 2000   • SKIN CANCER EXCISION  2010    Melanoma resection       Social History:     Social History     Socioeconomic History   • Marital status:    Tobacco Use   • Smoking status: Former     Types: Cigarettes     Quit date:      Years since quittin.1   • Smokeless tobacco: Never   Vaping Use   • Vaping Use: Never used   Substance and Sexual Activity   • Alcohol use: Yes     Comment: 3 oz    • Drug use: No   • Sexual activity: Defer       Family History:     Family History   Problem Relation Age of Onset   • Diverticulitis Mother    • Rheum arthritis Mother    • Diabetes Mother    • Lung cancer Father    • Heart disease Father    • Heart disease Brother        Review of Systems:     Review of Systems   Constitutional: Negative.     HENT: Negative.    Eyes: Negative.    Respiratory: Negative.    Cardiovascular: Negative.    Gastrointestinal: Negative.    Endocrine: Negative.    Musculoskeletal: Negative.    Allergic/Immunologic: Negative.    Neurological: Negative.    Hematological: Negative.    Psychiatric/Behavioral: Negative.        Physical Exam:     Physical Exam  Vitals and nursing note reviewed.   Constitutional:       Appearance: He is well-developed.   HENT:      Head: Normocephalic and atraumatic.   Eyes:      Conjunctiva/sclera: Conjunctivae normal.      Pupils: Pupils are equal, round, and reactive to light.   Cardiovascular:      Rate and Rhythm: Normal rate and regular rhythm.      Heart sounds: Normal heart sounds.   Pulmonary:      Effort: Pulmonary effort is normal.      Breath sounds: Normal breath sounds.   Abdominal:      General: Bowel sounds are normal.      Palpations: Abdomen is soft.   Genitourinary:     Prostate: Normal.      Rectum: Normal.      Comments: Good rectal tone 90 g prostate to palpation  Musculoskeletal:         General: Normal range of motion.      Cervical back: Normal range of motion.   Skin:     General: Skin is warm and dry.   Neurological:      Mental Status: He is alert and oriented to person, place, and time.      Deep Tendon Reflexes: Reflexes are normal and symmetric.   Psychiatric:         Behavior: Behavior normal.         Thought Content: Thought content normal.         Judgment: Judgment normal.         I have reviewed the following portions of the patient's history: Allergies, current medications, past family history, past medical history, past social history, past surgical history, problem list, and ROS and confirm it is accurate.    Recent Image (CT and/or KUB):      CT Abdomen and Pelvis: No results found for this or any previous visit.       CT Stone Protocol: No results found for this or any previous visit.       KUB: No results found for this or any previous visit.       Labs (past 3  months):      Lab on 02/13/2023   Component Date Value Ref Range Status   • PSA 02/13/2023 4.410 (H)  0.000 - 4.000 ng/mL Final   Hospital Outpatient Visit on 01/26/2023   Component Date Value Ref Range Status   • QT Interval 01/26/2023 540  ms Final   • QTC Interval 01/26/2023 477  ms Final   Lab on 12/01/2022   Component Date Value Ref Range Status   • Vitamin B-12 12/01/2022 457  211 - 946 pg/mL Final   • Folate 12/01/2022 >20.00  4.78 - 24.20 ng/mL Final   • Uric Acid 12/01/2022 4.2  3.4 - 7.0 mg/dL Final   • Hemoglobin A1C 12/01/2022 6.10 (H)  4.80 - 5.60 % Final   • Ferritin 12/01/2022 386.00  30.00 - 400.00 ng/mL Final   • 25 Hydroxy, Vitamin D 12/01/2022 28.6 (L)  30.0 - 100.0 ng/ml Final   • Total Cholesterol 12/01/2022 175  0 - 200 mg/dL Final   • Triglycerides 12/01/2022 70  0 - 150 mg/dL Final   • HDL Cholesterol 12/01/2022 64 (H)  40 - 60 mg/dL Final   • LDL Cholesterol  12/01/2022 98  0 - 100 mg/dL Final   • VLDL Cholesterol 12/01/2022 13  5 - 40 mg/dL Final   • LDL/HDL Ratio 12/01/2022 1.52   Final   • Glucose 12/01/2022 109 (H)  65 - 99 mg/dL Final   • BUN 12/01/2022 21  8 - 23 mg/dL Final   • Creatinine 12/01/2022 0.75 (L)  0.76 - 1.27 mg/dL Final   • Sodium 12/01/2022 140  136 - 145 mmol/L Final   • Potassium 12/01/2022 5.0  3.5 - 5.2 mmol/L Final   • Chloride 12/01/2022 105  98 - 107 mmol/L Final   • CO2 12/01/2022 28.0  22.0 - 29.0 mmol/L Final   • Calcium 12/01/2022 9.7  8.6 - 10.5 mg/dL Final   • Total Protein 12/01/2022 6.6  6.0 - 8.5 g/dL Final   • Albumin 12/01/2022 4.50  3.50 - 5.20 g/dL Final   • ALT (SGPT) 12/01/2022 15  1 - 41 U/L Final   • AST (SGOT) 12/01/2022 16  1 - 40 U/L Final   • Alkaline Phosphatase 12/01/2022 63  39 - 117 U/L Final   • Total Bilirubin 12/01/2022 0.4  0.0 - 1.2 mg/dL Final   • Globulin 12/01/2022 2.1  gm/dL Final   • A/G Ratio 12/01/2022 2.1  g/dL Final   • BUN/Creatinine Ratio 12/01/2022 28.0 (H)  7.0 - 25.0 Final   • Anion Gap 12/01/2022 7.0  5.0 - 15.0 mmol/L  Final   • eGFR 12/01/2022 90.7  >60.0 mL/min/1.73 Final    National Kidney Foundation and American Society of Nephrology (ASN) Task Force recommended calculation based on the Chronic Kidney Disease Epidemiology Collaboration (CKD-EPI) equation refit without adjustment for race.   • PSA 12/01/2022 4.3 (H)  0.0 - 4.0 ng/mL Final    Roche ECLIA methodology.  According to the American Urological Association, Serum PSA should  decrease and remain at undetectable levels after radical  prostatectomy. The AUA defines biochemical recurrence as an initial  PSA value 0.2 ng/mL or greater followed by a subsequent confirmatory  PSA value 0.2 ng/mL or greater.  Values obtained with different assay methods or kits cannot be used  interchangeably. Results cannot be interpreted as absolute evidence  of the presence or absence of malignant disease.   • Reflex 12/01/2022 Comment   Final    The percent free PSA is performed on a reflex basis only when the  total PSA is between 4.0 and 10.0 ng/mL.   • WBC 12/01/2022 6.25  3.40 - 10.80 10*3/mm3 Final   • RBC 12/01/2022 4.32  4.14 - 5.80 10*6/mm3 Final   • Hemoglobin 12/01/2022 13.4  13.0 - 17.7 g/dL Final   • Hematocrit 12/01/2022 42.1  37.5 - 51.0 % Final   • MCV 12/01/2022 97.5 (H)  79.0 - 97.0 fL Final   • MCH 12/01/2022 31.0  26.6 - 33.0 pg Final   • MCHC 12/01/2022 31.8  31.5 - 35.7 g/dL Final   • RDW 12/01/2022 12.8  12.3 - 15.4 % Final   • RDW-SD 12/01/2022 45.9  37.0 - 54.0 fl Final   • MPV 12/01/2022 9.9  6.0 - 12.0 fL Final   • Platelets 12/01/2022 303  140 - 450 10*3/mm3 Final   • Neutrophil % 12/01/2022 39.9 (L)  42.7 - 76.0 % Final   • Lymphocyte % 12/01/2022 43.2  19.6 - 45.3 % Final   • Monocyte % 12/01/2022 9.9  5.0 - 12.0 % Final   • Eosinophil % 12/01/2022 5.6  0.3 - 6.2 % Final   • Basophil % 12/01/2022 1.1  0.0 - 1.5 % Final   • Immature Grans % 12/01/2022 0.3  0.0 - 0.5 % Final   • Neutrophils, Absolute 12/01/2022 2.49  1.70 - 7.00 10*3/mm3 Final   • Lymphocytes,  Absolute 12/01/2022 2.70  0.70 - 3.10 10*3/mm3 Final   • Monocytes, Absolute 12/01/2022 0.62  0.10 - 0.90 10*3/mm3 Final   • Eosinophils, Absolute 12/01/2022 0.35  0.00 - 0.40 10*3/mm3 Final   • Basophils, Absolute 12/01/2022 0.07  0.00 - 0.20 10*3/mm3 Final   • Immature Grans, Absolute 12/01/2022 0.02  0.00 - 0.05 10*3/mm3 Final   • nRBC 12/01/2022 0.0  0.0 - 0.2 /100 WBC Final   • PSA, Free 12/01/2022 0.88  N/A ng/mL Final    Roche ECLIA methodology.   • PSA, Free % 12/01/2022 20.5  % Final    The table below lists the probability of prostate cancer for  men with non-suspicious MARSHALL results and total PSA between  4 and 10 ng/mL, by patient age (Ramirez et al, SUSIE 1998,  279:1542).                    % Free PSA       50-64 yr        65-75 yr                    0.00-10.00%        56%             55%                   10.01-15.00%        24%             35%                   15.01-20.00%        17%             23%                   20.01-25.00%        10%             20%                        >25.00%         5%              9%  Please note:  Ramirez et al did not make specific                recommendations regarding the use of                percent free PSA for any other population                of men.        Procedure:       Assessment/Plan:   BPH: Discussed the pathophysiology of BPH and obstruction.  We discussed the static and dynamic effects of BPH as well as using 5 alpha reductase inhibitors versus alpha blockade.  We discussed the indications for transurethral surgery as well and/ or other therapeutic options available including all of the newer techniques.  He has a very generous prostate I would recommend finasteride as a primary therapy before intervening with more aggressive maneuvers for diagnosis of prostate cancer              This document has been electronically signed by CHIKA MINOR MD February 20, 2023 13:46 EST    Dictated Utilizing Dragon Dictation: Part of this note may be an  electronic transcription/translation of spoken language to printed text using the Dragon Dictation System.

## 2023-02-23 PROBLEM — N40.1 BENIGN PROSTATIC HYPERPLASIA WITH URINARY HESITANCY: Status: ACTIVE | Noted: 2023-02-23

## 2023-02-23 PROBLEM — R39.11 BENIGN PROSTATIC HYPERPLASIA WITH URINARY HESITANCY: Status: ACTIVE | Noted: 2023-02-23

## 2023-04-24 DIAGNOSIS — R39.11 BENIGN PROSTATIC HYPERPLASIA WITH URINARY HESITANCY: Primary | ICD-10-CM

## 2023-04-24 DIAGNOSIS — N40.1 BENIGN PROSTATIC HYPERPLASIA WITH URINARY HESITANCY: Primary | ICD-10-CM

## 2023-05-17 ENCOUNTER — TRANSCRIBE ORDERS (OUTPATIENT)
Dept: ADMINISTRATIVE | Facility: HOSPITAL | Age: 83
End: 2023-05-17
Payer: MEDICARE

## 2023-05-17 ENCOUNTER — LAB (OUTPATIENT)
Dept: LAB | Facility: HOSPITAL | Age: 83
End: 2023-05-17
Payer: MEDICARE

## 2023-05-17 DIAGNOSIS — E78.2 MIXED HYPERLIPIDEMIA: ICD-10-CM

## 2023-05-17 DIAGNOSIS — D50.9 IRON DEFICIENCY ANEMIA, UNSPECIFIED IRON DEFICIENCY ANEMIA TYPE: Primary | ICD-10-CM

## 2023-05-17 DIAGNOSIS — D64.9 ANEMIA, UNSPECIFIED TYPE: ICD-10-CM

## 2023-05-17 DIAGNOSIS — E11.9 DIABETES MELLITUS WITHOUT COMPLICATION: ICD-10-CM

## 2023-05-17 DIAGNOSIS — D50.9 IRON DEFICIENCY ANEMIA, UNSPECIFIED IRON DEFICIENCY ANEMIA TYPE: ICD-10-CM

## 2023-05-17 DIAGNOSIS — E55.9 VITAMIN D DEFICIENCY: ICD-10-CM

## 2023-05-17 LAB
25(OH)D3 SERPL-MCNC: 30.4 NG/ML (ref 30–100)
ALBUMIN SERPL-MCNC: 4.2 G/DL (ref 3.5–5.2)
ALBUMIN/GLOB SERPL: 1.6 G/DL
ALP SERPL-CCNC: 64 U/L (ref 39–117)
ALT SERPL W P-5'-P-CCNC: 18 U/L (ref 1–41)
ANION GAP SERPL CALCULATED.3IONS-SCNC: 8.4 MMOL/L (ref 5–15)
AST SERPL-CCNC: 15 U/L (ref 1–40)
BASOPHILS # BLD AUTO: 0.07 10*3/MM3 (ref 0–0.2)
BASOPHILS NFR BLD AUTO: 1.2 % (ref 0–1.5)
BILIRUB SERPL-MCNC: 0.3 MG/DL (ref 0–1.2)
BUN SERPL-MCNC: 21 MG/DL (ref 8–23)
BUN/CREAT SERPL: 27.3 (ref 7–25)
CALCIUM SPEC-SCNC: 9.8 MG/DL (ref 8.6–10.5)
CHLORIDE SERPL-SCNC: 106 MMOL/L (ref 98–107)
CHOLEST SERPL-MCNC: 171 MG/DL (ref 0–200)
CO2 SERPL-SCNC: 26.6 MMOL/L (ref 22–29)
CREAT SERPL-MCNC: 0.77 MG/DL (ref 0.76–1.27)
CRP SERPL-MCNC: <0.3 MG/DL (ref 0–0.5)
DEPRECATED RDW RBC AUTO: 44.2 FL (ref 37–54)
EGFRCR SERPLBLD CKD-EPI 2021: 89.4 ML/MIN/1.73
EOSINOPHIL # BLD AUTO: 0.35 10*3/MM3 (ref 0–0.4)
EOSINOPHIL NFR BLD AUTO: 6.1 % (ref 0.3–6.2)
ERYTHROCYTE [DISTWIDTH] IN BLOOD BY AUTOMATED COUNT: 12.9 % (ref 12.3–15.4)
FERRITIN SERPL-MCNC: 432 NG/ML (ref 30–400)
FOLATE SERPL-MCNC: >20 NG/ML (ref 4.78–24.2)
GLOBULIN UR ELPH-MCNC: 2.6 GM/DL
GLUCOSE SERPL-MCNC: 119 MG/DL (ref 65–99)
HBA1C MFR BLD: 5.9 % (ref 4.8–5.6)
HCT VFR BLD AUTO: 40.8 % (ref 37.5–51)
HDLC SERPL-MCNC: 73 MG/DL (ref 40–60)
HGB BLD-MCNC: 13.4 G/DL (ref 13–17.7)
IMM GRANULOCYTES # BLD AUTO: 0.01 10*3/MM3 (ref 0–0.05)
IMM GRANULOCYTES NFR BLD AUTO: 0.2 % (ref 0–0.5)
IRON 24H UR-MRATE: 52 MCG/DL (ref 59–158)
IRON SATN MFR SERPL: 14 % (ref 20–50)
LDLC SERPL CALC-MCNC: 86 MG/DL (ref 0–100)
LDLC/HDLC SERPL: 1.18 {RATIO}
LYMPHOCYTES # BLD AUTO: 2.4 10*3/MM3 (ref 0.7–3.1)
LYMPHOCYTES NFR BLD AUTO: 42 % (ref 19.6–45.3)
MAGNESIUM SERPL-MCNC: 2.4 MG/DL (ref 1.6–2.4)
MCH RBC QN AUTO: 31.3 PG (ref 26.6–33)
MCHC RBC AUTO-ENTMCNC: 32.8 G/DL (ref 31.5–35.7)
MCV RBC AUTO: 95.3 FL (ref 79–97)
MONOCYTES # BLD AUTO: 0.56 10*3/MM3 (ref 0.1–0.9)
MONOCYTES NFR BLD AUTO: 9.8 % (ref 5–12)
NEUTROPHILS NFR BLD AUTO: 2.32 10*3/MM3 (ref 1.7–7)
NEUTROPHILS NFR BLD AUTO: 40.7 % (ref 42.7–76)
NRBC BLD AUTO-RTO: 0 /100 WBC (ref 0–0.2)
PLATELET # BLD AUTO: 289 10*3/MM3 (ref 140–450)
PMV BLD AUTO: 9.7 FL (ref 6–12)
POTASSIUM SERPL-SCNC: 5 MMOL/L (ref 3.5–5.2)
PROT SERPL-MCNC: 6.8 G/DL (ref 6–8.5)
RBC # BLD AUTO: 4.28 10*6/MM3 (ref 4.14–5.8)
RETICS # AUTO: 0.06 10*6/MM3 (ref 0.02–0.13)
RETICS/RBC NFR AUTO: 1.43 % (ref 0.7–1.9)
SODIUM SERPL-SCNC: 141 MMOL/L (ref 136–145)
T4 FREE SERPL-MCNC: 1.1 NG/DL (ref 0.93–1.7)
TIBC SERPL-MCNC: 384 MCG/DL (ref 298–536)
TRANSFERRIN SERPL-MCNC: 258 MG/DL (ref 200–360)
TRIGL SERPL-MCNC: 61 MG/DL (ref 0–150)
TSH SERPL DL<=0.05 MIU/L-ACNC: 4.32 UIU/ML (ref 0.27–4.2)
URATE SERPL-MCNC: 4 MG/DL (ref 3.4–7)
VIT B12 BLD-MCNC: 484 PG/ML (ref 211–946)
VLDLC SERPL-MCNC: 12 MG/DL (ref 5–40)
WBC NRBC COR # BLD: 5.71 10*3/MM3 (ref 3.4–10.8)

## 2023-05-17 PROCEDURE — 84466 ASSAY OF TRANSFERRIN: CPT

## 2023-05-17 PROCEDURE — 85045 AUTOMATED RETICULOCYTE COUNT: CPT

## 2023-05-17 PROCEDURE — 82728 ASSAY OF FERRITIN: CPT

## 2023-05-17 PROCEDURE — 82746 ASSAY OF FOLIC ACID SERUM: CPT

## 2023-05-17 PROCEDURE — 82306 VITAMIN D 25 HYDROXY: CPT

## 2023-05-17 PROCEDURE — 84439 ASSAY OF FREE THYROXINE: CPT

## 2023-05-17 PROCEDURE — 80053 COMPREHEN METABOLIC PANEL: CPT

## 2023-05-17 PROCEDURE — 84550 ASSAY OF BLOOD/URIC ACID: CPT

## 2023-05-17 PROCEDURE — 86140 C-REACTIVE PROTEIN: CPT

## 2023-05-17 PROCEDURE — 84153 ASSAY OF PSA TOTAL: CPT

## 2023-05-17 PROCEDURE — 83735 ASSAY OF MAGNESIUM: CPT

## 2023-05-17 PROCEDURE — 82607 VITAMIN B-12: CPT

## 2023-05-17 PROCEDURE — 36415 COLL VENOUS BLD VENIPUNCTURE: CPT

## 2023-05-17 PROCEDURE — 85025 COMPLETE CBC W/AUTO DIFF WBC: CPT

## 2023-05-17 PROCEDURE — 84443 ASSAY THYROID STIM HORMONE: CPT

## 2023-05-17 PROCEDURE — 80061 LIPID PANEL: CPT

## 2023-05-17 PROCEDURE — 83036 HEMOGLOBIN GLYCOSYLATED A1C: CPT

## 2023-05-17 PROCEDURE — 83540 ASSAY OF IRON: CPT

## 2023-05-18 LAB
PSA SERPL-MCNC: 3.7 NG/ML (ref 0–4)
REFLEX: NORMAL

## 2023-05-22 ENCOUNTER — OFFICE VISIT (OUTPATIENT)
Dept: UROLOGY | Facility: CLINIC | Age: 83
End: 2023-05-22

## 2023-05-22 VITALS
WEIGHT: 175 LBS | HEART RATE: 50 BPM | BODY MASS INDEX: 25.05 KG/M2 | DIASTOLIC BLOOD PRESSURE: 77 MMHG | SYSTOLIC BLOOD PRESSURE: 155 MMHG | HEIGHT: 70 IN

## 2023-05-22 DIAGNOSIS — N40.1 BENIGN PROSTATIC HYPERPLASIA WITH URINARY HESITANCY: Primary | ICD-10-CM

## 2023-05-22 DIAGNOSIS — R39.11 BENIGN PROSTATIC HYPERPLASIA WITH URINARY HESITANCY: Primary | ICD-10-CM

## 2023-05-22 PROCEDURE — 99213 OFFICE O/P EST LOW 20 MIN: CPT | Performed by: UROLOGY

## 2023-05-22 PROCEDURE — 1159F MED LIST DOCD IN RCRD: CPT | Performed by: UROLOGY

## 2023-05-22 PROCEDURE — 1160F RVW MEDS BY RX/DR IN RCRD: CPT | Performed by: UROLOGY

## 2023-05-22 NOTE — PROGRESS NOTES
Chief Complaint:      Chief Complaint   Patient presents with   • Elevated PSA       HPI:   82 y.o. male turns today with a history of PSA is dropped from 4-3.7 he has occasional urge incontinence he has no stroke gets up twice at night, to get a PSA and I am going to try him on a course of Gemtesa and he is to call me    Past Medical History:     Past Medical History:   Diagnosis Date   • Pre-diabetes        Current Meds:     Current Outpatient Medications   Medication Sig Dispense Refill   • apixaban (ELIQUIS) 5 MG tablet tablet Take 1 tablet by mouth Every 12 (Twelve) Hours. 60 tablet 3   • celecoxib (CeleBREX) 200 MG capsule Take 1 capsule by mouth 2 (Two) Times a Day As Needed. 60 capsule 5   • famotidine (PEPCID) 20 MG tablet Take 1 tablet by mouth Every Night at bedtime 30 tablet 12   • finasteride (PROSCAR) 5 MG tablet Take 1 tablet by mouth Daily. 30 tablet 5   • flecainide (TAMBOCOR) 100 MG tablet Take 1 tablet by mouth 2 (Two) Times a Day. 180 tablet 1   • ibuprofen (ADVIL,MOTRIN) 600 MG tablet Take 1 tablet by mouth Every 6 (Six) Hours As Needed. 30 tablet 0   • mesalamine (CANASA) 1000 MG suppository USE 1 SUPPOISTORY RECTALLY AS DIRECTED AT NIGHT. 30 suppository 5   • Omega-3 Fatty Acids (fish oil) 1000 MG capsule capsule Take  by mouth Daily With Breakfast.     • pantoprazole (Protonix) 40 MG EC tablet Take 1 tablet by mouth Daily. 90 tablet 4   • polyethylene glycol-electrolytes (GaviLyte-N with Flavor Pack) 420 g solution Take 8 Ounce(s) by mouth 16 times a day 4000 mL 0   • pregabalin (Lyrica) 100 MG capsule Take 1 capsule by mouth 2 times every day 10 capsule 1   • vitamin C (ASCORBIC ACID) 250 MG tablet Take 1 tablet by mouth Daily.     • Zoster Vac Recomb Adjuvanted (Shingrix) 50 MCG/0.5ML reconstituted suspension Inject 0.5 ml into the appropriate muscle as directed by prescriber. 0.5 mL 0     No current facility-administered medications for this visit.        Allergies:      No Known  Allergies     Past Surgical History:     Past Surgical History:   Procedure Laterality Date   • BACK SURGERY  2004    2012   • COLON RESECTION     • HUMERUS FRACTURE SURGERY     • ROTATOR CUFF REPAIR Right 2000   • SKIN CANCER EXCISION  2010    Melanoma resection       Social History:     Social History     Socioeconomic History   • Marital status:    Tobacco Use   • Smoking status: Former     Types: Cigarettes     Quit date:      Years since quittin.4     Passive exposure: Past   • Smokeless tobacco: Never   Vaping Use   • Vaping Use: Never used   Substance and Sexual Activity   • Alcohol use: Yes     Comment: 3 oz    • Drug use: No   • Sexual activity: Defer       Family History:     Family History   Problem Relation Age of Onset   • Diverticulitis Mother    • Rheum arthritis Mother    • Diabetes Mother    • Lung cancer Father    • Heart disease Father    • Heart disease Brother        Review of Systems:     Review of Systems   Constitutional: Negative.    HENT: Negative.    Eyes: Negative.    Respiratory: Negative.    Cardiovascular: Negative.    Gastrointestinal: Negative.    Endocrine: Negative.    Musculoskeletal: Negative.    Allergic/Immunologic: Negative.    Neurological: Negative.    Hematological: Negative.    Psychiatric/Behavioral: Negative.        Physical Exam:     Physical Exam  Vitals and nursing note reviewed.   Constitutional:       Appearance: He is well-developed.   HENT:      Head: Normocephalic and atraumatic.   Eyes:      Conjunctiva/sclera: Conjunctivae normal.      Pupils: Pupils are equal, round, and reactive to light.   Cardiovascular:      Rate and Rhythm: Normal rate and regular rhythm.      Heart sounds: Normal heart sounds.   Pulmonary:      Effort: Pulmonary effort is normal.      Breath sounds: Normal breath sounds.   Abdominal:      General: Bowel sounds are normal.      Palpations: Abdomen is soft.   Musculoskeletal:         General: Normal range of motion.       Cervical back: Normal range of motion.   Skin:     General: Skin is warm and dry.   Neurological:      Mental Status: He is alert and oriented to person, place, and time.      Deep Tendon Reflexes: Reflexes are normal and symmetric.   Psychiatric:         Behavior: Behavior normal.         Thought Content: Thought content normal.         Judgment: Judgment normal.         I have reviewed the following portions of the patient's history: Allergies, current medications, past family history, past medical history, past social history, past surgical history, problem list, and ROS and confirm it is accurate.    Recent Image (CT and/or KUB):      CT Abdomen and Pelvis: No results found for this or any previous visit.       CT Stone Protocol: No results found for this or any previous visit.       KUB: No results found for this or any previous visit.       Labs (past 3 months):      Lab on 05/17/2023   Component Date Value Ref Range Status   • Vitamin B-12 05/17/2023 484  211 - 946 pg/mL Final   • Folate 05/17/2023 >20.00  4.78 - 24.20 ng/mL Final   • Uric Acid 05/17/2023 4.0  3.4 - 7.0 mg/dL Final   • Iron 05/17/2023 52 (L)  59 - 158 mcg/dL Final   • Iron Saturation 05/17/2023 14 (L)  20 - 50 % Final   • Transferrin 05/17/2023 258  200 - 360 mg/dL Final   • TIBC 05/17/2023 384  298 - 536 mcg/dL Final   • Hemoglobin A1C 05/17/2023 5.90 (H)  4.80 - 5.60 % Final   • Magnesium 05/17/2023 2.4  1.6 - 2.4 mg/dL Final   • Ferritin 05/17/2023 432.00 (H)  30.00 - 400.00 ng/mL Final   • Reticulocyte % 05/17/2023 1.43  0.70 - 1.90 % Final   • Reticulocyte Absolute 05/17/2023 0.0612  0.0200 - 0.1300 10*6/mm3 Final   • C-Reactive Protein 05/17/2023 <0.30  0.00 - 0.50 mg/dL Final   • 25 Hydroxy, Vitamin D 05/17/2023 30.4  30.0 - 100.0 ng/ml Final   • Total Cholesterol 05/17/2023 171  0 - 200 mg/dL Final   • Triglycerides 05/17/2023 61  0 - 150 mg/dL Final   • HDL Cholesterol 05/17/2023 73 (H)  40 - 60 mg/dL Final   • LDL Cholesterol   05/17/2023 86  0 - 100 mg/dL Final   • VLDL Cholesterol 05/17/2023 12  5 - 40 mg/dL Final   • LDL/HDL Ratio 05/17/2023 1.18   Final   • Glucose 05/17/2023 119 (H)  65 - 99 mg/dL Final   • BUN 05/17/2023 21  8 - 23 mg/dL Final   • Creatinine 05/17/2023 0.77  0.76 - 1.27 mg/dL Final   • Sodium 05/17/2023 141  136 - 145 mmol/L Final   • Potassium 05/17/2023 5.0  3.5 - 5.2 mmol/L Final   • Chloride 05/17/2023 106  98 - 107 mmol/L Final   • CO2 05/17/2023 26.6  22.0 - 29.0 mmol/L Final   • Calcium 05/17/2023 9.8  8.6 - 10.5 mg/dL Final   • Total Protein 05/17/2023 6.8  6.0 - 8.5 g/dL Final   • Albumin 05/17/2023 4.2  3.5 - 5.2 g/dL Final   • ALT (SGPT) 05/17/2023 18  1 - 41 U/L Final   • AST (SGOT) 05/17/2023 15  1 - 40 U/L Final   • Alkaline Phosphatase 05/17/2023 64  39 - 117 U/L Final   • Total Bilirubin 05/17/2023 0.3  0.0 - 1.2 mg/dL Final   • Globulin 05/17/2023 2.6  gm/dL Final   • A/G Ratio 05/17/2023 1.6  g/dL Final   • BUN/Creatinine Ratio 05/17/2023 27.3 (H)  7.0 - 25.0 Final   • Anion Gap 05/17/2023 8.4  5.0 - 15.0 mmol/L Final   • eGFR 05/17/2023 89.4  >60.0 mL/min/1.73 Final   • TSH 05/17/2023 4.320 (H)  0.270 - 4.200 uIU/mL Final   • Free T4 05/17/2023 1.10  0.93 - 1.70 ng/dL Final   • PSA 05/17/2023 3.7  0.0 - 4.0 ng/mL Final    Roche ECLIA methodology.  According to the American U[employer] Association, Serum PSA should  decrease and remain at undetectable levels after radical  prostatectomy. The AUA defines biochemical recurrence as an initial  PSA value 0.2 ng/mL or greater followed by a subsequent confirmatory  PSA value 0.2 ng/mL or greater.  Values obtained with different assay methods or kits cannot be used  interchangeably. Results cannot be interpreted as absolute evidence  of the presence or absence of malignant disease.   • Reflex 05/17/2023 Comment   Final    The percent free PSA is performed on a reflex basis only when the  total PSA is between 4.0 and 10.0 ng/mL.   • WBC 05/17/2023 5.71  3.40  - 10.80 10*3/mm3 Final   • RBC 05/17/2023 4.28  4.14 - 5.80 10*6/mm3 Final   • Hemoglobin 05/17/2023 13.4  13.0 - 17.7 g/dL Final   • Hematocrit 05/17/2023 40.8  37.5 - 51.0 % Final   • MCV 05/17/2023 95.3  79.0 - 97.0 fL Final   • MCH 05/17/2023 31.3  26.6 - 33.0 pg Final   • MCHC 05/17/2023 32.8  31.5 - 35.7 g/dL Final   • RDW 05/17/2023 12.9  12.3 - 15.4 % Final   • RDW-SD 05/17/2023 44.2  37.0 - 54.0 fl Final   • MPV 05/17/2023 9.7  6.0 - 12.0 fL Final   • Platelets 05/17/2023 289  140 - 450 10*3/mm3 Final   • Neutrophil % 05/17/2023 40.7 (L)  42.7 - 76.0 % Final   • Lymphocyte % 05/17/2023 42.0  19.6 - 45.3 % Final   • Monocyte % 05/17/2023 9.8  5.0 - 12.0 % Final   • Eosinophil % 05/17/2023 6.1  0.3 - 6.2 % Final   • Basophil % 05/17/2023 1.2  0.0 - 1.5 % Final   • Immature Grans % 05/17/2023 0.2  0.0 - 0.5 % Final   • Neutrophils, Absolute 05/17/2023 2.32  1.70 - 7.00 10*3/mm3 Final   • Lymphocytes, Absolute 05/17/2023 2.40  0.70 - 3.10 10*3/mm3 Final   • Monocytes, Absolute 05/17/2023 0.56  0.10 - 0.90 10*3/mm3 Final   • Eosinophils, Absolute 05/17/2023 0.35  0.00 - 0.40 10*3/mm3 Final   • Basophils, Absolute 05/17/2023 0.07  0.00 - 0.20 10*3/mm3 Final   • Immature Grans, Absolute 05/17/2023 0.01  0.00 - 0.05 10*3/mm3 Final   • [facility] 05/17/2023 0.0  0.0 - 0.2 /100 WBC Final        Procedure:       Assessment/Plan:   Elevated prostate specific antigen-we discussed the diagnosis of elevated prostate-specific antigen.  I explained the pathophysiology of PSA.  It is a serine protease that's function in the male reproductive tract is to facilitate the liquefaction of semen.  It is for this reason the body does not want it freely floating in the serum and why typically bound tightly to albumin.  We discussed why we used both a PSA free and total to determine the need for more aggressive therapy. I discussed the normal range.  Additionally, it was in the range of 1 to 4, but more recently has been downgraded to  something less than 2 or even approaching 1.  I discussed the risk of family history, particularly the fact that the average male has a 14% risk of prostate cancer and that in the face of a positive diagnosis in a father it will tablet and any other first-generation relative continued tablet insofar that a father and brother with prostate cancer will produce almost a 50% risk of prostate cancer.  I discussed the use of the temporal use of PSA as the best option for monitoring.  We also discussed the fact that an elevated PSA is an isolated event does not mean that this is prostate cancer and should not engender worry in this regard. I discussed other things that can elevate PSA including constipation, prostatitis, infection, recent intercourse etc., as well as the risks and benefits associated with this.  Also discussed the fact that this is with a dilutional test and as a consequence of such were present produce slight variations on a single specimen.  Further discussed the risks and benefits of a prostate biopsy as well.  PSA is stable  Detrusor instability-patient has been diagnosed with detrusor instability which is an irritative bladder symptomatology most likely related to factors such as intake of bladder irritants, postinfectious irritation, prolapse, with a very large differential diagnosis.  The mainstay of treatment has been tight cholinergics which basically cause the bladder to have decreased contractility.  We have discussed the side effects of these treatments including dry mouth, double vision, and increasing constipation.  Try a course of Gemtesa      This document has been electronically signed by CHIKA MINOR MD May 22, 2023 13:58 EDT    Dictated Utilizing Dragon Dictation: Part of this note may be an electronic transcription/translation of spoken language to printed text using the Dragon Dictation System.

## 2023-07-26 RX ORDER — FLECAINIDE ACETATE 100 MG/1
100 TABLET ORAL 2 TIMES DAILY
Qty: 180 TABLET | Refills: 1 | OUTPATIENT
Start: 2023-07-26

## 2023-07-26 NOTE — TELEPHONE ENCOUNTER
Spoke with patient-gave him an appt for 07/31-he asks if we can go ahead and order an EKG so he can get it done prior?

## 2023-07-27 ENCOUNTER — LAB (OUTPATIENT)
Dept: LAB | Facility: HOSPITAL | Age: 83
End: 2023-07-27
Payer: MEDICARE

## 2023-07-27 DIAGNOSIS — R97.20 ELEVATED PROSTATE SPECIFIC ANTIGEN (PSA): Primary | ICD-10-CM

## 2023-07-27 DIAGNOSIS — R97.20 ELEVATED PROSTATE SPECIFIC ANTIGEN (PSA): ICD-10-CM

## 2023-07-27 PROCEDURE — 84154 ASSAY OF PSA FREE: CPT

## 2023-07-27 PROCEDURE — 84153 ASSAY OF PSA TOTAL: CPT

## 2023-07-27 PROCEDURE — 36415 COLL VENOUS BLD VENIPUNCTURE: CPT

## 2023-07-28 LAB
PSA FREE MFR SERPL: 10 %
PSA FREE SERPL-MCNC: 0.43 NG/ML
PSA SERPL-MCNC: 4.3 NG/ML (ref 0–4)

## 2023-07-31 ENCOUNTER — PREP FOR SURGERY (OUTPATIENT)
Dept: OTHER | Facility: HOSPITAL | Age: 83
End: 2023-07-31
Payer: MEDICARE

## 2023-07-31 ENCOUNTER — OFFICE VISIT (OUTPATIENT)
Dept: CARDIOLOGY | Facility: CLINIC | Age: 83
End: 2023-07-31
Payer: MEDICARE

## 2023-07-31 VITALS
DIASTOLIC BLOOD PRESSURE: 62 MMHG | BODY MASS INDEX: 25.57 KG/M2 | WEIGHT: 178.6 LBS | HEART RATE: 49 BPM | RESPIRATION RATE: 16 BRPM | OXYGEN SATURATION: 94 % | HEIGHT: 70 IN | SYSTOLIC BLOOD PRESSURE: 138 MMHG

## 2023-07-31 DIAGNOSIS — I48.0 PAROXYSMAL ATRIAL FIBRILLATION: Primary | ICD-10-CM

## 2023-07-31 PROCEDURE — 93000 ELECTROCARDIOGRAM COMPLETE: CPT | Performed by: PHYSICIAN ASSISTANT

## 2023-07-31 PROCEDURE — 99214 OFFICE O/P EST MOD 30 MIN: CPT | Performed by: PHYSICIAN ASSISTANT

## 2023-07-31 RX ORDER — FLECAINIDE ACETATE 50 MG/1
50 TABLET ORAL 2 TIMES DAILY
Qty: 180 TABLET | Refills: 2 | Status: SHIPPED | OUTPATIENT
Start: 2023-07-31

## 2023-07-31 RX ORDER — FLECAINIDE ACETATE 100 MG/1
100 TABLET ORAL 2 TIMES DAILY
Qty: 180 TABLET | Refills: 1 | OUTPATIENT
Start: 2023-07-31

## 2023-08-07 ENCOUNTER — PREP FOR SURGERY (OUTPATIENT)
Dept: OTHER | Facility: HOSPITAL | Age: 83
End: 2023-08-07
Payer: MEDICARE

## 2023-08-07 DIAGNOSIS — I48.0 PAROXYSMAL ATRIAL FIBRILLATION: Primary | ICD-10-CM

## 2023-08-08 ENCOUNTER — LAB (OUTPATIENT)
Dept: LAB | Facility: HOSPITAL | Age: 83
End: 2023-08-08
Payer: MEDICARE

## 2023-08-08 ENCOUNTER — HOSPITAL ENCOUNTER (OUTPATIENT)
Dept: RESPIRATORY THERAPY | Facility: HOSPITAL | Age: 83
Discharge: HOME | End: 2023-08-08
Payer: MEDICARE

## 2023-08-08 DIAGNOSIS — I48.0 PAROXYSMAL ATRIAL FIBRILLATION: ICD-10-CM

## 2023-08-08 LAB
QT INTERVAL: 524 MS
QTC INTERVAL: 463 MS

## 2023-08-08 PROCEDURE — 93005 ELECTROCARDIOGRAM TRACING: CPT | Performed by: PHYSICIAN ASSISTANT

## 2023-08-08 PROCEDURE — 83735 ASSAY OF MAGNESIUM: CPT | Performed by: PHYSICIAN ASSISTANT

## 2023-08-08 PROCEDURE — 80048 BASIC METABOLIC PNL TOTAL CA: CPT | Performed by: PHYSICIAN ASSISTANT

## 2023-08-27 DIAGNOSIS — D48.5 NEOPLASM OF UNCERTAIN BEHAVIOR OF SKIN: ICD-10-CM

## 2023-08-28 RX ORDER — FINASTERIDE 5 MG/1
5 TABLET, FILM COATED ORAL DAILY
Qty: 30 TABLET | Refills: 5 | Status: SHIPPED | OUTPATIENT
Start: 2023-08-28

## 2023-11-08 DIAGNOSIS — R97.20 ELEVATED PROSTATE SPECIFIC ANTIGEN (PSA): Primary | ICD-10-CM

## 2023-11-13 ENCOUNTER — LAB (OUTPATIENT)
Dept: LAB | Facility: HOSPITAL | Age: 83
End: 2023-11-13
Payer: MEDICARE

## 2023-11-13 PROCEDURE — 84153 ASSAY OF PSA TOTAL: CPT | Performed by: UROLOGY

## 2023-11-14 LAB — PSA SERPL-MCNC: 4.8 NG/ML (ref 0–4)

## 2023-11-15 ENCOUNTER — TELEPHONE (OUTPATIENT)
Dept: UROLOGY | Facility: CLINIC | Age: 83
End: 2023-11-15
Payer: MEDICARE

## 2023-11-15 NOTE — TELEPHONE ENCOUNTER
I called the pt and let him know that his PSA continues to rise and that Anibal wants him to have a MRI of his prostate.    ----- Message from Twin Barnett MD sent at 11/14/2023  2:27 PM EST -----  Get MRI of prostate  ----- Message -----  From: Lab, Background User  Sent: 11/14/2023   8:17 AM EST  To: Twin Barnett MD

## 2023-11-16 ENCOUNTER — OFFICE VISIT (OUTPATIENT)
Dept: CARDIOLOGY | Facility: CLINIC | Age: 83
End: 2023-11-16
Payer: MEDICARE

## 2023-11-16 VITALS
DIASTOLIC BLOOD PRESSURE: 80 MMHG | HEART RATE: 46 BPM | WEIGHT: 176.1 LBS | OXYGEN SATURATION: 98 % | HEIGHT: 70 IN | SYSTOLIC BLOOD PRESSURE: 168 MMHG | BODY MASS INDEX: 25.21 KG/M2

## 2023-11-16 DIAGNOSIS — I48.0 PAROXYSMAL ATRIAL FIBRILLATION: Primary | ICD-10-CM

## 2023-11-16 PROCEDURE — 93000 ELECTROCARDIOGRAM COMPLETE: CPT | Performed by: INTERNAL MEDICINE

## 2023-11-16 PROCEDURE — 99214 OFFICE O/P EST MOD 30 MIN: CPT | Performed by: INTERNAL MEDICINE

## 2023-11-16 NOTE — PROGRESS NOTES
Jimmy Zhang MD  Henry Olea  1940  11/16/2023    Patient Active Problem List   Diagnosis    Skin lesion    Suture check    Neoplasm of uncertain behavior of skin    Benign prostatic hyperplasia with urinary hesitancy       Dear Jimmy Zhang MD:    Subjective     Henry Olea is a 82 y.o. 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 twice daily.  He was previously on Eliquis which she stopped taking it after he had some blood in the stools.  He is here for regular cardiology follow-up.  On today's visit he denies any significant palpitations, dizziness or syncope.  He states his blood pressure at home runs in the 140s systolic over 80s diastolic.  He denies any chest pains or shortness of breath.  He stays fairly active still playing tennis and hiking regularly.    Complete Transthoracic Echocardiogram with Complete Doppler and Color Flow    Accession Number: [id number]   Date of Study: 9/22/21   Ordering Provider: Sinan Nam MD   Clinical Indications: Arrhythmia, AFib        Interpreting Physicians  Performing Staff   Sinan Nam MD Tech: Macy Philip          Clinical Indication  Arrhythmia; AFib   Dx: Paroxysmal atrial fibrillation [I48.0 (ICD-10-CM)]     Interpretation Summary  Normal left ventricular cavity size and wall thickness noted. All left ventricular wall segments contract normally.  Left ventricular ejection fraction appears to be 56 - 60%.  Left ventricular diastolic function is consistent with (grade I) impaired relaxation.  The aortic valve is abnormal in structure. The aortic valve exhibits sclerosis. The aortic valve appears trileaflet. Trace aortic valve regurgitation is present. No  aortic valve stenosis is present.  The mitral valve is structurally normal with no significant stenosis present. Trace mitral valve regurgitation is present.  There is no evidence of pericardial effusion.    No Known Allergies:    Current Outpatient Medications:     celecoxib (CeleBREX) 200 MG capsule, Take 1 capsule by mouth 2 (Two) Times a Day As Needed., Disp: 180 capsule, Rfl: 5    finasteride (PROSCAR) 5 MG tablet, Take 1 tablet by mouth Daily., Disp: 30 tablet, Rfl: 5    flecainide (TAMBOCOR) 50 MG tablet, Take 1 tablet by mouth 2 (Two) Times a Day., Disp: 180 tablet, Rfl: 2    ibuprofen (ADVIL,MOTRIN) 600 MG tablet, Take 1 tablet by mouth Every 6 (Six) Hours As Needed., Disp: 30 tablet, Rfl: 0    Omega-3 Fatty Acids (fish oil) 1000 MG capsule capsule, Take  by mouth Daily With Breakfast., Disp: , Rfl:     pantoprazole (Protonix) 40 MG EC tablet, Take 1 tablet by mouth Daily., Disp: 90 tablet, Rfl: 4    vitamin C (ASCORBIC ACID) 250 MG tablet, Take 1 tablet by mouth Daily., Disp: , Rfl:     Zoster Vac Recomb Adjuvanted (Shingrix) 50 MCG/0.5ML reconstituted suspension, Inject 0.5 ml into the appropriate muscle as directed by prescriber., Disp: 0.5 mL, Rfl: 0    polyethylene glycol-electrolytes (GaviLyte-N with Flavor Pack) 420 g solution, Take 8 Ounce(s) by mouth 16 times a day (Patient not taking: Reported on 2023), Disp: 4000 mL, Rfl: 0    The following portions of the patient's history were reviewed and updated as appropriate: allergies, current medications, past family history, past medical history, past social history, past surgical history and problem list.    Social History     Tobacco Use    Smoking status: Former     Types: Cigarettes     Quit date: 1980     Years since quittin.9     Passive exposure: Past    Smokeless tobacco: Never   Vaping Use    Vaping Use: Never used   Substance Use Topics    Alcohol use: Yes     Comment: 3 oz     Drug use: No     Review of Systems   Constitutional:  "Negative for chills and fever.   HENT:  Negative for nosebleeds and sore throat.    Respiratory:  Negative for cough, hemoptysis and wheezing.    Gastrointestinal:  Negative for abdominal pain, hematemesis, hematochezia, melena, nausea and vomiting.   Genitourinary:  Negative for dysuria and hematuria.   Neurological:  Negative for headaches.     Objective   Vitals:    11/16/23 1533   BP: 168/80   BP Location: Right arm   Patient Position: Sitting   Cuff Size: Adult   Pulse: (!) 46   SpO2: 98%   Weight: 79.9 kg (176 lb 1.6 oz)   Height: 177.8 cm (70\")     Body mass index is 25.27 kg/m².    Vitals reviewed.   Constitutional:       Appearance: Well-developed.   Eyes:      Conjunctiva/sclera: Conjunctivae normal.   HENT:      Head: Normocephalic.   Neck:      Thyroid: No thyromegaly.      Vascular: No JVD.      Trachea: No tracheal deviation.   Pulmonary:      Effort: No respiratory distress.      Breath sounds: Normal breath sounds. No wheezing. No rales.   Cardiovascular:      PMI at left midclavicular line. Normal rate. Regular rhythm. Normal S1. Normal S2.       Murmurs: There is no murmur.      No gallop.  No click. No rub.   Pulses:     Intact distal pulses.   Edema:     Peripheral edema absent.   Abdominal:      General: Bowel sounds are normal.      Palpations: Abdomen is soft. There is no abdominal mass.      Tenderness: There is no abdominal tenderness.   Musculoskeletal:      Cervical back: Normal range of motion and neck supple. Skin:     General: Skin is warm and dry.   Neurological:      Mental Status: Alert and oriented to person, place, and time.      Cranial Nerves: No cranial nerve deficit.         Lab Results   Component Value Date     08/08/2023    K 5.0 08/08/2023     08/08/2023    CO2 27.2 08/08/2023    BUN 27 (H) 08/08/2023    CREATININE 0.96 08/08/2023    GLUCOSE 110 (H) 08/08/2023    CALCIUM 9.9 08/08/2023    AST 15 05/17/2023    ALT 18 05/17/2023    ALKPHOS 64 05/17/2023    LABIL2 " "1.6 06/03/2016     No results found for: \"CKTOTAL\"  Lab Results   Component Value Date    WBC 5.71 05/17/2023    HGB 13.4 05/17/2023    HCT 40.8 05/17/2023     05/17/2023     No results found for: \"INR\"  Lab Results   Component Value Date    MG 2.3 08/08/2023     Lab Results   Component Value Date    TSH 4.320 (H) 05/17/2023    PSA 4.8 (H) 11/13/2023    CHLPL 191 06/03/2016    TRIG 61 05/17/2023    HDL 73 (H) 05/17/2023    LDL 86 05/17/2023      No results found for: \"BNP\"    ECG 12 Lead    Date/Time: 11/16/2023 4:23 PM  Performed by: Sinan Nam MD    Authorized by: Sinan Nam MD  Comparison: compared with previous ECG from 8/8/2023  Similar to previous ECG  Rhythm: sinus bradycardia  BPM: 47  Conduction: 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 Paroxysmal atrial fibrillation          Recommendations  I had a long discussion about his EKG changes with electrical conduction abnormalities including first-degree AV block, questionable right bundle branch block and left anterior fascicular block with baseline sinus bradycardia which she has had for a long time.  With all this, he is totally asymptomatic at this time.  I have also discussed about continuing the flecainide or maybe decreasing the dose to 25 mg p.o. twice daily although this may not be therapeutic and he could potentially go back into atrial fibrillation.  I also discussed about referring him to electrophysiologist for an opinion regarding his electrical conduction abnormalities.  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  I am going to decrease the dose of flecainide to 25 mg p.o. twice daily and see how he does.  I have discussed with him about the importance " of taking bloodthinner(Eliquis) foe stroke prevention from atrial fibrillation with  [id number] score of 2 for his age. He says he understands the risk but still does not want to take it due to intermittant hemmoridal bleeding.  I will refer him to Dr. Richardson/ for opinion regarding his paroxysmal atrial fibrillation and electrical conduction abnormalities and opinion regarding need for permanent pacemaker implantation in the future.  Dr. Olea is agreeable with this plan.    Return in about 3 months (around 2/16/2024) for or sooner if needed.    As always, Jimmy Zhang MD  I appreciate very much the opportunity to participate in the cardiovascular care of your patients. Please do not hesitate to call me with any questions with regards to Henry Olea's evaluation and management.       With Best Regards,        Sinan Nam MD, [facility]    Please note that portions of this note were completed with a voice recognition program.

## 2023-11-16 NOTE — LETTER
November 19, 2023     Jimmy Zhang MD  [address] [facility] [place] [address]    Patient: Henry Olea   YOB: 1940   Date of Visit: 11/16/2023     Dear Jimmy Zhang MD:       Thank you for referring Henry Olea to me for evaluation. Below are the relevant portions of my assessment and plan of care.    If you have questions, please do not hesitate to call me. I look forward to following Henry along with you.         Sincerely,        Sinan Nam MD        CC: No Recipients    Sinan Nam MD  11/19/23 1445  Sign when Signing Visit  Jimmy Zhang MD  Henry Olea  1940  11/16/2023    Patient Active Problem List   Diagnosis   • Skin lesion   • Suture check   • Neoplasm of uncertain behavior of skin   • Benign prostatic hyperplasia with urinary hesitancy       Dear Jimmy Zhang MD:    Subjective    Henry Olea is a 82 y.o. 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 twice daily.  He was previously on Eliquis which she stopped taking it after he had some blood in the stools.  He is here for regular cardiology follow-up.  On today's visit he denies any significant palpitations, dizziness or syncope.  He states his blood pressure at home runs in the 140s systolic over 80s diastolic.  He denies any chest pains or shortness of breath.  He stays fairly active still playing tennis and hiking regularly.    Complete Transthoracic Echocardiogram with Complete Doppler and Color Flow    Accession Number: [id number]   Date of Study: 9/22/21   Ordering Provider: Sinan Nam MD   Clinical Indications: Arrhythmia, AFib        Interpreting Physicians  Performing Staff   Sinan Nam MD  Tech: Macy Philip          Clinical Indication  Arrhythmia; AFib   Dx: Paroxysmal atrial fibrillation [I48.0 (ICD-10-CM)]     Interpretation Summary  Normal left ventricular cavity size and wall thickness noted. All left ventricular wall segments contract normally.  Left ventricular ejection fraction appears to be 56 - 60%.  Left ventricular diastolic function is consistent with (grade I) impaired relaxation.  The aortic valve is abnormal in structure. The aortic valve exhibits sclerosis. The aortic valve appears trileaflet. Trace aortic valve regurgitation is present. No aortic valve stenosis is present.  The mitral valve is structurally normal with no significant stenosis present. Trace mitral valve regurgitation is present.  There is no evidence of pericardial effusion.    No Known Allergies:    Current Outpatient Medications:   •  celecoxib (CeleBREX) 200 MG capsule, Take 1 capsule by mouth 2 (Two) Times a Day As Needed., Disp: 180 capsule, Rfl: 5  •  finasteride (PROSCAR) 5 MG tablet, Take 1 tablet by mouth Daily., Disp: 30 tablet, Rfl: 5  •  flecainide (TAMBOCOR) 50 MG tablet, Take 1 tablet by mouth 2 (Two) Times a Day., Disp: 180 tablet, Rfl: 2  •  ibuprofen (ADVIL,MOTRIN) 600 MG tablet, Take 1 tablet by mouth Every 6 (Six) Hours As Needed., Disp: 30 tablet, Rfl: 0  •  Omega-3 Fatty Acids (fish oil) 1000 MG capsule capsule, Take  by mouth Daily With Breakfast., Disp: , Rfl:   •  pantoprazole (Protonix) 40 MG EC tablet, Take 1 tablet by mouth Daily., Disp: 90 tablet, Rfl: 4  •  vitamin C (ASCORBIC ACID) 250 MG tablet, Take 1 tablet by mouth Daily., Disp: , Rfl:   •  Zoster Vac Recomb Adjuvanted (Shingrix) 50 MCG/0.5ML reconstituted suspension, Inject 0.5 ml into the appropriate muscle as directed by prescriber., Disp: 0.5 mL, Rfl: 0  •  polyethylene glycol-electrolytes (GaviLyte-N with Flavor Pack) 420 g solution, Take 8 Ounce(s) by mouth 16 times a day (Patient not taking: Reported on 7/31/2023),  "Disp: 4000 mL, Rfl: 0    The following portions of the patient's history were reviewed and updated as appropriate: allergies, current medications, past family history, past medical history, past social history, past surgical history and problem list.    Social History     Tobacco Use   • Smoking status: Former     Types: Cigarettes     Quit date: 1980     Years since quittin.9     Passive exposure: Past   • Smokeless tobacco: Never   Vaping Use   • Vaping Use: Never used   Substance Use Topics   • Alcohol use: Yes     Comment: 3 oz    • Drug use: No     Review of Systems   Constitutional: Negative for chills and fever.   HENT:  Negative for nosebleeds and sore throat.    Respiratory:  Negative for cough, hemoptysis and wheezing.    Gastrointestinal:  Negative for abdominal pain, hematemesis, hematochezia, melena, nausea and vomiting.   Genitourinary:  Negative for dysuria and hematuria.   Neurological:  Negative for headaches.     Objective  Vitals:    23 1533   BP: 168/80   BP Location: Right arm   Patient Position: Sitting   Cuff Size: Adult   Pulse: (!) 46   SpO2: 98%   Weight: 79.9 kg (176 lb 1.6 oz)   Height: 177.8 cm (70\")     Body mass index is 25.27 kg/m².    Vitals reviewed.   Constitutional:       Appearance: Well-developed.   Eyes:      Conjunctiva/sclera: Conjunctivae normal.   HENT:      Head: Normocephalic.   Neck:      Thyroid: No thyromegaly.      Vascular: No JVD.      Trachea: No tracheal deviation.   Pulmonary:      Effort: No respiratory distress.      Breath sounds: Normal breath sounds. No wheezing. No rales.   Cardiovascular:      PMI at left midclavicular line. Normal rate. Regular rhythm. Normal S1. Normal S2.       Murmurs: There is no murmur.      No gallop.  No click. No rub.   Pulses:     Intact distal pulses.   Edema:     Peripheral edema absent.   Abdominal:      General: Bowel sounds are normal.      Palpations: Abdomen is soft. There is no abdominal mass.      Tenderness: " "There is no abdominal tenderness.   Musculoskeletal:      Cervical back: Normal range of motion and neck supple. Skin:     General: Skin is warm and dry.   Neurological:      Mental Status: Alert and oriented to person, place, and time.      Cranial Nerves: No cranial nerve deficit.         Lab Results   Component Value Date     08/08/2023    K 5.0 08/08/2023     08/08/2023    CO2 27.2 08/08/2023    BUN 27 (H) 08/08/2023    CREATININE 0.96 08/08/2023    GLUCOSE 110 (H) 08/08/2023    CALCIUM 9.9 08/08/2023    AST 15 05/17/2023    ALT 18 05/17/2023    ALKPHOS 64 05/17/2023    LABIL2 1.6 06/03/2016     No results found for: \"CKTOTAL\"  Lab Results   Component Value Date    WBC 5.71 05/17/2023    HGB 13.4 05/17/2023    HCT 40.8 05/17/2023     05/17/2023     No results found for: \"INR\"  Lab Results   Component Value Date    MG 2.3 08/08/2023     Lab Results   Component Value Date    TSH 4.320 (H) 05/17/2023    PSA 4.8 (H) 11/13/2023    CHLPL 191 06/03/2016    TRIG 61 05/17/2023    HDL 73 (H) 05/17/2023    LDL 86 05/17/2023      No results found for: \"BNP\"    ECG 12 Lead    Date/Time: 11/16/2023 4:23 PM  Performed by: Sinan Nam MD    Authorized by: Sinan Nam MD  Comparison: compared with previous ECG from 8/8/2023  Similar to previous ECG  Rhythm: sinus bradycardia  BPM: 47  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 Paroxysmal atrial fibrillation          Recommendations  I had a long discussion about his EKG changes with electrical conduction abnormalities including first-degree AV block, questionable right bundle branch block and left anterior fascicular block with baseline sinus bradycardia which she has had for a long time.  With all this, he is totally asymptomatic at this time.  I have also discussed about " continuing the flecainide or maybe decreasing the dose to 25 mg p.o. twice daily although this may not be therapeutic and he could potentially go back into atrial fibrillation.  I also discussed about referring him to electrophysiologist for an opinion regarding his electrical conduction abnormalities.  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  I am going to decrease the dose of flecainide to 25 mg p.o. twice daily and see how he does.  I will refer him to Dr. Richardson/ for opinion regarding his paroxysmal atrial fibrillation and electrical conduction abnormalities and opinion regarding need for permanent pacemaker implantation in the future.  Dr. Olea is agreeable with this plan.    Return in about 3 months (around 2/16/2024) for or sooner if needed.    As always, Jimmy Zhang MD  I appreciate very much the opportunity to participate in the cardiovascular care of your patients. Please do not hesitate to call me with any questions with regards to Henry Olea's evaluation and management.       With Best Regards,        Sinan Nam MD, [facility]    Please note that portions of this note were completed with a voice recognition program.

## 2023-11-16 NOTE — LETTER
November 19, 2023     Jimmy Zhang MD  [address] [facility] [place] [address]    Patient: Henry Olea   YOB: 1940   Date of Visit: 11/16/2023     Dear Jimmy Zhang MD:       Thank you for referring Henry Olea to me for evaluation. Below are the relevant portions of my assessment and plan of care.    If you have questions, please do not hesitate to call me. I look forward to following Henry along with you.         Sincerely,        Sinan Nam MD        CC: No Recipients    Sinan Nam MD  11/19/23 1450  Addendum  Jimmy Zhang MD  Henry Olea  1940  11/16/2023    Patient Active Problem List   Diagnosis   • Skin lesion   • Suture check   • Neoplasm of uncertain behavior of skin   • Benign prostatic hyperplasia with urinary hesitancy       Dear Jimmy Zhang MD:    Subjective    Henry Olea is a 82 y.o. 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 twice daily.  He was previously on Eliquis which she stopped taking it after he had some blood in the stools.  He is here for regular cardiology follow-up.  On today's visit he denies any significant palpitations, dizziness or syncope.  He states his blood pressure at home runs in the 140s systolic over 80s diastolic.  He denies any chest pains or shortness of breath.  He stays fairly active still playing tennis and hiking regularly.    Complete Transthoracic Echocardiogram with Complete Doppler and Color Flow    Accession Number: [id number]   Date of Study: 9/22/21   Ordering Provider: Sinan Nam MD   Clinical Indications: Arrhythmia, AFib        Interpreting Physicians  Performing Staff   Sinan Nam MD Tech:  Macy Philip          Clinical Indication  Arrhythmia; AFib   Dx: Paroxysmal atrial fibrillation [I48.0 (ICD-10-CM)]     Interpretation Summary  Normal left ventricular cavity size and wall thickness noted. All left ventricular wall segments contract normally.  Left ventricular ejection fraction appears to be 56 - 60%.  Left ventricular diastolic function is consistent with (grade I) impaired relaxation.  The aortic valve is abnormal in structure. The aortic valve exhibits sclerosis. The aortic valve appears trileaflet. Trace aortic valve regurgitation is present. No aortic valve stenosis is present.  The mitral valve is structurally normal with no significant stenosis present. Trace mitral valve regurgitation is present.  There is no evidence of pericardial effusion.    No Known Allergies:    Current Outpatient Medications:   •  celecoxib (CeleBREX) 200 MG capsule, Take 1 capsule by mouth 2 (Two) Times a Day As Needed., Disp: 180 capsule, Rfl: 5  •  finasteride (PROSCAR) 5 MG tablet, Take 1 tablet by mouth Daily., Disp: 30 tablet, Rfl: 5  •  flecainide (TAMBOCOR) 50 MG tablet, Take 1 tablet by mouth 2 (Two) Times a Day., Disp: 180 tablet, Rfl: 2  •  ibuprofen (ADVIL,MOTRIN) 600 MG tablet, Take 1 tablet by mouth Every 6 (Six) Hours As Needed., Disp: 30 tablet, Rfl: 0  •  Omega-3 Fatty Acids (fish oil) 1000 MG capsule capsule, Take  by mouth Daily With Breakfast., Disp: , Rfl:   •  pantoprazole (Protonix) 40 MG EC tablet, Take 1 tablet by mouth Daily., Disp: 90 tablet, Rfl: 4  •  vitamin C (ASCORBIC ACID) 250 MG tablet, Take 1 tablet by mouth Daily., Disp: , Rfl:   •  Zoster Vac Recomb Adjuvanted (Shingrix) 50 MCG/0.5ML reconstituted suspension, Inject 0.5 ml into the appropriate muscle as directed by prescriber., Disp: 0.5 mL, Rfl: 0  •  polyethylene glycol-electrolytes (GaviLyte-N with Flavor Pack) 420 g solution, Take 8 Ounce(s) by mouth 16 times a day (Patient not taking: Reported on 7/31/2023), Disp: 4000  "mL, Rfl: 0    The following portions of the patient's history were reviewed and updated as appropriate: allergies, current medications, past family history, past medical history, past social history, past surgical history and problem list.    Social History     Tobacco Use   • Smoking status: Former     Types: Cigarettes     Quit date: 1980     Years since quittin.9     Passive exposure: Past   • Smokeless tobacco: Never   Vaping Use   • Vaping Use: Never used   Substance Use Topics   • Alcohol use: Yes     Comment: 3 oz    • Drug use: No     Review of Systems   Constitutional: Negative for chills and fever.   HENT:  Negative for nosebleeds and sore throat.    Respiratory:  Negative for cough, hemoptysis and wheezing.    Gastrointestinal:  Negative for abdominal pain, hematemesis, hematochezia, melena, nausea and vomiting.   Genitourinary:  Negative for dysuria and hematuria.   Neurological:  Negative for headaches.     Objective  Vitals:    23 1533   BP: 168/80   BP Location: Right arm   Patient Position: Sitting   Cuff Size: Adult   Pulse: (!) 46   SpO2: 98%   Weight: 79.9 kg (176 lb 1.6 oz)   Height: 177.8 cm (70\")     Body mass index is 25.27 kg/m².    Vitals reviewed.   Constitutional:       Appearance: Well-developed.   Eyes:      Conjunctiva/sclera: Conjunctivae normal.   HENT:      Head: Normocephalic.   Neck:      Thyroid: No thyromegaly.      Vascular: No JVD.      Trachea: No tracheal deviation.   Pulmonary:      Effort: No respiratory distress.      Breath sounds: Normal breath sounds. No wheezing. No rales.   Cardiovascular:      PMI at left midclavicular line. Normal rate. Regular rhythm. Normal S1. Normal S2.       Murmurs: There is no murmur.      No gallop.  No click. No rub.   Pulses:     Intact distal pulses.   Edema:     Peripheral edema absent.   Abdominal:      General: Bowel sounds are normal.      Palpations: Abdomen is soft. There is no abdominal mass.      Tenderness: There is no " "abdominal tenderness.   Musculoskeletal:      Cervical back: Normal range of motion and neck supple. Skin:     General: Skin is warm and dry.   Neurological:      Mental Status: Alert and oriented to person, place, and time.      Cranial Nerves: No cranial nerve deficit.         Lab Results   Component Value Date     08/08/2023    K 5.0 08/08/2023     08/08/2023    CO2 27.2 08/08/2023    BUN 27 (H) 08/08/2023    CREATININE 0.96 08/08/2023    GLUCOSE 110 (H) 08/08/2023    CALCIUM 9.9 08/08/2023    AST 15 05/17/2023    ALT 18 05/17/2023    ALKPHOS 64 05/17/2023    LABIL2 1.6 06/03/2016     No results found for: \"CKTOTAL\"  Lab Results   Component Value Date    WBC 5.71 05/17/2023    HGB 13.4 05/17/2023    HCT 40.8 05/17/2023     05/17/2023     No results found for: \"INR\"  Lab Results   Component Value Date    MG 2.3 08/08/2023     Lab Results   Component Value Date    TSH 4.320 (H) 05/17/2023    PSA 4.8 (H) 11/13/2023    CHLPL 191 06/03/2016    TRIG 61 05/17/2023    HDL 73 (H) 05/17/2023    LDL 86 05/17/2023      No results found for: \"BNP\"    ECG 12 Lead    Date/Time: 11/16/2023 4:23 PM  Performed by: Sinan Nam MD    Authorized by: Sinan Nam MD  Comparison: compared with previous ECG from 8/8/2023  Similar to previous ECG  Rhythm: sinus bradycardia  BPM: 47  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 Paroxysmal atrial fibrillation          Recommendations  I had a long discussion about his EKG changes with electrical conduction abnormalities including first-degree AV block, questionable right bundle branch block and left anterior fascicular block with baseline sinus bradycardia which she has had for a long time.  With all this, he is totally asymptomatic at this time.  I have also discussed about continuing the " flecainide or maybe decreasing the dose to 25 mg p.o. twice daily although this may not be therapeutic and he could potentially go back into atrial fibrillation.  I also discussed about referring him to electrophysiologist for an opinion regarding his electrical conduction abnormalities.  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  I am going to decrease the dose of flecainide to 25 mg p.o. twice daily and see how he does.  I have discussed with him about the importance of taking bloodthinner(Eliquis) foe stroke prevention from atrial fibrillation with  [id number] score of 2 for his age. He says he understands the risk but still does not want to take it due to intermittant hemmoridal bleeding.  I will refer him to Dr. Richardson/ for opinion regarding his paroxysmal atrial fibrillation and electrical conduction abnormalities and opinion regarding need for permanent pacemaker implantation in the future.  Dr. Olea is agreeable with this plan.    Return in about 3 months (around 2/16/2024) for or sooner if needed.    As always, Jimmy Zhang MD  I appreciate very much the opportunity to participate in the cardiovascular care of your patients. Please do not hesitate to call me with any questions with regards to Henry Olea's evaluation and management.       With Best Regards,        Sinan Nam MD, [facility]    Please note that portions of this note were completed with a voice recognition program.

## 2023-11-20 ENCOUNTER — TELEPHONE (OUTPATIENT)
Dept: UROLOGY | Facility: CLINIC | Age: 83
End: 2023-11-20

## 2023-11-20 DIAGNOSIS — R97.20 ELEVATED PROSTATE SPECIFIC ANTIGEN (PSA): Primary | ICD-10-CM

## 2023-11-20 NOTE — TELEPHONE ENCOUNTER
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 He wanted to have it done before his follow up to see Dr. Barnett next week.

## 2023-11-21 DIAGNOSIS — R97.20 ELEVATED PROSTATE SPECIFIC ANTIGEN (PSA): Primary | ICD-10-CM

## 2023-12-07 ENCOUNTER — HOSPITAL ENCOUNTER (OUTPATIENT)
Dept: MRI IMAGING | Facility: HOSPITAL | Age: 83
Discharge: HOME | End: 2023-12-07
Admitting: UROLOGY
Payer: MEDICARE

## 2023-12-07 DIAGNOSIS — R97.20 ELEVATED PROSTATE SPECIFIC ANTIGEN (PSA): ICD-10-CM

## 2023-12-07 LAB — CREAT BLDA-MCNC: 0.7 MG/DL (ref 0.6–1.3)

## 2023-12-07 PROCEDURE — 0 GADOBENATE DIMEGLUMINE 529 MG/ML SOLUTION: Performed by: UROLOGY

## 2023-12-07 PROCEDURE — 72197 MRI PELVIS W/O & W/DYE: CPT

## 2023-12-07 PROCEDURE — 82565 ASSAY OF CREATININE: CPT

## 2023-12-07 PROCEDURE — A9577 INJ MULTIHANCE: HCPCS | Performed by: UROLOGY

## 2023-12-07 RX ADMIN — GADOBENATE DIMEGLUMINE 15 ML: 529 INJECTION, SOLUTION INTRAVENOUS at 10:33

## 2023-12-11 ENCOUNTER — OFFICE VISIT (OUTPATIENT)
Dept: UROLOGY | Facility: CLINIC | Age: 83
End: 2023-12-11
Payer: MEDICARE

## 2023-12-11 VITALS
SYSTOLIC BLOOD PRESSURE: 164 MMHG | WEIGHT: 178 LBS | BODY MASS INDEX: 25.48 KG/M2 | DIASTOLIC BLOOD PRESSURE: 73 MMHG | HEART RATE: 51 BPM | HEIGHT: 70 IN

## 2023-12-11 DIAGNOSIS — R39.11 BENIGN PROSTATIC HYPERPLASIA WITH URINARY HESITANCY: Primary | ICD-10-CM

## 2023-12-11 DIAGNOSIS — N40.1 BENIGN PROSTATIC HYPERPLASIA WITH URINARY HESITANCY: Primary | ICD-10-CM

## 2023-12-11 PROCEDURE — 1160F RVW MEDS BY RX/DR IN RCRD: CPT | Performed by: UROLOGY

## 2023-12-11 PROCEDURE — 99213 OFFICE O/P EST LOW 20 MIN: CPT | Performed by: UROLOGY

## 2023-12-11 PROCEDURE — 1159F MED LIST DOCD IN RCRD: CPT | Performed by: UROLOGY

## 2023-12-11 NOTE — PROGRESS NOTES
Chief Complaint:      Chief Complaint   Patient presents with    MRI Review       HPI:   83 y.o. 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 180 capsule 5    finasteride (PROSCAR) 5 MG tablet Take 1 tablet by mouth Daily. 30 tablet 5    flecainide (TAMBOCOR) 50 MG tablet Take 1 tablet by mouth 2 (Two) Times a Day. 180 tablet 2    ibuprofen (ADVIL,MOTRIN) 600 MG tablet Take 1 tablet by mouth Every 6 (Six) Hours As Needed. 30 tablet 0    Omega-3 Fatty Acids (fish oil) 1000 MG capsule capsule Take  by mouth Daily With Breakfast.      pantoprazole (Protonix) 40 MG EC tablet Take 1 tablet by mouth Daily. 90 tablet 4    vitamin C (ASCORBIC ACID) 250 MG tablet Take 1 tablet by mouth Daily.      Zoster Vac Recomb Adjuvanted (Shingrix) 50 MCG/0.5ML reconstituted suspension Inject 0.5 ml into the appropriate muscle as directed by prescriber. 0.5 mL 0    polyethylene glycol-electrolytes (GaviLyte-N with Flavor Pack) 420 g solution Take 8 Ounce(s) by mouth 16 times a day (Patient not taking: Reported on 7/31/2023) 4000 mL 0     No current facility-administered medications for this visit.        Allergies:      No Known Allergies     Past Surgical History:     Past Surgical History:   Procedure Laterality Date    BACK SURGERY  2004 2012    COLON RESECTION      HUMERUS FRACTURE SURGERY      ROTATOR CUFF REPAIR Right 2000    SKIN CANCER EXCISION  2010    Melanoma resection       Social History:     Social History     Socioeconomic History    Marital status:    Tobacco Use    Smoking  status: Former     Types: Cigarettes     Quit date:      Years since quittin.9     Passive exposure: Past    Smokeless tobacco: Never   Vaping Use    Vaping Use: Never used   Substance and Sexual Activity    Alcohol use: Yes     Comment: 3 oz     Drug use: No    Sexual activity: Defer       Family History:     Family History   Problem Relation Age of Onset    Diverticulitis Mother     Rheum arthritis Mother     Diabetes Mother     Lung cancer Father     Heart disease Father     Heart disease Brother        Review of Systems:     Review of Systems   Constitutional: Negative.    HENT: Negative.     Eyes: Negative.    Respiratory: Negative.     Cardiovascular: Negative.    Gastrointestinal: Negative.    Endocrine: Negative.    Musculoskeletal: Negative.    Allergic/Immunologic: Negative.    Neurological: Negative.    Hematological: Negative.    Psychiatric/Behavioral: Negative.         Physical Exam:     Physical Exam  Vitals and nursing note reviewed.   Constitutional:       Appearance: He is well-developed.   HENT:      Head: Normocephalic and atraumatic.   Eyes:      Conjunctiva/sclera: Conjunctivae normal.      Pupils: Pupils are equal, round, and reactive to light.   Cardiovascular:      Rate and Rhythm: Normal rate and regular rhythm.      Heart sounds: Normal heart sounds.   Pulmonary:      Effort: Pulmonary effort is normal.      Breath sounds: Normal breath sounds.   Abdominal:      General: Bowel sounds are normal.      Palpations: Abdomen is soft.   Musculoskeletal:         General: Normal range of motion.      Cervical back: Normal range of motion.   Skin:     General: Skin is warm and dry.   Neurological:      Mental Status: He is alert and oriented to person, place, and time.      Deep Tendon Reflexes: Reflexes are normal and symmetric.   Psychiatric:         Behavior: Behavior normal.         Thought Content: Thought content normal.         Judgment: Judgment normal.         I have reviewed the  following portions of the patient's history: Allergies, current medications, past family history, past medical history, past social history, past surgical history, problem list, and ROS and confirm it is accurate.    Recent Image (CT and/or KUB):      CT Abdomen and Pelvis: No results found for this or any previous visit.       CT Stone Protocol: No results found for this or any previous visit.       KUB: No results found for this or any previous visit.       Labs (past 3 months):      Hospital Outpatient Visit on 12/07/2023   Component Date Value Ref Range Status    Creatinine 12/07/2023 0.70  0.60 - 1.30 mg/dL Final    Serial Number: [id number]:  [id number]   Orders Only on 11/08/2023   Component Date Value Ref Range Status    PSA 11/13/2023 4.8 (H)  0.0 - 4.0 ng/mL Final    Roche ECLIA methodology.  According to the American Urological Association, Serum PSA should  decrease and remain at undetectable levels after radical  prostatectomy. The AUA defines biochemical recurrence as an initial  PSA value 0.2 ng/mL or greater followed by a subsequent confirmatory  PSA value 0.2 ng/mL or greater.  Values obtained with different assay methods or kits cannot be used  interchangeably. Results cannot be interpreted as absolute evidence  of the presence or absence of malignant disease.        Procedure:       Assessment/Plan:   Elevated prostate specific antigen-we discussed the diagnosis of elevated prostate-specific antigen.  I explained the pathophysiology of PSA.  It is a serine protease that's function in the male reproductive tract is to facilitate the liquefaction of semen.  It is for this reason the body does not want it freely floating in the serum and why typically bound tightly to albumin.  We discussed why we used both a PSA free and total to determine the need for more aggressive therapy. I discussed the normal range.  Additionally, it was in the range of 1 to 4, but more recently has been downgraded to  something less than 2 or even approaching 1.  I discussed the risk of family history, particularly the fact that the average male has a 14% risk of prostate cancer and that in the face of a positive diagnosis in a father it will tablet and any other first-generation relative continued tablet insofar that a father and brother with prostate cancer will produce almost a 50% risk of prostate cancer.  I discussed the use of the temporal use of PSA as the best option for monitoring.  We also discussed the fact that an elevated PSA is an isolated event does not mean that this is prostate cancer and should not engender worry in this regard. I discussed other things that can elevate PSA including constipation, prostatitis, infection, recent intercourse etc., as well as the risks and benefits associated with this.  Also discussed the fact that this is with a dilutional test and as a consequence of such were present produce slight variations on a single specimen.  Further discussed the risks and benefits of a prostate biopsy as well.  He has a PI-RADS 2 lesion PSA stable        This document has been electronically signed by CHIKA MINOR MD December 11, 2023 13:58 EST    Dictated Utilizing Dragon Dictation: Part of this note may be an electronic transcription/translation of spoken language to printed text using the Dragon Dictation System.

## 2023-12-22 ENCOUNTER — HOSPITAL ENCOUNTER (OUTPATIENT)
Dept: RESPIRATORY THERAPY | Facility: HOSPITAL | Age: 83
Discharge: HOME | End: 2023-12-22
Payer: MEDICARE

## 2023-12-22 DIAGNOSIS — I48.0 PAROXYSMAL ATRIAL FIBRILLATION: ICD-10-CM

## 2023-12-22 LAB
QT INTERVAL: 454 MS
QTC INTERVAL: 413 MS

## 2023-12-22 PROCEDURE — 93005 ELECTROCARDIOGRAM TRACING: CPT | Performed by: INTERNAL MEDICINE

## 2023-12-29 ENCOUNTER — OFFICE VISIT (OUTPATIENT)
Dept: CARDIOLOGY | Facility: CLINIC | Age: 83
End: 2023-12-29
Payer: MEDICARE

## 2023-12-29 VITALS
OXYGEN SATURATION: 96 % | DIASTOLIC BLOOD PRESSURE: 80 MMHG | WEIGHT: 178.2 LBS | BODY MASS INDEX: 25.51 KG/M2 | HEIGHT: 70 IN | HEART RATE: 64 BPM | SYSTOLIC BLOOD PRESSURE: 142 MMHG

## 2023-12-29 DIAGNOSIS — I48.0 PAROXYSMAL ATRIAL FIBRILLATION: Primary | ICD-10-CM

## 2023-12-29 DIAGNOSIS — I77.1 SUBCLAVIAN ARTERIAL STENOSIS: ICD-10-CM

## 2023-12-29 DIAGNOSIS — I49.5 SINUS NODE DYSFUNCTION: ICD-10-CM

## 2023-12-29 DIAGNOSIS — I10 ESSENTIAL HYPERTENSION: ICD-10-CM

## 2023-12-29 RX ORDER — AMLODIPINE BESYLATE 5 MG/1
5 TABLET ORAL DAILY
Qty: 30 TABLET | Refills: 11 | Status: SHIPPED | OUTPATIENT
Start: 2023-12-29

## 2023-12-29 RX ORDER — CHOLECALCIFEROL (VITAMIN D3) 125 MCG
5 CAPSULE ORAL NIGHTLY PRN
COMMUNITY

## 2023-12-29 RX ORDER — ASPIRIN 81 MG/1
81 TABLET ORAL 2 TIMES DAILY
COMMUNITY

## 2023-12-29 RX ORDER — MULTIPLE VITAMINS W/ MINERALS TAB 9MG-400MCG
1 TAB ORAL DAILY
COMMUNITY

## 2023-12-30 NOTE — PROGRESS NOTES
Cardiac Electrophysiology Outpatient Note  [facility]    Office Visit     Henry Olea  [id number]  12/29/2023    Primary Care Physician: Jimmy Zhang MD    Referred By: Sinan Nam MD    Subjective     Chief Complaint   Patient presents with    Atrial Fibrillation       History of Present Illness:   Henry Olea is a 83 y.o. male who presents to my electrophysiology clinic for evaluation of paroxysmal atrial fibrillation.  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  He was first diagnosed with atrial fibrillation in 2021.  He did have symptoms with this at that time.  He was started on flecainide and has had very good control with this.  He does not believe he has had any recent episodes of atrial fibrillation.  He does have some baseline bradycardia and conduction system abnormalities.  Therefore his flecainide his been decreased from 100 mg to 50 mg and now to 25 mg.  He continues to not have any atrial fibrillation on this dose.  He previously was taking Eliquis but was having hemorrhoidal bleeding and stopped.    Currently he is overall feeling quite well.  He denies any major complaints.  As above he has not had any symptomatic atrial fibrillation in some time.  Denies any significant presyncope or syncope.  He is able to do all of his activities of daily living without significant issues.  Has checked his blood pressure at home and noted it is been persistently high.  He is also noted a 10 to  mmHg difference in his right arm left arm (the left arm being lower).  Past Medical History:   Diagnosis Date    Pre-diabetes        Past Surgical History:   Procedure Laterality Date    BACK SURGERY  2004 2012    COLON RESECTION      HUMERUS FRACTURE SURGERY      ROTATOR CUFF REPAIR Right 2000    SKIN CANCER EXCISION  2010    Melanoma resection       Family History   Problem Relation Age of Onset    Diverticulitis Mother  "    Rheum arthritis Mother     Diabetes Mother     Lung cancer Father     Heart disease Father     Heart disease Brother        Social History     Socioeconomic History    Marital status:    Tobacco Use    Smoking status: Former     Types: Cigarettes     Quit date:      Years since quittin.0     Passive exposure: Past    Smokeless tobacco: Never   Vaping Use    Vaping Use: Never used   Substance and Sexual Activity    Alcohol use: Yes     Comment: 3 oz     Drug use: No    Sexual activity: Defer         Current Outpatient Medications:     aspirin 81 MG EC tablet, Take 1 tablet by mouth 2 (Two) Times a Day., Disp: , Rfl:     celecoxib (CeleBREX) 200 MG capsule, Take 1 capsule by mouth 2 (Two) Times a Day As Needed., Disp: 180 capsule, Rfl: 5    finasteride (PROSCAR) 5 MG tablet, Take 1 tablet by mouth Daily., Disp: 30 tablet, Rfl: 5    flecainide (TAMBOCOR) 50 MG tablet, Take 1 tablet by mouth 2 (Two) Times a Day. (Patient taking differently: Take 0.5 tablets by mouth 2 (Two) Times a Day.), Disp: 180 tablet, Rfl: 2    GLUCOSAMINE-CHONDROITIN PO, Take 1 tablet by mouth Daily., Disp: , Rfl:     melatonin 5 MG tablet tablet, Take 1 tablet by mouth At Night As Needed., Disp: , Rfl:     multivitamin with minerals (MULTIVITAMIN ADULTS 50+ PO), Take 1 tablet by mouth Daily., Disp: , Rfl:     Omega-3 Fatty Acids (fish oil) 1000 MG capsule capsule, Take 1 capsule by mouth Daily With Breakfast., Disp: , Rfl:     pantoprazole (Protonix) 40 MG EC tablet, Take 1 tablet by mouth Daily., Disp: 90 tablet, Rfl: 4    vitamin C (ASCORBIC ACID) 250 MG tablet, Take 1 tablet by mouth Daily., Disp: , Rfl:     amLODIPine (NORVASC) 5 MG tablet, Take 1 tablet by mouth Daily., Disp: 30 tablet, Rfl: 11    Allergies:   No Known Allergies    Objective   Vital Signs: Blood pressure 142/80, pulse 64, height 177.8 cm (70\"), weight 80.8 kg (178 lb 3.2 oz), SpO2 96%.    PHYSICAL EXAM  General appearance: Awake, alert, cooperative  Head: " "Normocephalic, without obvious abnormality, atraumatic  Neck: No JVD  Lungs: Clear to ascultation bilaterally  Heart: Regular rate and rhythm, no murmurs, 2+ LE pulses, no lower extremity swelling.  The left radial pulse does appear to be slightly weaker than the right radial pulse.  Skin: Skin color, turgor normal, no rashes or lesions  Neurologic: Grossly normal     Lab Results   Component Value Date    GLUCOSE 110 (H) 08/08/2023    CALCIUM 9.9 08/08/2023     08/08/2023    K 5.0 08/08/2023    CO2 27.2 08/08/2023     08/08/2023    BUN 27 (H) 08/08/2023    CREATININE 0.70 12/07/2023    EGFRIFNONA 92 09/10/2021    BCR 28.1 (H) 08/08/2023    ANIONGAP 10.8 08/08/2023     Lab Results   Component Value Date    WBC 5.71 05/17/2023    HGB 13.4 05/17/2023    HCT 40.8 05/17/2023    MCV 95.3 05/17/2023     05/17/2023     No results found for: \"INR\", \"PROTIME\"  Lab Results   Component Value Date    TSH 4.320 (H) 05/17/2023    D1AYLFL 5.55 05/09/2021          Results for orders placed during the hospital encounter of 09/22/21    Adult Transthoracic Echo Complete w/ Color, Spectral and Contrast if necessary per protocol    Interpretation Summary  · Normal left ventricular cavity size and wall thickness noted. All left ventricular wall segments contract normally.  · Left ventricular ejection fraction appears to be 56 - 60%.  · Left ventricular diastolic function is consistent with (grade I) impaired relaxation.  · The aortic valve is abnormal in structure. The aortic valve exhibits sclerosis. The aortic valve appears trileaflet. Trace aortic valve regurgitation is present. No aortic valve stenosis is present.  · The mitral valve is structurally normal with no significant stenosis present. Trace mitral valve regurgitation is present.  · There is no evidence of pericardial effusion.         I personally viewed and interpreted the patient's EKG/Telemetry/lab data      ECG 12 Lead    Date/Time: 12/30/2023 10:03 " AM  Performed by: Fransisco Richardson MD    Authorized by: Fransisco Richardson MD  Comparison: compared with previous ECG from 11/16/2023  Similar to previous ECG  Comments: Sinus bradycardia, left axis deviation, LVH with repolarization abnormalities          Henry Olea  reports that he quit smoking about 44 years ago. His smoking use included cigarettes. He has been exposed to tobacco smoke. He has never used smokeless tobacco..  Advance Care Planning   Advance Care Planning: ACP discussion was held with the patient during this visit. Patient has an advance directive in EMR which is still valid.      Assessment & Plan    1. Paroxysmal atrial fibrillation  Patient has a history of paroxysmal atrial fibrillation.  He was previously on a higher dose of flecainide but did have some progressive conduction system changes on his EKG.  EKG today on the lower dose of flecainide shows resolution of right bundle branch block pattern.  He does have good control of his atrial fibrillation currently on this low-dose.  Therefore think it is reasonable to continue this for now.  I would not be comfortable reescalating his dose due to his evidence of conduction system disease.  If he were to have recurrences of atrial fibrillation we discussed the option of switching to another antiarrhythmic such as Tikosyn or even considering a catheter ablation.  Despite his age, he is overall relatively healthy and I still think he could be a candidate for an ablation, albeit with slightly higher risks.    He previously was taking Eliquis but stopped this due to GI bleeds.  His [id number] score is 3.  Do think he would benefit from long-term anticoagulation.  Uncertain what his current burden of atrial fibrillation is.  If he truly is not having any recurrent atrial fibrillation he would likely be at a lower risk of stroke.  This may be something that we can reassess in the future with either an ambulatory monitor or an implantable loop  recorder.  Should he have significant amount of atrial fibrillation and not be able to tolerate anticoagulation, we could also consider Watchman device implantation.    2. Sinus node dysfunction  Patient does have evidence of sinus node dysfunction with mild baseline sinus bradycardia.  At the current time he does not appear to have any symptoms with this.  Also as above he has mild evidence of conduction system disease, but at the current time no high degree heart block.  No indication for permanent pacing at the current time, but will need to monitor this closely going forward.    3. Essential hypertension  I reviewed the patient's blood pressure log which do show significantly high blood pressures.  He consistently had blood pressures in the 160s to 170s.  He is agreeable to start antihypertensive therapy.  Although he would likely qualify for starting a combination pill, due to his age we will start more conservatively with single therapy of amlodipine.  Will start with 5 mg daily and see how he does with this.  He continue to check his blood pressure at home to see if we need to add any additional agents.    4. Subclavian arterial stenosis  Blood pressure log does note 10 to 20 mmHg difference between his left and right arms.  We discussed that he likely has some degree of subclavian arterial stenosis.  He does not have any symptoms related to this at the current time.  Therefore I do not believe that we need to pursue any further workup at the current time.       Follow Up:  Return in about 6 months (around 6/29/2024).      Thank you for allowing me to participate in the care of your patient. Please do not hesitate to contact me with additional questions or concerns.      Fransisco Richardson M.D.  Cardiac Electrophysiologist  [facility]

## 2024-02-09 ENCOUNTER — TELEPHONE (OUTPATIENT)
Dept: CARDIOLOGY | Facility: CLINIC | Age: 84
End: 2024-02-09

## 2024-02-09 NOTE — TELEPHONE ENCOUNTER
Caller: Henry Olea    Relationship: Self    Best call back number: [phone]     What was the call regarding: PT HAD A FOLLOW APPT ON 2/22 AND DUE TO UNFORSEEN CIRCUMSTANCES, THEY HAD TO RESCHEDULE. PER PT PREFERENCE (PTS SCHD)  WE GOT THEM SCHEDULED FOR 4/25. JUST WANTED TO MAKE OFFICE AWARE.    REQUESTING A SOONER APPT ON A MON TUES OR THUS IF AT ALL AVAILABLE IN LATE AFTERNOON

## 2024-02-16 ENCOUNTER — TRANSCRIBE ORDERS (OUTPATIENT)
Dept: ADMINISTRATIVE | Facility: HOSPITAL | Age: 84
End: 2024-02-16
Payer: MEDICARE

## 2024-02-16 ENCOUNTER — LAB (OUTPATIENT)
Dept: LAB | Facility: HOSPITAL | Age: 84
End: 2024-02-16
Payer: MEDICARE

## 2024-02-16 DIAGNOSIS — E78.2 MIXED HYPERLIPIDEMIA: ICD-10-CM

## 2024-02-16 DIAGNOSIS — I48.0 PAROXYSMAL ATRIAL FIBRILLATION: ICD-10-CM

## 2024-02-16 DIAGNOSIS — E11.9 DIABETES MELLITUS WITHOUT COMPLICATION: ICD-10-CM

## 2024-02-16 DIAGNOSIS — E55.9 VITAMIN D DEFICIENCY: ICD-10-CM

## 2024-02-16 DIAGNOSIS — D64.9 ANEMIA, UNSPECIFIED TYPE: Primary | ICD-10-CM

## 2024-02-16 DIAGNOSIS — E55.9 AVITAMINOSIS D: ICD-10-CM

## 2024-02-16 DIAGNOSIS — D64.9 ANEMIA, UNSPECIFIED TYPE: ICD-10-CM

## 2024-02-16 LAB
25(OH)D3 SERPL-MCNC: 25.8 NG/ML (ref 30–100)
ALBUMIN SERPL-MCNC: 4.3 G/DL (ref 3.5–5.2)
ALBUMIN/GLOB SERPL: 1.5 G/DL
ALP SERPL-CCNC: 72 U/L (ref 39–117)
ALT SERPL W P-5'-P-CCNC: 17 U/L (ref 1–41)
ANION GAP SERPL CALCULATED.3IONS-SCNC: 7.7 MMOL/L (ref 5–15)
AST SERPL-CCNC: 16 U/L (ref 1–40)
BASOPHILS # BLD AUTO: 0.08 10*3/MM3 (ref 0–0.2)
BASOPHILS NFR BLD AUTO: 1.4 % (ref 0–1.5)
BILIRUB SERPL-MCNC: 0.5 MG/DL (ref 0–1.2)
BUN SERPL-MCNC: 26 MG/DL (ref 8–23)
BUN/CREAT SERPL: 34.7 (ref 7–25)
CALCIUM SPEC-SCNC: 9.5 MG/DL (ref 8.6–10.5)
CHLORIDE SERPL-SCNC: 106 MMOL/L (ref 98–107)
CHOLEST SERPL-MCNC: 170 MG/DL (ref 0–200)
CO2 SERPL-SCNC: 27.3 MMOL/L (ref 22–29)
CREAT SERPL-MCNC: 0.75 MG/DL (ref 0.76–1.27)
CRP SERPL-MCNC: <0.3 MG/DL (ref 0–0.5)
DEPRECATED RDW RBC AUTO: 45.5 FL (ref 37–54)
EGFRCR SERPLBLD CKD-EPI 2021: 89.5 ML/MIN/1.73
EOSINOPHIL # BLD AUTO: 0.38 10*3/MM3 (ref 0–0.4)
EOSINOPHIL NFR BLD AUTO: 6.6 % (ref 0.3–6.2)
ERYTHROCYTE [DISTWIDTH] IN BLOOD BY AUTOMATED COUNT: 12.7 % (ref 12.3–15.4)
ERYTHROCYTE [SEDIMENTATION RATE] IN BLOOD: 9 MM/HR (ref 0–20)
FOLATE SERPL-MCNC: 19.2 NG/ML (ref 4.78–24.2)
GLOBULIN UR ELPH-MCNC: 2.8 GM/DL
GLUCOSE SERPL-MCNC: 126 MG/DL (ref 65–99)
HBA1C MFR BLD: 6.2 % (ref 4.8–5.6)
HCT VFR BLD AUTO: 41.9 % (ref 37.5–51)
HDLC SERPL-MCNC: 76 MG/DL (ref 40–60)
HGB BLD-MCNC: 13.5 G/DL (ref 13–17.7)
IMM GRANULOCYTES # BLD AUTO: 0.01 10*3/MM3 (ref 0–0.05)
IMM GRANULOCYTES NFR BLD AUTO: 0.2 % (ref 0–0.5)
LDLC SERPL CALC-MCNC: 84 MG/DL (ref 0–100)
LDLC/HDLC SERPL: 1.11 {RATIO}
LYMPHOCYTES # BLD AUTO: 2.2 10*3/MM3 (ref 0.7–3.1)
LYMPHOCYTES NFR BLD AUTO: 37.9 % (ref 19.6–45.3)
MCH RBC QN AUTO: 31.1 PG (ref 26.6–33)
MCHC RBC AUTO-ENTMCNC: 32.2 G/DL (ref 31.5–35.7)
MCV RBC AUTO: 96.5 FL (ref 79–97)
MONOCYTES # BLD AUTO: 0.57 10*3/MM3 (ref 0.1–0.9)
MONOCYTES NFR BLD AUTO: 9.8 % (ref 5–12)
NEUTROPHILS NFR BLD AUTO: 2.56 10*3/MM3 (ref 1.7–7)
NEUTROPHILS NFR BLD AUTO: 44.1 % (ref 42.7–76)
NRBC BLD AUTO-RTO: 0 /100 WBC (ref 0–0.2)
PLATELET # BLD AUTO: 322 10*3/MM3 (ref 140–450)
PMV BLD AUTO: 9.4 FL (ref 6–12)
POTASSIUM SERPL-SCNC: 4.5 MMOL/L (ref 3.5–5.2)
PROT SERPL-MCNC: 7.1 G/DL (ref 6–8.5)
RBC # BLD AUTO: 4.34 10*6/MM3 (ref 4.14–5.8)
SODIUM SERPL-SCNC: 141 MMOL/L (ref 136–145)
T-UPTAKE NFR SERPL: 0.91 TBI (ref 0.8–1.3)
T4 SERPL-MCNC: 6.79 MCG/DL (ref 4.5–11.7)
TRIGL SERPL-MCNC: 47 MG/DL (ref 0–150)
TSH SERPL DL<=0.05 MIU/L-ACNC: 4.61 UIU/ML (ref 0.27–4.2)
URATE SERPL-MCNC: 4 MG/DL (ref 3.4–7)
VIT B12 BLD-MCNC: 437 PG/ML (ref 211–946)
VLDLC SERPL-MCNC: 10 MG/DL (ref 5–40)
WBC NRBC COR # BLD AUTO: 5.8 10*3/MM3 (ref 3.4–10.8)

## 2024-02-16 PROCEDURE — 82607 VITAMIN B-12: CPT

## 2024-02-16 PROCEDURE — 84550 ASSAY OF BLOOD/URIC ACID: CPT

## 2024-02-16 PROCEDURE — 85025 COMPLETE CBC W/AUTO DIFF WBC: CPT

## 2024-02-16 PROCEDURE — 84436 ASSAY OF TOTAL THYROXINE: CPT

## 2024-02-16 PROCEDURE — 85652 RBC SED RATE AUTOMATED: CPT

## 2024-02-16 PROCEDURE — 80053 COMPREHEN METABOLIC PANEL: CPT

## 2024-02-16 PROCEDURE — 36415 COLL VENOUS BLD VENIPUNCTURE: CPT

## 2024-02-16 PROCEDURE — 84443 ASSAY THYROID STIM HORMONE: CPT

## 2024-02-16 PROCEDURE — 83036 HEMOGLOBIN GLYCOSYLATED A1C: CPT

## 2024-02-16 PROCEDURE — 80061 LIPID PANEL: CPT

## 2024-02-16 PROCEDURE — 84153 ASSAY OF PSA TOTAL: CPT

## 2024-02-16 PROCEDURE — 84154 ASSAY OF PSA FREE: CPT

## 2024-02-16 PROCEDURE — 82746 ASSAY OF FOLIC ACID SERUM: CPT

## 2024-02-16 PROCEDURE — 86140 C-REACTIVE PROTEIN: CPT

## 2024-02-16 PROCEDURE — 84479 ASSAY OF THYROID (T3 OR T4): CPT

## 2024-02-16 PROCEDURE — 82306 VITAMIN D 25 HYDROXY: CPT

## 2024-02-17 LAB
PSA FREE MFR SERPL: 8.6 %
PSA FREE SERPL-MCNC: 0.48 NG/ML
PSA SERPL-MCNC: 5.6 NG/ML (ref 0–4)
REFLEX: ABNORMAL

## 2024-02-19 DIAGNOSIS — D48.5 NEOPLASM OF UNCERTAIN BEHAVIOR OF SKIN: ICD-10-CM

## 2024-02-19 RX ORDER — FINASTERIDE 5 MG/1
5 TABLET, FILM COATED ORAL DAILY
Qty: 30 TABLET | Refills: 5 | Status: SHIPPED | OUTPATIENT
Start: 2024-02-19

## 2024-03-01 ENCOUNTER — TRANSCRIBE ORDERS (OUTPATIENT)
Dept: ADMINISTRATIVE | Facility: HOSPITAL | Age: 84
End: 2024-03-01
Payer: MEDICARE

## 2024-03-01 DIAGNOSIS — M48.061 DEGENERATIVE LUMBAR SPINAL STENOSIS: Primary | ICD-10-CM

## 2024-03-28 ENCOUNTER — HOSPITAL ENCOUNTER (OUTPATIENT)
Dept: MRI IMAGING | Facility: HOSPITAL | Age: 84
Discharge: HOME | End: 2024-03-28
Admitting: INTERNAL MEDICINE
Payer: MEDICARE

## 2024-03-28 DIAGNOSIS — M48.061 DEGENERATIVE LUMBAR SPINAL STENOSIS: ICD-10-CM

## 2024-03-28 PROCEDURE — 0 GADOBENATE DIMEGLUMINE 529 MG/ML SOLUTION: Performed by: INTERNAL MEDICINE

## 2024-03-28 PROCEDURE — A9577 INJ MULTIHANCE: HCPCS | Performed by: INTERNAL MEDICINE

## 2024-03-28 PROCEDURE — 72149 MRI LUMBAR SPINE W/DYE: CPT

## 2024-03-28 RX ADMIN — GADOBENATE DIMEGLUMINE 16 ML: 529 INJECTION, SOLUTION INTRAVENOUS at 08:55

## 2024-04-01 ENCOUNTER — OFFICE VISIT (OUTPATIENT)
Dept: ORTHOPEDIC SURGERY | Facility: CLINIC | Age: 84
End: 2024-04-01
Payer: MEDICARE

## 2024-04-01 VITALS
HEIGHT: 70 IN | TEMPERATURE: 98.6 F | OXYGEN SATURATION: 98 % | RESPIRATION RATE: 18 BRPM | SYSTOLIC BLOOD PRESSURE: 126 MMHG | WEIGHT: 178 LBS | DIASTOLIC BLOOD PRESSURE: 72 MMHG | BODY MASS INDEX: 25.48 KG/M2 | HEART RATE: 64 BPM

## 2024-04-01 DIAGNOSIS — G89.29 CHRONIC RIGHT-SIDED LOW BACK PAIN WITHOUT SCIATICA: Primary | ICD-10-CM

## 2024-04-01 DIAGNOSIS — M54.50 CHRONIC RIGHT-SIDED LOW BACK PAIN WITHOUT SCIATICA: Primary | ICD-10-CM

## 2024-04-01 PROCEDURE — 99213 OFFICE O/P EST LOW 20 MIN: CPT | Performed by: ORTHOPAEDIC SURGERY

## 2024-04-01 NOTE — PROGRESS NOTES
Follow-up Visit         Patient: Henry Olea  YOB: 1940  Date of Encounter: 2024      Chief  Complaint:   Chief Complaint   Patient presents with    Lumbar Spine - Pain, Initial Evaluation         HPI:  Henry Olea, 83 y.o. male presents evaluation of lateral right leg pain over the past year.  He experiences pain on a daily basis last around 15 minutes he has to stop his activity for relief.  He describes low back pain of 30 years duration he has undergone 2 previous procedures the first was 20 years ago and his second was about 10 years ago by description he describes laminectomy.  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  He did not receive instrumentation.  Remains very active plays tennis and pickleball.  He does not experience numbness of his feet.  His symptoms are entirely right-sided and radiate from his lateral thigh to his lateral calf.  Reviewing his medical records he underwent laminectomy at the L4 level.  He gives no history of trauma as a precipitating event.        Medical History:  Patient Active Problem List   Diagnosis    Skin lesion    Suture check    Neoplasm of uncertain behavior of skin    Benign prostatic hyperplasia with urinary hesitancy     Past Medical History:   Diagnosis Date    Pre-diabetes            Social History:  Social History     Socioeconomic History    Marital status:    Tobacco Use    Smoking status: Former     Current packs/day: 0.00     Types: Cigarettes     Quit date:      Years since quittin.2     Passive exposure: Past    Smokeless tobacco: Never   Vaping Use    Vaping status: Never Used   Substance and Sexual Activity    Alcohol use: Yes     Comment: 3 oz     Drug use: No    Sexual activity: Defer     Vitals:    24 1401   BP: 126/72   BP Location: Right arm   Patient Position: Sitting   Cuff Size: Adult   Pulse: 64   Resp: 18   Temp: 98.6  "°F (37 °C)   TempSrc: Temporal   SpO2: 98%   Weight: 80.7 kg (178 lb)   Height: 177.8 cm (70\")   Body mass index is 25.54 kg/m².          Current Medications:    Current Outpatient Medications:     amLODIPine (NORVASC) 5 MG tablet, Take 1 tablet by mouth Daily., Disp: 30 tablet, Rfl: 11    aspirin 81 MG EC tablet, Take 1 tablet by mouth 2 (Two) Times a Day., Disp: , Rfl:     celecoxib (CeleBREX) 200 MG capsule, Take 1 capsule by mouth 2 (Two) Times a Day As Needed., Disp: 180 capsule, Rfl: 5    finasteride (PROSCAR) 5 MG tablet, Take 1 tablet by mouth Daily., Disp: 30 tablet, Rfl: 5    flecainide (TAMBOCOR) 50 MG tablet, Take 1 tablet by mouth 2 (Two) Times a Day. (Patient taking differently: Take 0.5 tablets by mouth 2 (Two) Times a Day.), Disp: 180 tablet, Rfl: 2    GLUCOSAMINE-CHONDROITIN PO, Take 1 tablet by mouth Daily., Disp: , Rfl:     ibuprofen (ADVIL,MOTRIN) 600 MG tablet, Take 1/2-1 tablet by mouth Daily As Needed., Disp: 100 tablet, Rfl: 1    melatonin 5 MG tablet tablet, Take 1 tablet by mouth At Night As Needed., Disp: , Rfl:     multivitamin with minerals (MULTIVITAMIN ADULTS 50+ PO), Take 1 tablet by mouth Daily., Disp: , Rfl:     Omega-3 Fatty Acids (fish oil) 1000 MG capsule capsule, Take 1 capsule by mouth Daily With Breakfast., Disp: , Rfl:     pantoprazole (Protonix) 40 MG EC tablet, Take 1 tablet by mouth Daily., Disp: 90 tablet, Rfl: 4    vitamin C (ASCORBIC ACID) 250 MG tablet, Take 1 tablet by mouth Daily., Disp: , Rfl:     amLODIPine (NORVASC) 5 MG tablet, Take 1 tablet by mouth Daily. (Patient not taking: Reported on 4/1/2024), Disp: 90 tablet, Rfl: 3    finasteride (PROSCAR) 5 MG tablet, Take 1 tablet by mouth Daily. (Patient not taking: Reported on 4/1/2024), Disp: 90 tablet, Rfl: 3    tamsulosin (FLOMAX) 0.4 MG capsule 24 hr capsule, Take 1 capsule by mouth every day 1/2 hour following the same meal each day. (Patient not taking: Reported on 4/1/2024), Disp: 90 capsule, Rfl: 3    " triamcinolone (KENALOG) 0.1 % paste, Apply a small amount to the affected area after meals 2- 3 times every day. (Patient not taking: Reported on 4/1/2024), Disp: 5 g, Rfl: 1        Allergies:  No Known Allergies        Family History:  Family History   Problem Relation Age of Onset    Diverticulitis Mother     Rheum arthritis Mother     Diabetes Mother     Lung cancer Father     Heart disease Father     Heart disease Brother            Surgical History:  Past Surgical History:   Procedure Laterality Date    BACK SURGERY  2004    2012    COLON RESECTION      HUMERUS FRACTURE SURGERY      ROTATOR CUFF REPAIR Right 2000    SKIN CANCER EXCISION  2010    Melanoma resection           Radiology:   MRI Lumbar Spine With Contrast    Result Date: 3/28/2024   1. Severe multilevel degenerative disc disease in combination with hypertrophic facet arthropathy and multilevel spondylolisthesis resulting in diffuse spinal stenosis at levels detailed above. 2. Anterolisthesis L4 on L5, 10 mm. 3. Laminectomy changes L4/5. 4. No acute fracture identified. No acute appearing disc herniations noted.   This report was finalized on 3/28/2024 10:22 AM by Dr. Дмитрий Guzman MD.       MRI lumbar spine reviewed with findings confirmed with greatest involvement at the L4-5 level with significant spinal stenosis and bilateral foraminal encroachment right greater than left.    Radiographs          Orthopedic Examination: Bilateral lower extremities reveals active motion with flexion extension bilateral knees without pain hip mobility is slightly limited leg lengths are equal straight leg raising is negative AUDI test is negative bilaterally neurovascular exam grossly intact.          Assessment & Plan:   83 y.o. male presents lachrymal right leg pain his findings are consistent with radiculopathy.  He has advanced joints of his lumbar spine with autofusion and is consistent with L4 radiculopathy.  His records indicate that he has undergone an L4  laminectomy in the past on the right.  He has received injections by local pain clinic without relief.  I have suggested that he seek the opinion of Dr. Qing [facility] clinic.  He will take his MRI disc.  Because clinic is needed.           Diagnosis Plan   1. Chronic right-sided low back pain without sciatica  Ambulatory Referral to Neurosurgery                Cc:  Jimmy Zhang MD              This document has been electronically signed by Romel Stoner MD   April 1, 2024 16:45 EDT

## 2024-04-25 ENCOUNTER — OFFICE VISIT (OUTPATIENT)
Dept: CARDIOLOGY | Facility: CLINIC | Age: 84
End: 2024-04-25
Payer: MEDICARE

## 2024-04-25 VITALS
HEIGHT: 70 IN | WEIGHT: 176.2 LBS | HEART RATE: 48 BPM | BODY MASS INDEX: 25.22 KG/M2 | DIASTOLIC BLOOD PRESSURE: 75 MMHG | SYSTOLIC BLOOD PRESSURE: 152 MMHG

## 2024-04-25 DIAGNOSIS — I10 ESSENTIAL HYPERTENSION: ICD-10-CM

## 2024-04-25 DIAGNOSIS — I48.0 PAROXYSMAL ATRIAL FIBRILLATION: Primary | ICD-10-CM

## 2024-04-25 PROCEDURE — 93000 ELECTROCARDIOGRAM COMPLETE: CPT | Performed by: INTERNAL MEDICINE

## 2024-04-25 PROCEDURE — 99214 OFFICE O/P EST MOD 30 MIN: CPT | Performed by: INTERNAL MEDICINE

## 2024-04-25 RX ORDER — FLECAINIDE ACETATE 50 MG/1
25 TABLET ORAL 2 TIMES DAILY
Qty: 30 TABLET | Refills: 5 | Status: SHIPPED | OUTPATIENT
Start: 2024-04-25

## 2024-04-25 RX ORDER — AMLODIPINE BESYLATE 10 MG/1
10 TABLET ORAL DAILY
Qty: 90 TABLET | Refills: 2 | Status: SHIPPED | OUTPATIENT
Start: 2024-04-25

## 2024-04-25 NOTE — LETTER
April 26, 2024     Jimmy Zhang MD  [address] [place] [address]    Patient: Henry Olea   YOB: 1940   Date of Visit: 4/25/2024     Dear Rafi:       Thank you for referring Henry Olea to me for evaluation. Below are the relevant portions of my assessment and plan of care.    If you have questions, please do not hesitate to call me. I look forward to following Henry along with you.         Sincerely,        Sinan Nam MD        CC: No Recipients    Sinan Nam MD  04/26/24 1356  Sign when Signing Visit  Jimmy Zhang MD  Henry Olea  1940  04/25/2024    Patient Active Problem List   Diagnosis   • Skin lesion   • Suture check   • Neoplasm of uncertain behavior of skin   • Benign prostatic hyperplasia with urinary hesitancy       Dear Rafi:    Subjective     Henry Olea is a 83 y.o. male with the problems as listed above, presents    Chief complaint: Follow-up of paroxysmal atrial fibrillation.      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 twice daily.  He was previously on Eliquis but has voluntarily decided not to take it to avoid the risk of bleeding despite being told about the risk of stroke from atrial fibrillation without anticoagulation.  He was evaluated by Dr. Richardson (electrophysiologist) in [place] recently who has agreed with the continuation of flecainide with an alternate option of switching to Tikosyn or consideration of RF ablation if the breakthrough the flecainide therapy with recurrent atrial fibrillation.      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  He denies any strokelike symptoms.  He is still fairly active.  He brought blood pressure readings from home which I reviewed and his systolic blood pressure seems to be running a little bit high  in the 140s to 160s with a normal diastolic blood pressure in the 70s to 80s.  His pulse rate has been in the high 40s and low to mid 50s.    No Known Allergies:    Current Outpatient Medications:   •  amLODIPine (NORVASC) 10 MG tablet, Take 1 tablet by mouth Daily., Disp: 90 tablet, Rfl: 2  •  aspirin 81 MG EC tablet, Take 1 tablet by mouth 2 (Two) Times a Day., Disp: , Rfl:   •  celecoxib (CeleBREX) 200 MG capsule, Take 1 capsule by mouth 2 (Two) Times a Day As Needed., Disp: 180 capsule, Rfl: 5  •  finasteride (PROSCAR) 5 MG tablet, Take 1 tablet by mouth Daily., Disp: 30 tablet, Rfl: 5  •  finasteride (PROSCAR) 5 MG tablet, Take 1 tablet by mouth Daily., Disp: 90 tablet, Rfl: 3  •  GLUCOSAMINE-CHONDROITIN PO, Take 1 tablet by mouth Daily., Disp: , Rfl:   •  ibuprofen (ADVIL,MOTRIN) 600 MG tablet, Take 1/2-1 tablet by mouth Daily As Needed., Disp: 100 tablet, Rfl: 1  •  melatonin 5 MG tablet tablet, Take 1 tablet by mouth At Night As Needed., Disp: , Rfl:   •  multivitamin with minerals (MULTIVITAMIN ADULTS 50+ PO), Take 1 tablet by mouth Daily., Disp: , Rfl:   •  Omega-3 Fatty Acids (fish oil) 1000 MG capsule capsule, Take 1 capsule by mouth Daily With Breakfast., Disp: , Rfl:   •  pantoprazole (Protonix) 40 MG EC tablet, Take 1 tablet by mouth Daily., Disp: 90 tablet, Rfl: 4  •  tamsulosin (FLOMAX) 0.4 MG capsule 24 hr capsule, Take 1 capsule by mouth every day 1/2 hour following the same meal each day., Disp: 90 capsule, Rfl: 3  •  triamcinolone (KENALOG) 0.1 % paste, Apply a small amount to the affected area after meals 2- 3 times every day., Disp: 5 g, Rfl: 1  •  vitamin C (ASCORBIC ACID) 250 MG tablet, Take 1 tablet by mouth Daily., Disp: , Rfl:   •  flecainide (TAMBOCOR) 50 MG tablet, Take 0.5 tablets by mouth 2 (Two) Times a Day., Disp: 30 tablet, Rfl: 5    The following portions of the patient's history were reviewed and updated as appropriate: allergies, current medications, past family history, past  "medical history, past social history, past surgical history and problem list.    Social History     Tobacco Use   • Smoking status: Former     Current packs/day: 0.00     Types: Cigarettes     Quit date: 1980     Years since quittin.3     Passive exposure: Past   • Smokeless tobacco: Never   Vaping Use   • Vaping status: Never Used   Substance Use Topics   • Alcohol use: Yes     Comment: 3 oz    • Drug use: No     Review of Systems   Constitutional: Negative for chills and fever.   HENT:  Negative for nosebleeds and sore throat.    Respiratory:  Negative for cough, hemoptysis and wheezing.    Gastrointestinal:  Negative for abdominal pain, hematemesis, hematochezia, melena, nausea and vomiting.   Genitourinary:  Negative for dysuria and hematuria.   Neurological:  Negative for headaches.     Objective   Vitals:    24 1532   BP: 152/75   Pulse: (!) 48   Weight: 79.9 kg (176 lb 3.2 oz)   Height: 177.8 cm (70\")     Body mass index is 25.28 kg/m².    Vitals reviewed.   Constitutional:       Appearance: Well-developed.   Eyes:      Conjunctiva/sclera: Conjunctivae normal.   HENT:      Head: Normocephalic.   Neck:      Thyroid: No thyromegaly.      Vascular: No JVD.      Trachea: No tracheal deviation.   Pulmonary:      Effort: No respiratory distress.      Breath sounds: Normal breath sounds. No wheezing. No rales.   Cardiovascular:      PMI at left midclavicular line. Bradycardia present. Regular rhythm. Normal S1. Normal S2.       Murmurs: There is no murmur.      No gallop.  No click. No rub.   Pulses:     Intact distal pulses.   Edema:     Peripheral edema absent.   Abdominal:      General: Bowel sounds are normal.      Palpations: Abdomen is soft. There is no abdominal mass.      Tenderness: There is no abdominal tenderness.   Musculoskeletal:      Cervical back: Normal range of motion and neck supple. Skin:     General: Skin is warm and dry.   Neurological:      Mental Status: Alert and oriented to " "person, place, and time.      Cranial Nerves: No cranial nerve deficit.         Lab Results   Component Value Date     02/16/2024    K 4.5 02/16/2024     02/16/2024    CO2 27.3 02/16/2024    BUN 26 (H) 02/16/2024    CREATININE 0.75 (L) 02/16/2024    GLUCOSE 126 (H) 02/16/2024    CALCIUM 9.5 02/16/2024    AST 16 02/16/2024    ALT 17 02/16/2024    ALKPHOS 72 02/16/2024    LABIL2 1.6 06/03/2016     No results found for: \"CKTOTAL\"  Lab Results   Component Value Date    WBC 5.80 02/16/2024    HGB 13.5 02/16/2024    HCT 41.9 02/16/2024     02/16/2024     No results found for: \"INR\"  Lab Results   Component Value Date    MG 2.3 08/08/2023     Lab Results   Component Value Date    TSH 4.610 (H) 02/16/2024    PSA 5.6 (H) 02/16/2024    CHLPL 191 06/03/2016    TRIG 47 02/16/2024    HDL 76 (H) 02/16/2024    LDL 84 02/16/2024        ECG 12 Lead    Date/Time: 4/25/2024 3:41 PM  Performed by: Sinan Nam MD    Authorized by: Sinan Nam MD  Comparison: compared with previous ECG from 12/29/2023  Similar to previous ECG  Rhythm: sinus bradycardia  BPM: 48  Conduction: left anterior fascicular block  Comments: Nonspecific intermittent conduction delay with a QRS duration of 144 ms.  QTc is 441 ms.            Assessment & Plan    Diagnosis Plan   1. Paroxysmal , maintaining sinus bradycardia (chronic and asymptomatic), clinically asymptomatic and stable on flecainide therapy.  [id number] score is 3 for his age and hypertension.        2. Essential hypertension with elevated systolic blood pressures          Recommendations  Since his systolic blood pressures are still running high in the 140s to 160s, will increase the amlodipine to 10 mg a day and continue to monitor blood pressures at home.  I have asked Dr. Olea to keep a check on his blood pressure at home twice a day and take it to the next provider's visit for medication adjustment.  He expressed understanding.  Continue with flecainide at 25 " mg p.o. twice daily since it seems to be helping to maintain sinus rhythm.  I have discussed with him about the option of using Tikosyn therapy which is neccesary if he breaksthrough flecainide therapy.  In that case he would need inpatient monitoring for 3 days for initiation and then close monitoring of the QTc as an outpatient.  I have also discussed about the option of RF ablation as recommended by Dr. Richardson if he should have recurrent atrial fibrillation on medical therapy.  He expressed understanding but certainly does not want to go through RF ablation.    Return in about 6 months (around 10/25/2024).    As always, Rafi  I appreciate very much the opportunity to participate in the cardiovascular care of your patients. Please do not hesitate to call me with any questions with regards to Henry Olea's evaluation and management.       With Best Regards,        Sinan Nam MD, [facility]    Please note that portions of this note were completed with a voice recognition program.

## 2024-04-25 NOTE — PROGRESS NOTES
Jimmy Zhang MD  Henry Olea  1940  04/25/2024    Patient Active Problem List   Diagnosis    Skin lesion    Suture check    Neoplasm of uncertain behavior of skin    Benign prostatic hyperplasia with urinary hesitancy       Dear Jimmy Zhang MD:    Subjective     Henry Olea is a 83 y.o. male with the problems as listed above, presents    Chief complaint: Follow-up of paroxysmal atrial fibrillation.      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 twice daily.  He was previously on Eliquis but has voluntarily decided not to take it to avoid the risk of bleeding despite being told about the risk of stroke from atrial fibrillation without anticoagulation.  He was evaluated by Dr. Richardson (electrophysiologist) in [facility] who has agreed with the continuation of flecainide with an alternate option of switching to Tikosyn or consideration of RF ablation if the breakthrough the flecainide therapy with recurrent atrial fibrillation.      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  He denies any strokelike symptoms.  He is still fairly active.  He brought blood pressure readings from home which I reviewed and his systolic blood pressure seems to be running a little bit high in the 140s to 160s with a normal diastolic blood pressure in the 70s to 80s.  His pulse rate has been in the high 40s and low to mid 50s.    No Known Allergies:    Current Outpatient Medications:     amLODIPine (NORVASC) 10 MG tablet, Take 1 tablet by mouth Daily., Disp: 90 tablet, Rfl: 2    aspirin 81 MG EC tablet, Take 1 tablet by mouth 2 (Two) Times a Day., Disp: , Rfl:     celecoxib (CeleBREX) 200 MG capsule, Take 1 capsule by mouth 2 (Two) Times a Day As Needed., Disp: 180 capsule, Rfl: 5    finasteride (PROSCAR) 5 MG tablet, Take 1 tablet by  mouth Daily., Disp: 30 tablet, Rfl: 5    finasteride (PROSCAR) 5 MG tablet, Take 1 tablet by mouth Daily., Disp: 90 tablet, Rfl: 3    GLUCOSAMINE-CHONDROITIN PO, Take 1 tablet by mouth Daily., Disp: , Rfl:     ibuprofen (ADVIL,MOTRIN) 600 MG tablet, Take 1/2-1 tablet by mouth Daily As Needed., Disp: 100 tablet, Rfl: 1    melatonin 5 MG tablet tablet, Take 1 tablet by mouth At Night As Needed., Disp: , Rfl:     multivitamin with minerals (MULTIVITAMIN ADULTS 50+ PO), Take 1 tablet by mouth Daily., Disp: , Rfl:     Omega-3 Fatty Acids (fish oil) 1000 MG capsule capsule, Take 1 capsule by mouth Daily With Breakfast., Disp: , Rfl:     pantoprazole (Protonix) 40 MG EC tablet, Take 1 tablet by mouth Daily., Disp: 90 tablet, Rfl: 4    tamsulosin (FLOMAX) 0.4 MG capsule 24 hr capsule, Take 1 capsule by mouth every day 1/2 hour following the same meal each day., Disp: 90 capsule, Rfl: 3    triamcinolone (KENALOG) 0.1 % paste, Apply a small amount to the affected area after meals 2- 3 times every day., Disp: 5 g, Rfl: 1    vitamin C (ASCORBIC ACID) 250 MG tablet, Take 1 tablet by mouth Daily., Disp: , Rfl:     flecainide (TAMBOCOR) 50 MG tablet, Take 0.5 tablets by mouth 2 (Two) Times a Day., Disp: 30 tablet, Rfl: 5    The following portions of the patient's history were reviewed and updated as appropriate: allergies, current medications, past family history, past medical history, past social history, past surgical history and problem list.    Social History     Tobacco Use    Smoking status: Former     Current packs/day: 0.00     Types: Cigarettes     Quit date:      Years since quittin.3     Passive exposure: Past    Smokeless tobacco: Never   Vaping Use    Vaping status: Never Used   Substance Use Topics    Alcohol use: Yes     Comment: 3 oz     Drug use: No     Review of Systems   Constitutional: Negative for chills and fever.   HENT:  Negative for nosebleeds and sore throat.    Respiratory:  Negative for cough,  "hemoptysis and wheezing.    Gastrointestinal:  Negative for abdominal pain, hematemesis, hematochezia, melena, nausea and vomiting.   Genitourinary:  Negative for dysuria and hematuria.   Neurological:  Negative for headaches.     Objective   Vitals:    04/25/24 1532   BP: 152/75   Pulse: (!) 48   Weight: 79.9 kg (176 lb 3.2 oz)   Height: 177.8 cm (70\")     Body mass index is 25.28 kg/m².    Vitals reviewed.   Constitutional:       Appearance: Well-developed.   Eyes:      Conjunctiva/sclera: Conjunctivae normal.   HENT:      Head: Normocephalic.   Neck:      Thyroid: No thyromegaly.      Vascular: No JVD.      Trachea: No tracheal deviation.   Pulmonary:      Effort: No respiratory distress.      Breath sounds: Normal breath sounds. No wheezing. No rales.   Cardiovascular:      PMI at left midclavicular line. Bradycardia present. Regular rhythm. Normal S1. Normal S2.       Murmurs: There is no murmur.      No gallop.  No click. No rub.   Pulses:     Intact distal pulses.   Edema:     Peripheral edema absent.   Abdominal:      General: Bowel sounds are normal.      Palpations: Abdomen is soft. There is no abdominal mass.      Tenderness: There is no abdominal tenderness.   Musculoskeletal:      Cervical back: Normal range of motion and neck supple. Skin:     General: Skin is warm and dry.   Neurological:      Mental Status: Alert and oriented to person, place, and time.      Cranial Nerves: No cranial nerve deficit.         Lab Results   Component Value Date     02/16/2024    K 4.5 02/16/2024     02/16/2024    CO2 27.3 02/16/2024    BUN 26 (H) 02/16/2024    CREATININE 0.75 (L) 02/16/2024    GLUCOSE 126 (H) 02/16/2024    CALCIUM 9.5 02/16/2024    AST 16 02/16/2024    ALT 17 02/16/2024    ALKPHOS 72 02/16/2024    LABIL2 1.6 06/03/2016     No results found for: \"CKTOTAL\"  Lab Results   Component Value Date    WBC 5.80 02/16/2024    HGB 13.5 02/16/2024    HCT 41.9 02/16/2024     02/16/2024     No " "results found for: \"INR\"  Lab Results   Component Value Date    MG 2.3 08/08/2023     Lab Results   Component Value Date    TSH 4.610 (H) 02/16/2024    PSA 5.6 (H) 02/16/2024    CHLPL 191 06/03/2016    TRIG 47 02/16/2024    HDL 76 (H) 02/16/2024    LDL 84 02/16/2024        ECG 12 Lead    Date/Time: 4/25/2024 3:41 PM  Performed by: Sinan Nam MD    Authorized by: Sinan Nam MD  Comparison: compared with previous ECG from 12/29/2023  Similar to previous ECG  Rhythm: sinus bradycardia  BPM: 48  Conduction: left anterior fascicular block  Comments: Nonspecific intermittent conduction delay with a QRS duration of 144 ms.  QTc is 441 ms.            Assessment & Plan    Diagnosis Plan   1. Paroxysmal , maintaining sinus bradycardia (chronic and asymptomatic), clinically asymptomatic and stable on flecainide therapy.  [id number] score is 3 for his age and hypertension.        2. Essential hypertension with elevated systolic blood pressures          Recommendations  Since his systolic blood pressures are still running high in the 140s to 160s, will increase the amlodipine to 10 mg a day and continue to monitor blood pressures at home.  I have asked Dr. Olea to keep a check on his blood pressure at home twice a day and take it to the next provider's visit for medication adjustment.  He expressed understanding.  Continue with flecainide at 25 mg p.o. twice daily since it seems to be helping to maintain sinus rhythm.  I have discussed with him about the option of using Tikosyn therapy which is neccesary if he breaksthrough flecainide therapy.  In that case he would need inpatient monitoring for 3 days for initiation and then close monitoring of the QTc as an outpatient.  I have also discussed about the option of RF ablation as recommended by Dr. Richardson if he should have recurrent atrial fibrillation on medical therapy.  He expressed understanding but certainly does not want to go through RF ablation.    Return " in about 6 months (around 10/25/2024).    As always, Jimmy Zhang MD  I appreciate very much the opportunity to participate in the cardiovascular care of your patients. Please do not hesitate to call me with any questions with regards to Henry Olea's evaluation and management.       With Best Regards,        Sinan Nam MD, [facility]    Please note that portions of this note were completed with a voice recognition program.

## 2024-04-25 NOTE — LETTER
April 26, 2024     Jimmy Zhang MD  [address] [facility] [place] [address]    Patient: Henry Olea   YOB: 1940   Date of Visit: 4/25/2024     Dear Jimmy Zhang MD:       Thank you for referring Henry Olea to me for evaluation. Below are the relevant portions of my assessment and plan of care.    If you have questions, please do not hesitate to call me. I look forward to following Henry along with you.         Sincerely,        Sinan Nam MD        CC: No Recipients    Sinan Nam MD  04/26/24 1348  Sign when Signing Visit  Jimmy Zhang MD  Henry Olea  1940  04/25/2024    Patient Active Problem List   Diagnosis    Skin lesion    Suture check    Neoplasm of uncertain behavior of skin    Benign prostatic hyperplasia with urinary hesitancy       DearSteve:    Subjective     Henry Olea is a 83 y.o. male with the problems as listed above, presents    Chief complaint: Follow-up of paroxysmal atrial fibrillation.        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 twice daily.  He was previously on Eliquis but has voluntarily decided not to take it to avoid the risk of bleeding.  He was evaluated by Dr. Richardson in [place] recently and who has agreed with the continuation of flecainide with an alternate option of switching to Tikosyn or consideration of RF ablation if the breakthrough the flecainide therapy with recurrent atrial fibrillation.  On today's visit Dr. Subramanian denies any complaints of palpitations, dizziness or syncope.  He denies any strokelike symptoms.  He is still fairly active.  He brought blood pressure readings from home which I reviewed and his systolic blood pressure seems to be running a little bit high in the 140s to 160s with a normal diastolic blood pressure in the 70s to 80s.  His pulse rate has been  in the high 40s and low to mid 50s.    No Known Allergies:    Current Outpatient Medications:     amLODIPine (NORVASC) 10 MG tablet, Take 1 tablet by mouth Daily., Disp: 90 tablet, Rfl: 2    aspirin 81 MG EC tablet, Take 1 tablet by mouth 2 (Two) Times a Day., Disp: , Rfl:     celecoxib (CeleBREX) 200 MG capsule, Take 1 capsule by mouth 2 (Two) Times a Day As Needed., Disp: 180 capsule, Rfl: 5    finasteride (PROSCAR) 5 MG tablet, Take 1 tablet by mouth Daily., Disp: 30 tablet, Rfl: 5    finasteride (PROSCAR) 5 MG tablet, Take 1 tablet by mouth Daily., Disp: 90 tablet, Rfl: 3    GLUCOSAMINE-CHONDROITIN PO, Take 1 tablet by mouth Daily., Disp: , Rfl:     ibuprofen (ADVIL,MOTRIN) 600 MG tablet, Take 1/2-1 tablet by mouth Daily As Needed., Disp: 100 tablet, Rfl: 1    melatonin 5 MG tablet tablet, Take 1 tablet by mouth At Night As Needed., Disp: , Rfl:     multivitamin with minerals (MULTIVITAMIN ADULTS 50+ PO), Take 1 tablet by mouth Daily., Disp: , Rfl:     Omega-3 Fatty Acids (fish oil) 1000 MG capsule capsule, Take 1 capsule by mouth Daily With Breakfast., Disp: , Rfl:     pantoprazole (Protonix) 40 MG EC tablet, Take 1 tablet by mouth Daily., Disp: 90 tablet, Rfl: 4    tamsulosin (FLOMAX) 0.4 MG capsule 24 hr capsule, Take 1 capsule by mouth every day 1/2 hour following the same meal each day., Disp: 90 capsule, Rfl: 3    triamcinolone (KENALOG) 0.1 % paste, Apply a small amount to the affected area after meals 2- 3 times every day., Disp: 5 g, Rfl: 1    vitamin C (ASCORBIC ACID) 250 MG tablet, Take 1 tablet by mouth Daily., Disp: , Rfl:     flecainide (TAMBOCOR) 50 MG tablet, Take 0.5 tablets by mouth 2 (Two) Times a Day., Disp: 30 tablet, Rfl: 5    The following portions of the patient's history were reviewed and updated as appropriate: allergies, current medications, past family history, past medical history, past social history, past surgical history and problem list.    Social History     Tobacco Use     "Smoking status: Former     Current packs/day: 0.00     Types: Cigarettes     Quit date:      Years since quittin.3     Passive exposure: Past    Smokeless tobacco: Never   Vaping Use    Vaping status: Never Used   Substance Use Topics    Alcohol use: Yes     Comment: 3 oz     Drug use: No     Review of Systems   Constitutional: Negative for chills and fever.   HENT:  Negative for nosebleeds and sore throat.    Respiratory:  Negative for cough, hemoptysis and wheezing.    Gastrointestinal:  Negative for abdominal pain, hematemesis, hematochezia, melena, nausea and vomiting.   Genitourinary:  Negative for dysuria and hematuria.   Neurological:  Negative for headaches.     Objective   Vitals:    24 1532   BP: 152/75   Pulse: (!) 48   Weight: 79.9 kg (176 lb 3.2 oz)   Height: 177.8 cm (70\")     Body mass index is 25.28 kg/m².    Vitals reviewed.   Constitutional:       Appearance: Well-developed.   Eyes:      Conjunctiva/sclera: Conjunctivae normal.   HENT:      Head: Normocephalic.   Neck:      Thyroid: No thyromegaly.      Vascular: No JVD.      Trachea: No tracheal deviation.   Pulmonary:      Effort: No respiratory distress.      Breath sounds: Normal breath sounds. No wheezing. No rales.   Cardiovascular:      PMI at left midclavicular line. Bradycardia present. Regular rhythm. Normal S1. Normal S2.       Murmurs: There is no murmur.      No gallop.  No click. No rub.   Pulses:     Intact distal pulses.   Edema:     Peripheral edema absent.   Abdominal:      General: Bowel sounds are normal.      Palpations: Abdomen is soft. There is no abdominal mass.      Tenderness: There is no abdominal tenderness.   Musculoskeletal:      Cervical back: Normal range of motion and neck supple. Skin:     General: Skin is warm and dry.   Neurological:      Mental Status: Alert and oriented to person, place, and time.      Cranial Nerves: No cranial nerve deficit.         Lab Results   Component Value Date     " "02/16/2024    K 4.5 02/16/2024     02/16/2024    CO2 27.3 02/16/2024    BUN 26 (H) 02/16/2024    CREATININE 0.75 (L) 02/16/2024    GLUCOSE 126 (H) 02/16/2024    CALCIUM 9.5 02/16/2024    AST 16 02/16/2024    ALT 17 02/16/2024    ALKPHOS 72 02/16/2024    LABIL2 1.6 06/03/2016     No results found for: \"CKTOTAL\"  Lab Results   Component Value Date    WBC 5.80 02/16/2024    HGB 13.5 02/16/2024    HCT 41.9 02/16/2024     02/16/2024     No results found for: \"INR\"  Lab Results   Component Value Date    MG 2.3 08/08/2023     Lab Results   Component Value Date    TSH 4.610 (H) 02/16/2024    PSA 5.6 (H) 02/16/2024    CHLPL 191 06/03/2016    TRIG 47 02/16/2024    HDL 76 (H) 02/16/2024    LDL 84 02/16/2024      No results found for: \"BNP\"    ECG 12 Lead    Date/Time: 4/25/2024 3:41 PM  Performed by: Sinan Nam MD    Authorized by: Sinan Nam MD  Comparison: compared with previous ECG from 12/29/2023  Similar to previous ECG  Rhythm: sinus bradycardia  BPM: 48  Conduction: left anterior fascicular block  Comments: Nonspecific intermittent conduction delay with a QRS duration of 144 ms.  QTc is 441 ms.        Assessment & Plan    Diagnosis Plan   1. Paroxysmal , maintaining sinus bradycardia (chronic and asymptomatic), clinically asymptomatic and stable on flecainide therapy.  [id number] score is 3 for his age and hypertension.        2. Essential hypertension with elevated systolic blood pressures          Recommendations  Since his systolic blood pressures are still running high in the 140s to 160s, will increase the amlodipine to 10 mg a day and continue to monitor blood pressures at home.  I have asked Dr. Olea to keep a check on his blood pressure at home twice a day and take it to the next provider's visit for medication adjustment.  He expressed understanding.  Continue with flecainide at 25 mg p.o. twice daily since it seems to be helping to maintain sinus rhythm.  I have discussed with him " about the option of using Tikosyn therapy which I feel breakthrough flecainide therapy.  In that case he would need inpatient monitoring for 3 days for initiation and then close monitoring of the QTc as an outpatient.  I have also discussed about the option of RF ablation as recommended by Dr. Richardson if he should have recurrent atrial fibrillation on medical therapy.  He expressed understanding but certainly does not want to go through RF ablation.    Return in about 6 months (around 10/25/2024).    As always, Jimmy Zhang MD  I appreciate very much the opportunity to participate in the cardiovascular care of your patients. Please do not hesitate to call me with any questions with regards to Henry COELHO Lefty's evaluation and management.       With Best Regards,        Sinan Nam MD, [facility]    Please note that portions of this note were completed with a voice recognition program.

## 2024-05-23 ENCOUNTER — LAB (OUTPATIENT)
Dept: LAB | Facility: HOSPITAL | Age: 84
End: 2024-05-23
Payer: MEDICARE

## 2024-05-23 ENCOUNTER — TRANSCRIBE ORDERS (OUTPATIENT)
Dept: ADMINISTRATIVE | Facility: HOSPITAL | Age: 84
End: 2024-05-23
Payer: MEDICARE

## 2024-05-23 DIAGNOSIS — R97.20 ELEVATED PSA: Primary | ICD-10-CM

## 2024-05-23 DIAGNOSIS — R97.20 ELEVATED PSA: ICD-10-CM

## 2024-05-23 LAB — PSA SERPL-MCNC: 9.57 NG/ML (ref 0–4)

## 2024-05-23 PROCEDURE — 84153 ASSAY OF PSA TOTAL: CPT

## 2024-05-23 PROCEDURE — 36415 COLL VENOUS BLD VENIPUNCTURE: CPT

## 2024-06-04 ENCOUNTER — TELEPHONE (OUTPATIENT)
Dept: UROLOGY | Facility: CLINIC | Age: 84
End: 2024-06-04
Payer: MEDICARE

## 2024-06-04 DIAGNOSIS — R39.11 BENIGN PROSTATIC HYPERPLASIA WITH URINARY HESITANCY: ICD-10-CM

## 2024-06-04 DIAGNOSIS — D48.5 NEOPLASM OF UNCERTAIN BEHAVIOR OF SKIN: Primary | ICD-10-CM

## 2024-06-04 DIAGNOSIS — N40.1 BENIGN PROSTATIC HYPERPLASIA WITH URINARY HESITANCY: ICD-10-CM

## 2024-06-04 RX ORDER — CLINDAMYCIN HYDROCHLORIDE 300 MG/1
300 CAPSULE ORAL 2 TIMES DAILY
Qty: 6 CAPSULE | Refills: 0 | Status: SHIPPED | OUTPATIENT
Start: 2024-06-04 | End: 2024-06-09

## 2024-06-04 RX ORDER — SODIUM PHOSPHATE,MONO-DIBASIC 19G-7G/118
ENEMA (ML) RECTAL
Qty: 1 ENEMA | Refills: 0 | Status: SHIPPED | OUTPATIENT
Start: 2024-06-04

## 2024-06-04 RX ORDER — DIAZEPAM 10 MG/1
TABLET ORAL
Qty: 2 TABLET | Refills: 0 | Status: SHIPPED | OUTPATIENT
Start: 2024-06-04

## 2024-06-04 RX ORDER — CIPROFLOXACIN 500 MG/1
TABLET, FILM COATED ORAL
Qty: 4 TABLET | Refills: 0 | Status: SHIPPED | OUTPATIENT
Start: 2024-06-04

## 2024-06-04 NOTE — TELEPHONE ENCOUNTER
Patient has been scheduled for biopsy. Requesting enema, antibiotics, and valium sent to hospital pharmacy.

## 2024-06-07 ENCOUNTER — TRANSCRIBE ORDERS (OUTPATIENT)
Dept: ADMINISTRATIVE | Facility: HOSPITAL | Age: 84
End: 2024-06-07
Payer: MEDICARE

## 2024-06-07 DIAGNOSIS — M54.16 LUMBAR RADICULOPATHY: Primary | ICD-10-CM

## 2024-06-18 ENCOUNTER — HOSPITAL ENCOUNTER (OUTPATIENT)
Dept: CT IMAGING | Facility: HOSPITAL | Age: 84
Discharge: HOME | End: 2024-06-18
Payer: MEDICARE

## 2024-06-18 ENCOUNTER — HOSPITAL ENCOUNTER (OUTPATIENT)
Dept: GENERAL RADIOLOGY | Facility: HOSPITAL | Age: 84
Discharge: HOME | End: 2024-06-18
Payer: MEDICARE

## 2024-06-18 ENCOUNTER — TRANSCRIBE ORDERS (OUTPATIENT)
Dept: ADMINISTRATIVE | Facility: HOSPITAL | Age: 84
End: 2024-06-18
Payer: MEDICARE

## 2024-06-18 DIAGNOSIS — M54.16 LUMBAR RADICULOPATHY: ICD-10-CM

## 2024-06-18 DIAGNOSIS — M54.16 RADICULOPATHY, LUMBAR REGION: Primary | ICD-10-CM

## 2024-06-18 DIAGNOSIS — M54.16 RADICULOPATHY, LUMBAR REGION: ICD-10-CM

## 2024-06-18 PROCEDURE — 72131 CT LUMBAR SPINE W/O DYE: CPT | Performed by: RADIOLOGY

## 2024-06-18 PROCEDURE — 72110 X-RAY EXAM L-2 SPINE 4/>VWS: CPT | Performed by: RADIOLOGY

## 2024-06-18 PROCEDURE — 72131 CT LUMBAR SPINE W/O DYE: CPT

## 2024-06-18 PROCEDURE — 72110 X-RAY EXAM L-2 SPINE 4/>VWS: CPT

## 2024-06-20 ENCOUNTER — PROCEDURE VISIT (OUTPATIENT)
Dept: UROLOGY | Facility: CLINIC | Age: 84
End: 2024-06-20
Payer: MEDICARE

## 2024-06-20 VITALS
DIASTOLIC BLOOD PRESSURE: 69 MMHG | SYSTOLIC BLOOD PRESSURE: 129 MMHG | BODY MASS INDEX: 24.51 KG/M2 | WEIGHT: 171.2 LBS | HEIGHT: 70 IN | HEART RATE: 53 BPM

## 2024-06-20 DIAGNOSIS — R39.11 BENIGN PROSTATIC HYPERPLASIA WITH URINARY HESITANCY: Primary | ICD-10-CM

## 2024-06-20 DIAGNOSIS — N40.1 BENIGN PROSTATIC HYPERPLASIA WITH URINARY HESITANCY: Primary | ICD-10-CM

## 2024-06-20 RX ORDER — GENTAMICIN 40 MG/ML
80 INJECTION, SOLUTION INTRAMUSCULAR; INTRAVENOUS ONCE
Status: COMPLETED | OUTPATIENT
Start: 2024-06-20 | End: 2024-06-20

## 2024-06-20 RX ADMIN — GENTAMICIN 80 MG: 40 INJECTION, SOLUTION INTRAMUSCULAR; INTRAVENOUS at 11:09

## 2024-06-20 NOTE — PROGRESS NOTES
Chief Complaint:      Chief Complaint   Patient presents with    Benign Prostatic Hypertrophy    Elevated PSA     Prostate biopsy        HPI:   83 y.o. male with a rising PSA here for biopsy    Past Medical History:     Past Medical History:   Diagnosis Date    Pre-diabetes        Current Meds:     Current Outpatient Medications   Medication Sig Dispense Refill    amLODIPine (NORVASC) 10 MG tablet Take 1 tablet by mouth Daily. 90 tablet 2    aspirin 81 MG EC tablet Take 1 tablet by mouth 2 (Two) Times a Day.      celecoxib (CeleBREX) 200 MG capsule Take 1 capsule by mouth 2 (Two) Times a Day As Needed. 180 capsule 5    ciprofloxacin (Cipro) 500 MG tablet Take 1 tablet by mouth twice a day before procedure 4 tablet 0    diazePAM (VALIUM) 10 MG tablet Take 1 tablet by mouth night before procedure at bedtime and 1 morning of procedure 2 tablet 0    finasteride (PROSCAR) 5 MG tablet Take 1 tablet by mouth Daily. 30 tablet 5    finasteride (PROSCAR) 5 MG tablet Take 1 tablet by mouth Daily. 90 tablet 3    flecainide (TAMBOCOR) 50 MG tablet Take ½ tablet by mouth 2 (Two) Times a Day. 30 tablet 5    GLUCOSAMINE-CHONDROITIN PO Take 1 tablet by mouth Daily.      ibuprofen (ADVIL,MOTRIN) 600 MG tablet Take 1/2-1 tablet by mouth Daily As Needed. 100 tablet 1    melatonin 5 MG tablet tablet Take 1 tablet by mouth At Night As Needed.      multivitamin with minerals (MULTIVITAMIN ADULTS 50+ PO) Take 1 tablet by mouth Daily.      Omega-3 Fatty Acids (fish oil) 1000 MG capsule capsule Take 1 capsule by mouth Daily With Breakfast.      pantoprazole (Protonix) 40 MG EC tablet Take 1 tablet by mouth Daily. 90 tablet 4    Sodium Phosphates ([employer] Enema Disposable) 19-7 GM/118ML enema Use morning of the procedure 1 enema 0    tamsulosin (FLOMAX) 0.4 MG capsule 24 hr capsule Take 1 capsule by mouth every day 1/2 hour following the same meal each day. 90 capsule 3    triamcinolone (KENALOG) 0.1 % paste Apply a small amount to the affected  area after meals 2- 3 times every day. 5 g 1    vitamin C (ASCORBIC ACID) 250 MG tablet Take 1 tablet by mouth Daily.       No current facility-administered medications for this visit.        Allergies:      No Known Allergies     Past Surgical History:     Past Surgical History:   Procedure Laterality Date    BACK SURGERY  2004    2012    COLON RESECTION      HUMERUS FRACTURE SURGERY      ROTATOR CUFF REPAIR Right 2000    SKIN CANCER EXCISION  2010    Melanoma resection       Social History:     Social History     Socioeconomic History    Marital status:    Tobacco Use    Smoking status: Former     Current packs/day: 0.00     Types: Cigarettes     Quit date:      Years since quittin.4     Passive exposure: Past    Smokeless tobacco: Never   Vaping Use    Vaping status: Never Used   Substance and Sexual Activity    Alcohol use: Yes     Comment: 3 oz     Drug use: No    Sexual activity: Defer       Family History:     Family History   Problem Relation Age of Onset    Diverticulitis Mother     Rheum arthritis Mother     Diabetes Mother     Lung cancer Father     Heart disease Father     Heart disease Brother        Review of Systems:     Review of Systems   Constitutional: Negative.    HENT: Negative.     Eyes: Negative.    Respiratory: Negative.     Cardiovascular: Negative.    Gastrointestinal: Negative.    Endocrine: Negative.    Musculoskeletal: Negative.    Allergic/Immunologic: Negative.    Neurological: Negative.    Hematological: Negative.    Psychiatric/Behavioral: Negative.         Physical Exam:     Physical Exam  Vitals and nursing note reviewed.   Constitutional:       Appearance: He is well-developed.   HENT:      Head: Normocephalic and atraumatic.   Eyes:      Conjunctiva/sclera: Conjunctivae normal.      Pupils: Pupils are equal, round, and reactive to light.   Cardiovascular:      Rate and Rhythm: Normal rate and regular rhythm.      Heart sounds: Normal heart sounds.   Pulmonary:       Effort: Pulmonary effort is normal.      Breath sounds: Normal breath sounds.   Abdominal:      General: Bowel sounds are normal.      Palpations: Abdomen is soft.   Musculoskeletal:         General: Normal range of motion.      Cervical back: Normal range of motion.   Skin:     General: Skin is warm and dry.   Neurological:      Mental Status: He is alert and oriented to person, place, and time.      Deep Tendon Reflexes: Reflexes are normal and symmetric.   Psychiatric:         Behavior: Behavior normal.         Thought Content: Thought content normal.         Judgment: Judgment normal.         I have reviewed the following portions of the patient's history: Allergies, current medications, past family history, past medical history, past social history, past surgical history, problem list, and ROS and confirm it is accurate.    Recent Image (CT and/or KUB):      CT Abdomen and Pelvis: No results found for this or any previous visit.       CT Stone Protocol: No results found for this or any previous visit.       KUB: No results found for this or any previous visit.       Labs (past 3 months):      Lab on 05/23/2024   Component Date Value Ref Range Status    PSA 05/23/2024 9.570 (H)  0.000 - 4.000 ng/mL Final        Procedure:     Prostate ultrasound and biopsy:  83 year-old white male with a history of an elevated PSA and a significant increase in his risk for prostate cancer.  We discussed the prostate biopsy at length.  We discussed the significant risks of anesthesia, bleeding, infection, urosepsis, purported effects on erectile function, and rectal bleeding requiring surgical intervention.  He was given a pre-procedural enema.  He was pretreated with ciprofloxacin for 3 days.  He was given periprocedural gentamicin at the dose of 80 mg in an intramuscular fashion and given post biopsy clindamycin for 3 days.  Following an extensive informed consent, he was brought to the operative suite, placed in the left  lateral decubitus position.  He was given his prophylactic gentamicin.  The rectum was anesthetized with 20 mL of 2% viscous Xylocaine jelly.  I then used the BK biplanar probe inserted into the rectum and made measurements of the prostate.  The height was 5.21 cm, the width was 4.31 cm, and the length was 4.95 cm for a volume of 57.8 g.  There were no obvious hypoechoic areas.  There was no intravesical median lobe.  There was minimal calcification between the transition and peripheral zone.  I then injected 20 mL of 1% Xylocaine in the perirectal area and raised of skin wheal to provide anesthesia after an adequate period of topical anesthesia. I used the Biopty gun to take a total of 10 cores without complication.  He tolerated this extremely well.  Cores.  There was no bleeding or complications.   Impression: Elevated PSA s/p biopsy.  Assessment/Plan:   Elevated prostate specific antigen-we discussed the diagnosis of elevated prostate-specific antigen.  I explained the pathophysiology of PSA.  It is a serine protease that's function in the male reproductive tract is to facilitate the liquefaction of semen.  It is for this reason the body does not want it freely floating in the serum and why typically bound tightly to albumin.  We discussed why we used both a PSA free and total to determine the need for more aggressive therapy. I discussed the normal range.  Additionally, it was in the range of 1 to 4, but more recently has been downgraded to something less than 2 or even approaching 1.  I discussed the risk of family history, particularly the fact that the average male has a 14% risk of prostate cancer and that in the face of a positive diagnosis in a father it will tablet and any other first-generation relative continued tablet insofar that a father and brother with prostate cancer will produce almost a 50% risk of prostate cancer.  I discussed the use of the temporal use of PSA as the best option for  monitoring.  We also discussed the fact that an elevated PSA is an isolated event does not mean that this is prostate cancer and should not engender worry in this regard. I discussed other things that can elevate PSA including constipation, prostatitis, infection, recent intercourse etc., as well as the risks and benefits associated with this.  Also discussed the fact that this is with a dilutional test and as a consequence of such were present produce slight variations on a single specimen.  Further discussed the risks and benefits of a prostate biopsy as well.          This document has been electronically signed by CHIKA MINOR MD June 20, 2024 09:16 EDT    Dictated Utilizing Dragon Dictation: Part of this note may be an electronic transcription/translation of spoken language to printed text using the Dragon Dictation System.

## 2024-06-21 LAB — REF LAB TEST METHOD: NORMAL

## 2024-06-25 DIAGNOSIS — N40.1 BENIGN PROSTATIC HYPERPLASIA WITH URINARY HESITANCY: Primary | ICD-10-CM

## 2024-06-25 DIAGNOSIS — C61 PROSTATE CANCER: Primary | ICD-10-CM

## 2024-06-25 DIAGNOSIS — D48.5 NEOPLASM OF UNCERTAIN BEHAVIOR OF SKIN: ICD-10-CM

## 2024-06-25 DIAGNOSIS — R39.11 BENIGN PROSTATIC HYPERPLASIA WITH URINARY HESITANCY: Primary | ICD-10-CM

## 2024-06-27 ENCOUNTER — OFFICE VISIT (OUTPATIENT)
Dept: UROLOGY | Facility: CLINIC | Age: 84
End: 2024-06-27
Payer: MEDICARE

## 2024-06-27 VITALS
BODY MASS INDEX: 24.85 KG/M2 | HEIGHT: 70 IN | WEIGHT: 173.6 LBS | HEART RATE: 47 BPM | SYSTOLIC BLOOD PRESSURE: 156 MMHG | DIASTOLIC BLOOD PRESSURE: 76 MMHG

## 2024-06-27 DIAGNOSIS — N40.1 BENIGN PROSTATIC HYPERPLASIA WITH URINARY HESITANCY: ICD-10-CM

## 2024-06-27 DIAGNOSIS — R39.11 BENIGN PROSTATIC HYPERPLASIA WITH URINARY HESITANCY: ICD-10-CM

## 2024-06-27 DIAGNOSIS — C61 PROSTATE CANCER: Primary | ICD-10-CM

## 2024-06-27 PROCEDURE — 1159F MED LIST DOCD IN RCRD: CPT | Performed by: UROLOGY

## 2024-06-27 PROCEDURE — 1160F RVW MEDS BY RX/DR IN RCRD: CPT | Performed by: UROLOGY

## 2024-06-27 PROCEDURE — 99214 OFFICE O/P EST MOD 30 MIN: CPT | Performed by: UROLOGY

## 2024-06-27 NOTE — PROGRESS NOTES
Chief Complaint:      Chief Complaint   Patient presents with    Prostate Biopsy Results       HPI:   83 y.o. male here to discuss his upcoming appointment with Dr. Harvey for CyberKnife.  He has prostate cancer [place] group 4 he is getting get a staging workup we discussed PSMA staging grading etc. I will see him back when it is time for fiduciary seeds.    Past Medical History:     Past Medical History:   Diagnosis Date    Pre-diabetes        Current Meds:     Current Outpatient Medications   Medication Sig Dispense Refill    amLODIPine (NORVASC) 10 MG tablet Take 1 tablet by mouth Daily. 90 tablet 2    aspirin 81 MG EC tablet Take 1 tablet by mouth 2 (Two) Times a Day.      celecoxib (CeleBREX) 200 MG capsule Take 1 capsule by mouth 2 (Two) Times a Day As Needed. 180 capsule 5    ciprofloxacin (Cipro) 500 MG tablet Take 1 tablet by mouth twice a day before procedure 4 tablet 0    diazePAM (VALIUM) 10 MG tablet Take 1 tablet by mouth night before procedure at bedtime and 1 morning of procedure 2 tablet 0    finasteride (PROSCAR) 5 MG tablet Take 1 tablet by mouth Daily. 30 tablet 5    finasteride (PROSCAR) 5 MG tablet Take 1 tablet by mouth Daily. 90 tablet 3    flecainide (TAMBOCOR) 50 MG tablet Take ½ tablet by mouth 2 (Two) Times a Day. 30 tablet 5    GLUCOSAMINE-CHONDROITIN PO Take 1 tablet by mouth Daily.      ibuprofen (ADVIL,MOTRIN) 600 MG tablet Take 1/2-1 tablet by mouth Daily As Needed. 100 tablet 1    melatonin 5 MG tablet tablet Take 1 tablet by mouth At Night As Needed.      multivitamin with minerals (MULTIVITAMIN ADULTS 50+ PO) Take 1 tablet by mouth Daily.      Omega-3 Fatty Acids (fish oil) 1000 MG capsule capsule Take 1 capsule by mouth Daily With Breakfast.      pantoprazole (Protonix) 40 MG EC tablet Take 1 tablet by mouth Daily. 90 tablet 4    Sodium Phosphates ([employer] Enema Disposable) 19-7 GM/118ML enema Use morning of the procedure 1 enema 0    tamsulosin (FLOMAX) 0.4 MG capsule 24 hr  capsule Take 1 capsule by mouth every day 1/2 hour following the same meal each day. 90 capsule 3    triamcinolone (KENALOG) 0.1 % paste Apply a small amount to the affected area after meals 2- 3 times every day. 5 g 1    vitamin C (ASCORBIC ACID) 250 MG tablet Take 1 tablet by mouth Daily.       No current facility-administered medications for this visit.        Allergies:      No Known Allergies     Past Surgical History:     Past Surgical History:   Procedure Laterality Date    BACK SURGERY  2004    2012    COLON RESECTION      HUMERUS FRACTURE SURGERY      ROTATOR CUFF REPAIR Right 2000    SKIN CANCER EXCISION  2010    Melanoma resection       Social History:     Social History     Socioeconomic History    Marital status:    Tobacco Use    Smoking status: Former     Current packs/day: 0.00     Types: Cigarettes     Quit date:      Years since quittin.5     Passive exposure: Past    Smokeless tobacco: Never   Vaping Use    Vaping status: Never Used   Substance and Sexual Activity    Alcohol use: Yes     Comment: 3 oz     Drug use: No    Sexual activity: Defer       Family History:     Family History   Problem Relation Age of Onset    Diverticulitis Mother     Rheum arthritis Mother     Diabetes Mother     Lung cancer Father     Heart disease Father     Heart disease Brother        Review of Systems:     Review of Systems   Constitutional: Negative.    HENT: Negative.     Eyes: Negative.    Respiratory: Negative.     Cardiovascular: Negative.    Gastrointestinal: Negative.    Endocrine: Negative.    Musculoskeletal: Negative.    Allergic/Immunologic: Negative.    Neurological: Negative.    Hematological: Negative.    Psychiatric/Behavioral: Negative.         Physical Exam:     Physical Exam  Vitals and nursing note reviewed.   Constitutional:       Appearance: He is well-developed.   HENT:      Head: Normocephalic and atraumatic.   Eyes:      Conjunctiva/sclera: Conjunctivae normal.      Pupils:  Pupils are equal, round, and reactive to light.   Cardiovascular:      Rate and Rhythm: Normal rate and regular rhythm.      Heart sounds: Normal heart sounds.   Pulmonary:      Effort: Pulmonary effort is normal.      Breath sounds: Normal breath sounds.   Abdominal:      General: Bowel sounds are normal.      Palpations: Abdomen is soft.   Musculoskeletal:         General: Normal range of motion.      Cervical back: Normal range of motion.   Skin:     General: Skin is warm and dry.   Neurological:      Mental Status: He is alert and oriented to person, place, and time.      Deep Tendon Reflexes: Reflexes are normal and symmetric.   Psychiatric:         Behavior: Behavior normal.         Thought Content: Thought content normal.         Judgment: Judgment normal.         I have reviewed the following portions of the patient's history: Allergies, current medications, past family history, past medical history, past social history, past surgical history, problem list, and ROS and confirm it is accurate.    Recent Image (CT and/or KUB):      CT Abdomen and Pelvis: No results found for this or any previous visit.       CT Stone Protocol: No results found for this or any previous visit.       KUB: No results found for this or any previous visit.       Labs (past 3 months):      Procedure visit on 2024   Component Date Value Ref Range Status    Reference Lab Report 2024    Final                    Value:[facility]  [address] [place] [address]  Phone: [phone] or [phone]  Fax: [phone]  Jose Gary M.D., Medical Director    PATIENT NAME                           LABORATORY NO.  SERENE RICHARDSON                    [id number]  [id number]                         AGE              SEX  N           CLIENT REF #  [facility]      83      1940      [id number]   [id number]    [address] [facility] [address]            REQUESTING M.D.      ATTENDING [place] [address]                   DWAYNE MINOR STEVEN CHRISTOPHER  DATE COLLECTED      DATE RECEIVED      DATE REPORTED  06/20/2024 06/20/2024 06/21/2024    DIAGNOSIS:  A.   PROSTATE, NEEDLE CORE BIOPSY, RIGHT APEX:  Prostatic acinar adenocarcinoma, grade group 4 (Patricia 4+4), involving  5% of specimen  B.   PROSTATE, NEEDLE CORE BIOPSY, RIGHT MID:  Benign prostatic stroma, negative for glandular elements  C.                             PROSTATE, NEEDLE CORE BIOPSY, RIGHT BASE:  Benign prostatic stroma, negative for glandular elements  D.   PROSTATE, NEEDLE CORE BIOPSY, RIGHT LATERAL MID:  Prostatic acinar adenocarcinoma, grade group 3 (Patricia 4+3), involving  10% of specimen  Focal atrophy and chronic inflammation  E.   PROSTATE, NEEDLE CORE BIOPSY, RIGHT LATERAL BASE:  Benign prostatic tissue  F.   PROSTATE, NEEDLE CORE BIOPSY, LEFT MID:  Prostatic acinar adenocarcinoma, grade group 3 ([place] 4+3), involving  2% of specimen  G.   PROSTATE, NEEDLE CORE BIOPSY, LEFT BASE:  Benign prostatic tissue with focal acute inflammation  H.   PROSTATE, NEEDLE CORE BIOPSY, LEFT LATERAL APEX:  Prostatic acinar adenocarcinoma, grade group 4 ([place] 4+4), involving  10% of specimen  I.   PROSTATE, NEEDLE CORE BIOPSY, LEFT LATERAL MID:  Benign prostatic tissue with focal chronic inflammation  J.   PROSTATE, NEEDLE CORE BIOPSY, LEFT LATERAL BASE:  Benign prostatic tissue    CAM    COMMENT:  Results are called to Zeny at Dr. Minor's office by Dr. Luong  at 12:35 PM on 6/21/2024.    CLINICAL HISTORY:  Benign prostatic hyperplasia with urinary hesitancy    SPECIMENS RECEIVED:  A.  PROSTATE, NEEDLE CORE BIOPSY , RIGHT APEX  B.  PROSTATE, NEEDLE CORE BIOPSY , RIGHT MID  C.  PROSTATE, NEEDLE CORE BIOPSY , RIGHT BASE  D.  PROSTATE, NEEDLE CORE BIOPSY , RIGHT LATERAL MID  E.  PROSTATE, NEEDLE CORE BIOPSY , RIGHT LATERAL BASE  F.  PROSTATE,  "NEEDLE CORE BIOPSY , LEFT MID  G.  PROSTATE, NEEDLE CORE BIOPSY , LEFT BASE  H.  PROSTATE, NEEDLE CORE BIOPSY , LEFT LATERAL APEX  I.  PROSTATE, NEEDLE CORE BIOPSY , LEFT LATERAL MID  J.  PROSTATE, NEEDLE CORE BIOPSY , LEFT LATERAL BASE    MICROSCOPIC DESCRIPTION:  Tissue blocks are prepared and slides are examined microscopically on all  specimens. See diagnosis for details.    Professional interpretation rendered by Betzy Luong M.D., [facility] at  [facility], [address] [place] [address].    GROSS DESCRIPTION:  A.  Received in a cassette labeled \"right apex\".                            Consists of 1 tan needle core  measuring 0.4 cm in length and 0.1 cm in diameter and is submitted  entirely in 1 block.  LUPE  B.  Received in a cassette labeled \"right mid\".  Consists of 1 tan needle core  measuring 1.5 cm in length and 0.1 cm in diameter and is submitted  entirely in 1 block.  C.  Received in a cassette labeled \"right base\".  Consists of 1 tan needle core  measuring 1.0 cm in length and 0.1 cm in diameter and is submitted  entirely in 1 block.  D.  Received in a cassette labeled \"right lateral mid\".  Consists of 1 tan needle  core measuring 1.3 cm in length and 0.1 cm in diameter and is submitted  entirely in 1 block.  E.  Received in a cassette labeled \"right lateral base\".  Consists of 1 tan needle  core measuring 2.0 cm in length and 0.1 cm in diameter and is submitted  entirely in 1 block.  F.  Received in a cassette labeled \"left mid\".  Consists of 1 tan needle core  measuring 1.0 cm in length and 0.1 cm in diameter and is submitted  entirely in 1 block.  G.                            Received in a cassette labeled \"left base\".  Consists of 1 tan needle core  measuring 1.1 cm in length and 0.1 cm in diameter and is submitted  entirely in 1 block.  H.  Received in a cassette labeled \"left lateral apex\".  Consists of 1 tan needle  core measuring 1.8 cm in length and 0.1 cm in diameter and is " "submitted  entirely in 1 block.  I.  Received in a cassette labeled \"left lateral mid\".  Consists of 1 tan needle  core measuring 1.0 cm in length and 0.1 cm in diameter and is submitted  entirely in 1 block.  J.  Received in a cassette labeled \"left lateral base\".  Consists of 1 tan needle  core measuring 1.2 cm in length and 0.1 cm in diameter and is submitted  entirely in 1 block.    REVIEWED, DIAGNOSED AND ELECTRONICALLY  SIGNED BY:    Betzy Luong M.D., F.C.A.P.  CPT CODES:  88305x10     Lab on 05/23/2024   Component Date Value Ref Range Status    PSA 05/23/2024 9.570 (H)  0.000 - 4.000 ng/mL Final        Procedure:       Assessment/Plan:   Prostate cancer:  He returns today status post biopsy for extensive discussion of prostate cancer.  We discussed staging and grading of the disease.  I described with a Patricia system being from a 2 to10 scale with the most common low-grade pattern being a Patricia 6.  I discussed the staging workup including a total body bone scan and CT scan especially with a PSA greater than 10.  We discussed the various options at length. I discussed a radical retropubic prostatectomy done in the traditional fashion and using the robotic technique.  I then discussed radiation treatment both seed therapy and external beam therapy using Gold fiduciary markers.  We talked about some of the other alternatives such as a cryosurgical ablation at high intensity focused ultrasound as being viable alternatives but not recommended at this time.  He focused on aggressive watchful waiting and explained that currently low literature it's been discovered that a lot of men can actually observe it especially with numerous other comorbidities cannot have problems from the cancer by itself.  Talked about hormonal ablation and the side effects of creation of insulin resistance.  Overall, the patient was given appropriate literature, is going to think about it, and I'm going to revisit the topic with " them after the next office visit.  For CyberKnife with Dr. Harvey in [place]        This document has been electronically signed by CHIKA MINOR MD June 27, 2024 14:57 EDT    Dictated Utilizing Dragon Dictation: Part of this note may be an electronic transcription/translation of spoken language to printed text using the Dragon Dictation System.

## 2024-06-28 ENCOUNTER — HOSPITAL ENCOUNTER (OUTPATIENT)
Dept: CT IMAGING | Facility: HOSPITAL | Age: 84
Discharge: HOME | End: 2024-06-28
Payer: MEDICARE

## 2024-06-28 DIAGNOSIS — C61 PROSTATE CANCER: ICD-10-CM

## 2024-06-28 PROCEDURE — 74176 CT ABD & PELVIS W/O CONTRAST: CPT

## 2024-06-29 PROBLEM — C61 PROSTATE CANCER: Status: ACTIVE | Noted: 2024-06-29

## 2024-07-02 ENCOUNTER — HOSPITAL ENCOUNTER (OUTPATIENT)
Facility: HOSPITAL | Age: 84
Setting detail: RADIATION/ONCOLOGY SERIES
End: 2024-07-02
Payer: MEDICARE

## 2024-07-03 ENCOUNTER — HOSPITAL ENCOUNTER (OUTPATIENT)
Dept: NUCLEAR MEDICINE | Facility: HOSPITAL | Age: 84
Discharge: HOME | End: 2024-07-03
Payer: MEDICARE

## 2024-07-03 DIAGNOSIS — C61 PROSTATE CANCER: ICD-10-CM

## 2024-07-03 PROCEDURE — 0 TECHNETIUM OXIDRONATE KIT: Performed by: UROLOGY

## 2024-07-03 PROCEDURE — 78306 BONE IMAGING WHOLE BODY: CPT

## 2024-07-03 PROCEDURE — A9561 TC99M OXIDRONATE: HCPCS | Performed by: UROLOGY

## 2024-07-03 RX ADMIN — TECHNETIUM TC 99M OXIDRONATE 1 DOSE: 3.15 INJECTION, POWDER, LYOPHILIZED, FOR SOLUTION INTRAVENOUS at 11:55

## 2024-07-05 ENCOUNTER — OFFICE VISIT (OUTPATIENT)
Age: 84
End: 2024-07-05
Payer: MEDICARE

## 2024-07-05 VITALS
OXYGEN SATURATION: 98 % | HEART RATE: 55 BPM | DIASTOLIC BLOOD PRESSURE: 79 MMHG | SYSTOLIC BLOOD PRESSURE: 166 MMHG | BODY MASS INDEX: 24.77 KG/M2 | TEMPERATURE: 97.6 F | RESPIRATION RATE: 16 BRPM | HEIGHT: 70 IN | WEIGHT: 173 LBS

## 2024-07-05 DIAGNOSIS — C61 PROSTATE CANCER: Primary | ICD-10-CM

## 2024-07-05 PROCEDURE — G0463 HOSPITAL OUTPT CLINIC VISIT: HCPCS

## 2024-07-05 NOTE — PROGRESS NOTES
CONSULTATION NOTE      :                                                          1940  DATE OF CONSULTATION:                       2024   REQUESTING PHYSICIAN:                   Twin Barnett, *  REASON FOR CONSULTATION:           Prostate cancer  - Stage IIC (cT1c, cN0, cM0, PSA: 9.6, Grade Group: 4)         BRIEF HISTORY:  The patient is a very pleasant 83 y.o. male  with recent diagnosis of prostate.  He has a history of significant BPH.  He has been on finasteride.  He revealed rising PSA values of 4.8 ng/ml on 2023, 5.6 ng/mL on 2024 and 9.57 ng/mL on 2024.  Prostate biopsy performed 2024.  This revealed presence of prostatic adenocarcinoma bilaterally.  2 out of 5 cores from the right lobe contained neoplasm.  [place]'s 4+4 = 8 was present at the right apex involving 5% specimen and Patricia's 4+3=7 was present at the right lateral mid involving 10% tissue.  2 out of 5 cores from the left lobe contained neoplasm.  [place]'s 4+3=7 was present left mid gland involving 2% tissue.  [place]'s 4+4 = 8 was present at the left lateral apex involving 10% tissue.  There was no mention of perineural invasion or lymphovascular invasion.  No extraprostatic extension noted.    Bone scan showed degenerative change.  No evidence of metastatic disease.  CT abdomen pelvis shows enlarged prostate gland.  No definite evidence of extraprostatic extension or metastasis.  No pathologic lymphadenopathy.    Patient has significant degenerative back pain but is still otherwise active and healthy.  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  He should have predicted longevity greater than 10 years.  He is interested in definitive treatment for his prostate cancer.    Allergy: No Known Allergies    Social History:   Social History     Socioeconomic History    Marital status:    Tobacco Use    Smoking status: Former     Current packs/day: 0.00     Types: Cigarettes     Quit date:  "1980     Years since quittin.5     Passive exposure: Past    Smokeless tobacco: Never   Vaping Use    Vaping status: Never Used   Substance and Sexual Activity    Alcohol use: Yes     Comment: 3 oz     Drug use: No    Sexual activity: Defer       Past Medical History:   Past Medical History:   Diagnosis Date    Atrial fibrillation     Diverticulitis 2005    Hypertension     Pre-diabetes     Prostate cancer     Skin cancer 2010    Melanoma       Family History: family history includes Diabetes in his mother; Diverticulitis in his mother; Heart disease in his brother and father; Lung cancer in his father; Rheum arthritis in his mother.     Surgical History:   Past Surgical History:   Procedure Laterality Date    BACK SURGERY  2004    COLON RESECTION      HUMERUS FRACTURE SURGERY      ROTATOR CUFF REPAIR Right 2000    SKIN CANCER EXCISION  2010    Melanoma resection        Review of Systems:   Review of Systems   Genitourinary:  Positive for bladder incontinence, frequency and nocturia (2-3 times).         Urgency   Musculoskeletal:  Positive for back pain (radiculopathy right leg).   All other systems reviewed and are negative.          Objective   VITAL SIGNS:   Vitals:    24 0848   BP: 166/79   Pulse: 55   Resp: 16   Temp: 97.6 °F (36.4 °C)   TempSrc: Oral   SpO2: 98%   Weight: 78.5 kg (173 lb)   Height: 177.8 cm (70\")   PainSc: 0-No pain        Karnofsky score: 90     IPSS Questionnaire (AUA-7):  Over the past month…    1)  Incomplete Emptying  How often have you had a sensation of not emptying your bladder?  2 - Less than half the time   2)  Frequency  How often have you had to urinate less than every two hours? 1 - Less than 1 time in 5   3)  Intermittency  How often have you found you stopped and started again several times when you urinated?  2 - Less than half the time   4) Urgency  How often have you found it difficult to postpone urination?  3 - About half the time   5) Weak " Stream  How often have you had a weak urinary stream?  2 - Less than half the time   6) Straining  How often have you had to push or strain to begin urination?  2 - Less than half the time   7) Nocturia  How many times did you typically get up at night to urinate?  3 - 3 times   Total Score:  14       Quality of life due to urinary symptoms:  If you were to spend the rest of your life with your urinary condition the way it is now, how would you feel about that? 2-Mostly Satisfied   Urine Leakage (Incontinence) 1-Mild (A few drops a day, no pad use)     Sexual Health Inventory  Current Status    1)  How do you rate your confidence that you could achieve and keep an erection? 2-Low   2) When you had erections with sexual stimulation, how often were your erections hard enough for penetration (entering your partner)? 2-A few times (much less than half the time)   3)  During sexual intercourse, how often were you able to maintain your erection after you had penetrated (entered) into your partner? 0-Did not attempt intercourse   4) During sexual intercourse, how difficult was it to maintain your erection to completion of intercourse? 0-Did not attempt intercourse   5) When you attempted sexual intercourse, how often was it satisfactory to you? 0-No sexual activity   Total Score: 4       Bowel Health Inventory  Current Status: 0-No problems, no rectal bleeding, no discharge, less then 5 bowel movements a day         Physical Exam:   Physical Exam  Vitals and nursing note reviewed.   Constitutional:       Appearance: He is well-developed.   HENT:      Head: Normocephalic and atraumatic.   Cardiovascular:      Rate and Rhythm: Normal rate and regular rhythm.      Heart sounds: Normal heart sounds. No murmur heard.  Pulmonary:      Effort: Pulmonary effort is normal.      Breath sounds: Normal breath sounds. No wheezing or rales.   Abdominal:      General: Bowel sounds are normal. There is no distension.      Palpations:  Abdomen is soft.      Tenderness: There is no abdominal tenderness.   Genitourinary:     Prostate: Enlarged (60+ cc prostate gland symmetrically enlarged, smooth, no nodules or induration.  Some vesicles nonpalpable.). Not tender and no nodules present.      Rectum: External hemorrhoid present. No mass, tenderness, anal fissure or internal hemorrhoid. Normal anal tone.   Musculoskeletal:         General: No tenderness. Normal range of motion.      Cervical back: Normal range of motion and neck supple.   Lymphadenopathy:      Cervical: No cervical adenopathy.      Upper Body:      Right upper body: No supraclavicular adenopathy.      Left upper body: No supraclavicular adenopathy.   Skin:     General: Skin is warm and dry.   Neurological:      Mental Status: He is alert and oriented to person, place, and time.      Sensory: No sensory deficit.   Psychiatric:         Behavior: Behavior normal.         Thought Content: Thought content normal.         Judgment: Judgment normal.              The following portions of the patient's history were reviewed and updated as appropriate: allergies, current medications, past family history, past medical history, past social history, past surgical history, and problem list.    Assessment:   Assessment      Prostate cancer, [place]'s 4+4 = 8, clinical stage IIC (T1c, M0, M0), PSA 9.6 ng/ml.  We reviewed the radiotherapy treatment options.  He is most interested in stereotactic body radiotherapy and less interested in prolonged course of intensity-modulated therapy.  He would not be a good candidate for brachytherapy due to the size of the prostate gland.  We discussed possibly obtaining a PSMA PET scan prior to treatment, but he does have conventional imaging studies which show no evidence of metastasis and he would like to proceed with treatment of his prostate cancer as soon as possible. He is comfortable with treating based on the available standard imaging studies.  I believe  that is reasonable since he has fairly low volume of the highest tumor grade and the others samples are intermediate risk [place] score.  We reviewed the CyberKnife treatment procedure in detail today.  Informed consent was obtained.    RECOMMENDATIONS: He will return to Dr. Barnett for placement of gold seed fiducials.  He will subsequently return for treatment planning CT and MRI pelvis.  The prostate gland received 5 fractions of 7 Gray, delivered on CyberKnife, on an every other day treatment schedule.    I spent a total of 55 minutes on todays visit, with more than 45 minutes in direct face to face communication, and the remainder of the time spent in reviewing the relevant history, records, available imaging, and for documentation.    Follow Up:   Return in about 19 days (around 7/24/2024) for Simulation.  Diagnoses and all orders for this visit:    1. Prostate cancer (Primary)         Jeff Harvey MD

## 2024-07-08 ENCOUNTER — TELEPHONE (OUTPATIENT)
Dept: UROLOGY | Facility: CLINIC | Age: 84
End: 2024-07-08
Payer: MEDICARE

## 2024-07-08 ENCOUNTER — TELEPHONE (OUTPATIENT)
Dept: RADIATION ONCOLOGY | Facility: HOSPITAL | Age: 84
End: 2024-07-08
Payer: MEDICARE

## 2024-07-08 DIAGNOSIS — C61 PROSTATE CANCER: Primary | ICD-10-CM

## 2024-07-08 RX ORDER — CLINDAMYCIN HYDROCHLORIDE 300 MG/1
300 CAPSULE ORAL 2 TIMES DAILY
Qty: 6 CAPSULE | Refills: 0 | Status: SHIPPED | OUTPATIENT
Start: 2024-07-08 | End: 2024-07-12

## 2024-07-08 RX ORDER — SODIUM PHOSPHATE,MONO-DIBASIC 19G-7G/118
ENEMA (ML) RECTAL
Qty: 133 ML | Refills: 0 | Status: SHIPPED | OUTPATIENT
Start: 2024-07-08

## 2024-07-08 RX ORDER — DIAZEPAM 10 MG/1
TABLET ORAL
Qty: 2 TABLET | Refills: 0 | Status: SHIPPED | OUTPATIENT
Start: 2024-07-08

## 2024-07-08 RX ORDER — CIPROFLOXACIN 500 MG/1
TABLET, FILM COATED ORAL
Qty: 4 TABLET | Refills: 0 | Status: SHIPPED | OUTPATIENT
Start: 2024-07-08

## 2024-07-08 NOTE — TELEPHONE ENCOUNTER
Patient has been scheduled for fiducial marker placement. Please sent enema, antibiotics, and valium to hospital pharmacy.

## 2024-07-08 NOTE — TELEPHONE ENCOUNTER
Pt called and left message wanting to talk to me about his schedule.  [facility] message sent with details of all upcoming appointment dates, time and prep for cyberknife planning.  Also called pt and discussed in detail appointments, dates and prep.    Pt verbalized understanding of all instructions.

## 2024-07-18 ENCOUNTER — PROCEDURE VISIT (OUTPATIENT)
Dept: UROLOGY | Facility: CLINIC | Age: 84
End: 2024-07-18
Payer: MEDICARE

## 2024-07-18 VITALS
DIASTOLIC BLOOD PRESSURE: 70 MMHG | BODY MASS INDEX: 24.97 KG/M2 | SYSTOLIC BLOOD PRESSURE: 147 MMHG | HEART RATE: 50 BPM | HEIGHT: 70 IN | WEIGHT: 174.4 LBS

## 2024-07-18 DIAGNOSIS — Z48.816 AFTERCARE FOLLOWING SURGERY OF THE GENITOURINARY SYSTEM: Primary | ICD-10-CM

## 2024-07-18 DIAGNOSIS — C61 PROSTATE CANCER: ICD-10-CM

## 2024-07-18 RX ORDER — GENTAMICIN 40 MG/ML
80 INJECTION, SOLUTION INTRAMUSCULAR; INTRAVENOUS ONCE
Status: COMPLETED | OUTPATIENT
Start: 2024-07-18 | End: 2024-07-18

## 2024-07-18 RX ADMIN — GENTAMICIN 80 MG: 40 INJECTION, SOLUTION INTRAMUSCULAR; INTRAVENOUS at 09:28

## 2024-07-18 NOTE — PROGRESS NOTES
Chief Complaint:      Chief Complaint   Patient presents with    fiducial makers placement        HPI:   83 y.o. male with prostate cancer here for fiduciary markers in preparation for CyberKnife.  He had preoperative antibiotics.    Past Medical History:     Past Medical History:   Diagnosis Date    Atrial fibrillation 2021    Diverticulitis 2005    Hypertension     Pre-diabetes     Prostate cancer     Skin cancer 2010    Melanoma       Current Meds:     Current Outpatient Medications   Medication Sig Dispense Refill    amLODIPine (NORVASC) 10 MG tablet Take 1 tablet by mouth Daily. 90 tablet 2    aspirin 81 MG EC tablet Take 1 tablet by mouth 2 (Two) Times a Day.      celecoxib (CeleBREX) 200 MG capsule Take 1 capsule by mouth 2 (Two) Times a Day As Needed. 180 capsule 5    ciprofloxacin (Cipro) 500 MG tablet Take 1 tablet by mouth twice a day before procedure 4 tablet 0    diazePAM (VALIUM) 10 MG tablet Take 1 tablet by mouth the night before procedure at bedtime and 1 tablet the morning of 1 hour prior to procedure. 2 tablet 0    finasteride (PROSCAR) 5 MG tablet Take 1 tablet by mouth Daily. 30 tablet 5    finasteride (PROSCAR) 5 MG tablet Take 1 tablet by mouth Daily. 90 tablet 3    flecainide (TAMBOCOR) 50 MG tablet Take ½ tablet by mouth 2 (Two) Times a Day. 30 tablet 5    GLUCOSAMINE-CHONDROITIN PO Take 1 tablet by mouth Daily.      ibuprofen (ADVIL,MOTRIN) 600 MG tablet Take 1/2-1 tablet by mouth Daily As Needed. 100 tablet 1    melatonin 5 MG tablet tablet Take 1 tablet by mouth At Night As Needed.      multivitamin with minerals (MULTIVITAMIN ADULTS 50+ PO) Take 1 tablet by mouth Daily.      Omega-3 Fatty Acids (fish oil) 1000 MG capsule capsule Take 1 capsule by mouth Daily With Breakfast.      pantoprazole (Protonix) 40 MG EC tablet Take 1 tablet by mouth Daily. 90 tablet 4    Sodium Phosphates ([employer] Enema Disposable) 19-7 GM/118ML enema Use morning of the procedure 133 mL 0    triamcinolone (KENALOG)  0.1 % paste Apply a small amount to the affected area after meals 2- 3 times every day. 5 g 1    vitamin C (ASCORBIC ACID) 250 MG tablet Take 1 tablet by mouth Daily.       No current facility-administered medications for this visit.        Allergies:      No Known Allergies     Past Surgical History:     Past Surgical History:   Procedure Laterality Date    BACK SURGERY  2004    COLON RESECTION      COLONOSCOPY      cologuard per PCP    HUMERUS FRACTURE SURGERY      ROTATOR CUFF REPAIR Right 2000    SKIN CANCER EXCISION  2010    Melanoma resection       Social History:     Social History     Socioeconomic History    Marital status:    Tobacco Use    Smoking status: Former     Current packs/day: 0.00     Types: Cigarettes     Quit date:      Years since quittin.5     Passive exposure: Past    Smokeless tobacco: Never   Vaping Use    Vaping status: Never Used   Substance and Sexual Activity    Alcohol use: Yes     Comment: 3 oz     Drug use: No    Sexual activity: Defer       Family History:     Family History   Problem Relation Age of Onset    Diverticulitis Mother     Rheum arthritis Mother     Diabetes Mother     Lung cancer Father     Heart disease Father     Heart disease Brother        Review of Systems:     Review of Systems   Constitutional: Negative.    HENT: Negative.     Eyes: Negative.    Respiratory: Negative.     Cardiovascular: Negative.    Gastrointestinal: Negative.    Endocrine: Negative.    Musculoskeletal: Negative.    Allergic/Immunologic: Negative.    Neurological: Negative.    Hematological: Negative.    Psychiatric/Behavioral: Negative.         Physical Exam:     Physical Exam  Vitals and nursing note reviewed.   Constitutional:       Appearance: He is well-developed.   HENT:      Head: Normocephalic and atraumatic.   Eyes:      Conjunctiva/sclera: Conjunctivae normal.      Pupils: Pupils are equal, round, and reactive to light.   Cardiovascular:      Rate and Rhythm:  Normal rate and regular rhythm.      Heart sounds: Normal heart sounds.   Pulmonary:      Effort: Pulmonary effort is normal.      Breath sounds: Normal breath sounds.   Abdominal:      General: Bowel sounds are normal.      Palpations: Abdomen is soft.   Genitourinary:     Prostate: Normal.      Rectum: Normal.   Musculoskeletal:         General: Normal range of motion.      Cervical back: Normal range of motion.   Skin:     General: Skin is warm and dry.   Neurological:      Mental Status: He is alert and oriented to person, place, and time.      Deep Tendon Reflexes: Reflexes are normal and symmetric.   Psychiatric:         Behavior: Behavior normal.         Thought Content: Thought content normal.         Judgment: Judgment normal.         I have reviewed the following portions of the patient's history: Allergies, current medications, past family history, past medical history, past social history, past surgical history, problem list, and ROS and confirm it is accurate.    Recent Image (CT and/or KUB):      CT Abdomen and Pelvis: No results found for this or any previous visit.       CT Stone Protocol: Results for orders placed during the hospital encounter of 06/28/24    CT Abdomen Pelvis Stone Protocol    Narrative  EXAM:  CT Abdomen and Pelvis Without Intravenous Contrast    EXAM DATE:  6/28/2024 11:42 AM    CLINICAL HISTORY:  prostate cancer; O98-Olmahlnag neoplasm of prostate    TECHNIQUE:  Axial computed tomography images of the abdomen and pelvis without  intravenous contrast.  Sagittal and coronal reformatted images were  created and reviewed.  This CT exam was performed using one or more of  the following dose reduction techniques:  automated exposure control,  adjustment of the mA and/or kV according to patient size, and/or use of  iterative reconstruction technique.    COMPARISON:  7/10/2015    FINDINGS:  LUNG BASES:  Unremarkable as visualized.  No consolidation.  No  suspicious pulmonary  nodules.    ABDOMEN:  LIVER:  Unremarkable as visualized.  GALLBLADDER AND BILE DUCTS:  Unremarkable as visualized.  No calcified  stones.  No ductal dilation.  PANCREAS:  Unremarkable as visualized.  No ductal dilation.  SPLEEN:  Unremarkable as visualized.  No splenomegaly.  ADRENALS:  Unremarkable as visualized.  No mass.  KIDNEYS AND URETERS:  Unremarkable as visualized.  No obstructing  stones.  No hydronephrosis.  STOMACH AND BOWEL:  Unremarkable as visualized.  No obstruction.  No  mucosal thickening.    PELVIS:  APPENDIX:  No findings to suggest acute appendicitis.  BLADDER:  Unremarkable as visualized.  No stones.  REPRODUCTIVE:  Unremarkable as visualized.    ABDOMEN and PELVIS:  INTRAPERITONEAL SPACE:  Unremarkable as visualized.  No free air.  No  significant fluid collection.  BONES/JOINTS:  Degenerative disc disease throughout the lumbar spine.  Degenerative facet arthropathy throughout the lumbar spine, most  prominent in the lower lumbar spine.  Scoliotic curvature.  Grade 3  anterolisthesis L4 on 5 vertebral body.  No acute fracture.  No  dislocation.  No sclerotic bone lesions are identified.  SOFT TISSUES:  Unremarkable as visualized.  VASCULATURE:  Atherosclerotic disease.  No abdominal aortic aneurysm.  LYMPH NODES:  The prostate appears enlarged, however no seminal  vesicle enlargement or evidence of lymphadenopathy within the pelvis.    Impression  1.  No sclerotic bone lesions are identified.  2.  No suspicious pulmonary nodules.  3.  The prostate appears enlarged, however no seminal vesicle  enlargement or evidence of lymphadenopathy within the pelvis.  4.  Grade 3 anterolisthesis L4 on 5 vertebral body.  5.  Degenerative changes lumbar spine as described.      This report was finalized on 6/28/2024 12:08 PM by Dr. Dirk Dillon MD.       KUB: No results found for this or any previous visit.       Labs (past 3 months):      Procedure visit on 06/20/2024   Component Date Value Ref Range  Status    Reference Lab Report 2024    Final                    Value:[facility]  [address] [place] [address]  Phone: [phone] or [phone]  Fax: [phone]  Jose Gary M.D., Medical Director    PATIENT NAME                           LABORATORY NO.  [address] INGRAM                    [id number]  [id number]                         AGE              SEX  N           CLIENT REF #  [facility]      83      1940      [id number]   [id number]    [address]            REQUESTING M.D.     ATTENDING [place] [address]                   DWAYNE MINOR STEVEN CHRISTOPHER  DATE COLLECTED      DATE RECEIVED      DATE REPORTED  2024    DIAGNOSIS:  A.   PROSTATE, NEEDLE CORE BIOPSY, RIGHT APEX:  Prostatic acinar adenocarcinoma, grade group 4 (Patricia 4+4), involving  5% of specimen  B.   PROSTATE, NEEDLE CORE BIOPSY, RIGHT MID:  Benign prostatic stroma, negative for glandular elements  C.                             PROSTATE, NEEDLE CORE BIOPSY, RIGHT BASE:  Benign prostatic stroma, negative for glandular elements  D.   PROSTATE, NEEDLE CORE BIOPSY, RIGHT LATERAL MID:  Prostatic acinar adenocarcinoma, grade group 3 (Mission 4+3), involving  10% of specimen  Focal atrophy and chronic inflammation  E.   PROSTATE, NEEDLE CORE BIOPSY, RIGHT LATERAL BASE:  Benign prostatic tissue  F.   PROSTATE, NEEDLE CORE BIOPSY, LEFT MID:  Prostatic acinar adenocarcinoma, grade group 3 (Patricia 4+3), involving  2% of specimen  G.   PROSTATE, NEEDLE CORE BIOPSY, LEFT BASE:  Benign prostatic tissue with focal acute inflammation  H.   PROSTATE, NEEDLE CORE BIOPSY, LEFT LATERAL APEX:  Prostatic acinar adenocarcinoma, grade group 4 (Mission 4+4), involving  10% of specimen  I.   PROSTATE, NEEDLE CORE BIOPSY, LEFT LATERAL MID:  Benign prostatic tissue with focal chronic  "inflammation  J.   PROSTATE, NEEDLE CORE BIOPSY, LEFT LATERAL BASE:  Benign prostatic tissue    CAM    COMMENT:  Results are called to Zeny at Dr. Barnett's office by Dr. Luong  at 12:35 PM on 6/21/2024.    CLINICAL HISTORY:  Benign prostatic hyperplasia with urinary hesitancy    SPECIMENS RECEIVED:  A.  PROSTATE, NEEDLE CORE BIOPSY , RIGHT APEX  B.  PROSTATE, NEEDLE CORE BIOPSY , RIGHT MID  C.  PROSTATE, NEEDLE CORE BIOPSY , RIGHT BASE  D.  PROSTATE, NEEDLE CORE BIOPSY , RIGHT LATERAL MID  E.  PROSTATE, NEEDLE CORE BIOPSY , RIGHT LATERAL BASE  F.  PROSTATE, NEEDLE CORE BIOPSY , LEFT MID  G.  PROSTATE, NEEDLE CORE BIOPSY , LEFT BASE  H.  PROSTATE, NEEDLE CORE BIOPSY , LEFT LATERAL APEX  I.  PROSTATE, NEEDLE CORE BIOPSY , LEFT LATERAL MID  J.  PROSTATE, NEEDLE CORE BIOPSY , LEFT LATERAL BASE    MICROSCOPIC DESCRIPTION:  Tissue blocks are prepared and slides are examined microscopically on all  specimens. See diagnosis for details.    Professional interpretation rendered by Betzy Luong M.D., F.C.A.P. at  [employer] [employer], [address] [place] [address].    GROSS DESCRIPTION:  A.  Received in a cassette labeled \"right apex\".                            Consists of 1 tan needle core  measuring 0.4 cm in length and 0.1 cm in diameter and is submitted  entirely in 1 block.  LUPE  B.  Received in a cassette labeled \"right mid\".  Consists of 1 tan needle core  measuring 1.5 cm in length and 0.1 cm in diameter and is submitted  entirely in 1 block.  C.  Received in a cassette labeled \"right base\".  Consists of 1 tan needle core  measuring 1.0 cm in length and 0.1 cm in diameter and is submitted  entirely in 1 block.  D.  Received in a cassette labeled \"right lateral mid\".  Consists of 1 tan needle  core measuring 1.3 cm in length and 0.1 cm in diameter and is submitted  entirely in 1 block.  E.  Received in a cassette labeled \"right lateral base\".  Consists of 1 tan needle  core measuring 2.0 " "cm in length and 0.1 cm in diameter and is submitted  entirely in 1 block.  F.  Received in a cassette labeled \"left mid\".  Consists of 1 tan needle core  measuring 1.0 cm in length and 0.1 cm in diameter and is submitted  entirely in 1 block.  G.                            Received in a cassette labeled \"left base\".  Consists of 1 tan needle core  measuring 1.1 cm in length and 0.1 cm in diameter and is submitted  entirely in 1 block.  H.  Received in a cassette labeled \"left lateral apex\".  Consists of 1 tan needle  core measuring 1.8 cm in length and 0.1 cm in diameter and is submitted  entirely in 1 block.  I.  Received in a cassette labeled \"left lateral mid\".  Consists of 1 tan needle  core measuring 1.0 cm in length and 0.1 cm in diameter and is submitted  entirely in 1 block.  J.  Received in a cassette labeled \"left lateral base\".  Consists of 1 tan needle  core measuring 1.2 cm in length and 0.1 cm in diameter and is submitted  entirely in 1 block.    REVIEWED, DIAGNOSED AND ELECTRONICALLY  SIGNED BY:    Betzy Luong M.D., F.C.A.[facility]eria  CPT CODES:  88305x10     Lab on 05/23/2024   Component Date Value Ref Range Status    PSA 05/23/2024 9.570 (H)  0.000 - 4.000 ng/mL Final        Procedure:   Prostate fiducial marker placement: After an appropriate informed consent including the risk of anesthesia, bleeding, infection he had preoperative antibiotics including ciprofloxacin, clindamycin and gentamicin intra operatively.  He has also had a cleansing enema I used the GE multiplanar probe to visualize the prostate no measurements were taken.  I then anesthetized the rectal mucosa with 10 cc of 2% viscous Xylocaine jelly as well as a prostate block with a 22-gauge needle and 10 cc of 1% Xylocaine injecting it in the rectal wall submucosally.  I then placed a total of 6 fiduciary gold seeds in the prostate across the base and in the midportion.  No complications were encountered the procedure was tolerated " well.      Assessment/Plan:   Prostate cancer:  He returns today status post biopsy for extensive discussion of prostate cancer.  We discussed staging and grading of the disease.  I described with a Patricia system being from a 2 to10 scale with the most common low-grade pattern being a Patricia 6.  I discussed the staging workup including a total body bone scan and CT scan especially with a PSA greater than 10.  We discussed the various options at length. I discussed a radical retropubic prostatectomy done in the traditional fashion and using the robotic technique.  I then discussed radiation treatment both seed therapy and external beam therapy using Gold fiduciary markers.  We talked about some of the other alternatives such as a cryosurgical ablation at high intensity focused ultrasound as being viable alternatives but not recommended at this time.  He focused on aggressive watchful waiting and explained that currently low literature it's been discovered that a lot of men can actually observe it especially with numerous other comorbidities cannot have problems from the cancer by itself.  Talked about hormonal ablation and the side effects of creation of insulin resistance.  Overall, the patient was given appropriate literature, is going to think about it, and I'm going to revisit the topic with them after the next office visit.  Status post fiduciary markers                This document has been electronically signed by CHIKA MINOR MD July 18, 2024 08:45 EDT    Dictated Utilizing Dragon Dictation: Part of this note may be an electronic transcription/translation of spoken language to printed text using the Dragon Dictation System.

## 2024-07-25 ENCOUNTER — HOSPITAL ENCOUNTER (OUTPATIENT)
Dept: RADIATION ONCOLOGY | Facility: HOSPITAL | Age: 84
Setting detail: RADIATION/ONCOLOGY SERIES
Discharge: HOME | End: 2024-07-25
Payer: MEDICARE

## 2024-07-25 ENCOUNTER — OFFICE VISIT (OUTPATIENT)
Dept: RADIATION ONCOLOGY | Facility: HOSPITAL | Age: 84
End: 2024-07-25
Payer: MEDICARE

## 2024-07-25 DIAGNOSIS — C61 PROSTATE CANCER: Primary | ICD-10-CM

## 2024-07-25 RX ORDER — TAMSULOSIN HYDROCHLORIDE 0.4 MG/1
1 CAPSULE ORAL NIGHTLY
Qty: 30 CAPSULE | Refills: 11 | Status: SHIPPED | OUTPATIENT
Start: 2024-07-25

## 2024-07-25 NOTE — PROGRESS NOTES
RE-EVALUATION    PATIENT:                                                      Henry Olea MD  :                                                          1940  DATE:                          2024   DIAGNOSIS:     Prostate cancer  - Stage IIC (cT1c, cN0, cM0, PSA: 9.6, Grade Group: 4)    This visit has been converted to a telehealth virtual visit, the patient's preferred method for today's appointment.  Total time of discussion was 20 minutes.  The patient has given verbal consent.     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  He has fairly low volume of the highest tumor grade with Patricia 4+4 = 8 disease, and the other samples are intermediate risk Patricia score.  He has chosen to undergo definitive treatment with CyberKnife stereotactic body radiotherapy alone.  He tolerated gold seed fiducial placement without difficulty.  No change in voiding.  No hematuria.  No dysuria.  He has had no other change in health since informed consent was obtained on 2024.  He remains a good candidate for SBRT.      IPSS Questionnaire (AUA-7):  Over the past month…     1)  Incomplete Emptying  How often have you had a sensation of not emptying your bladder?  2 - Less than half the time   2)  Frequency  How often have you had to urinate less than every two hours? 1 - Less than 1 time in 5   3)  Intermittency  How often have you found you stopped and started again several times when you urinated?  2 - Less than half the time   4) Urgency  How often have you found it difficult to postpone urination?  3 - About half the time   5) Weak Stream  How often have you had a weak urinary stream?  2 - Less than half the time   6) Straining  How often have you had to push or strain to begin urination?  2 - Less than half the time   7) Nocturia  How many times did you typically get up at night to urinate?  3 - 3 times   Total Score:  14         Quality of life  due to urinary symptoms:  If you were to spend the rest of your life with your urinary condition the way it is now, how would you feel about that? 2-Mostly Satisfied   Urine Leakage (Incontinence) 1-Mild (A few drops a day, no pad use)      Sexual Health Inventory  Current Status     1)  How do you rate your confidence that you could achieve and keep an erection? 2-Low   2) When you had erections with sexual stimulation, how often were your erections hard enough for penetration (entering your partner)? 2-A few times (much less than half the time)   3)  During sexual intercourse, how often were you able to maintain your erection after you had penetrated (entered) into your partner? 0-Did not attempt intercourse   4) During sexual intercourse, how difficult was it to maintain your erection to completion of intercourse? 0-Did not attempt intercourse   5) When you attempted sexual intercourse, how often was it satisfactory to you? 0-No sexual activity   Total Score: 4         Bowel Health Inventory  Current Status: 0-No problems, no rectal bleeding, no discharge, less then 5 bowel movements a day          No Known Allergies    Review of Systems   Genitourinary:  Positive for frequency and nocturia.    Musculoskeletal:  Positive for back pain (radiculopathy right leg).   All other systems reviewed and are negative.          Objective   VITAL SIGNS: There were no vitals filed for this visit.         KPS 90%      Physical Exam  Pulmonary:      Respirations even, unlabored. No audible wheezing or cough.  Neurological:      A+Ox4, conversant, answers questions appropriately.  Psychiatric:     Judgement, affect, and decision-making WNL.    Limited physical exam as visit was conducted remotely via telephone.          The following portions of the patient's history were reviewed and updated as appropriate: allergies, current medications, past family history, past medical history, past social history, past surgical history, and  problem list.    Diagnoses and all orders for this visit:    Prostate cancer  -     tamsulosin (FLOMAX) 0.4 MG capsule 24 hr capsule; Take 1 capsule by mouth Every Night. Begin medication 2 days prior to starting radiation.      IMPRESSION:  Prostate cancer, Patricia's 4+4 = 8, clinical stage IIC (T1c, M0, M0), PSA 9.6 ng/ml on finasteride.     RECOMMENDATIONS:  The patient will return for treatment planning CT and MRI pelvis on 7/29/2024.  The prostate gland will receive 5 fractions of 7 Gray, delivered on the CyberKnife.    If possible, the patient would like to begin treatment mid-week, with final couple of treatments following a break over the weekend, in order to best accommodate his work schedule.  Alternatively, if this cannot be arranged, then an every other day treatment schedule would be recommended.  He verbalizes understanding of all recommended dietary and bowel prep, activity restrictions, and initiation of prophylactic alpha blocker.    A total of 35 minutes was spent on this encounter, with 20 minutes in virtual communication with the patient via telephone, and the remainder of the time spent in reviewing the relevant history, records, available imaging, and for documentation.     Kayley Keene, APRN

## 2024-07-29 ENCOUNTER — HOSPITAL ENCOUNTER (OUTPATIENT)
Dept: MRI IMAGING | Facility: HOSPITAL | Age: 84
Discharge: HOME | End: 2024-07-29
Admitting: RADIOLOGY
Payer: MEDICARE

## 2024-07-29 ENCOUNTER — HOSPITAL ENCOUNTER (OUTPATIENT)
Dept: RADIATION ONCOLOGY | Facility: HOSPITAL | Age: 84
Discharge: HOME | End: 2024-07-29
Payer: MEDICARE

## 2024-07-29 DIAGNOSIS — C61 PROSTATE CANCER: ICD-10-CM

## 2024-07-29 PROCEDURE — 77399 UNLISTED PX MED RADJ PHYSICS: CPT | Performed by: RADIOLOGY

## 2024-07-29 PROCEDURE — 72195 MRI PELVIS W/O DYE: CPT

## 2024-08-01 ENCOUNTER — HOSPITAL ENCOUNTER (OUTPATIENT)
Dept: RADIATION ONCOLOGY | Facility: HOSPITAL | Age: 84
Setting detail: RADIATION/ONCOLOGY SERIES
Discharge: HOME | End: 2024-08-01
Payer: MEDICARE

## 2024-08-06 PROCEDURE — 77338 DESIGN MLC DEVICE FOR IMRT: CPT | Performed by: RADIOLOGY

## 2024-08-06 PROCEDURE — 77300 RADIATION THERAPY DOSE PLAN: CPT | Performed by: RADIOLOGY

## 2024-08-06 PROCEDURE — 77301 RADIOTHERAPY DOSE PLAN IMRT: CPT | Performed by: RADIOLOGY

## 2024-08-15 ENCOUNTER — TRANSCRIBE ORDERS (OUTPATIENT)
Dept: ADMINISTRATIVE | Facility: HOSPITAL | Age: 84
End: 2024-08-15
Payer: MEDICARE

## 2024-08-15 ENCOUNTER — HOSPITAL ENCOUNTER (OUTPATIENT)
Dept: RADIATION ONCOLOGY | Facility: HOSPITAL | Age: 84
Discharge: HOME | End: 2024-08-15

## 2024-08-15 ENCOUNTER — HOSPITAL ENCOUNTER (OUTPATIENT)
Dept: GENERAL RADIOLOGY | Facility: HOSPITAL | Age: 84
Discharge: HOME | End: 2024-08-15
Admitting: INTERNAL MEDICINE
Payer: MEDICARE

## 2024-08-15 DIAGNOSIS — R07.89 OTHER CHEST PAIN: Primary | ICD-10-CM

## 2024-08-15 PROCEDURE — 71100 X-RAY EXAM RIBS UNI 2 VIEWS: CPT

## 2024-08-15 PROCEDURE — 77373 STRTCTC BDY RAD THER TX DLVR: CPT | Performed by: RADIOLOGY

## 2024-08-16 ENCOUNTER — HOSPITAL ENCOUNTER (OUTPATIENT)
Dept: RADIATION ONCOLOGY | Facility: HOSPITAL | Age: 84
Discharge: HOME | End: 2024-08-16

## 2024-08-16 PROCEDURE — 77373 STRTCTC BDY RAD THER TX DLVR: CPT | Performed by: RADIOLOGY

## 2024-08-19 ENCOUNTER — HOSPITAL ENCOUNTER (OUTPATIENT)
Dept: RADIATION ONCOLOGY | Facility: HOSPITAL | Age: 84
Discharge: HOME | End: 2024-08-19
Payer: MEDICARE

## 2024-08-19 PROCEDURE — 77373 STRTCTC BDY RAD THER TX DLVR: CPT | Performed by: RADIOLOGY

## 2024-08-20 ENCOUNTER — HOSPITAL ENCOUNTER (OUTPATIENT)
Dept: RADIATION ONCOLOGY | Facility: HOSPITAL | Age: 84
Discharge: HOME | End: 2024-08-20

## 2024-08-20 PROCEDURE — 77373 STRTCTC BDY RAD THER TX DLVR: CPT | Performed by: RADIOLOGY

## 2024-08-21 ENCOUNTER — HOSPITAL ENCOUNTER (OUTPATIENT)
Dept: RADIATION ONCOLOGY | Facility: HOSPITAL | Age: 84
Discharge: HOME | End: 2024-08-21

## 2024-08-21 PROCEDURE — 77373 STRTCTC BDY RAD THER TX DLVR: CPT | Performed by: RADIOLOGY

## 2024-08-21 PROCEDURE — 77336 RADIATION PHYSICS CONSULT: CPT | Performed by: RADIOLOGY

## 2024-08-22 ENCOUNTER — DOCUMENTATION (OUTPATIENT)
Dept: RADIATION ONCOLOGY | Facility: HOSPITAL | Age: 84
End: 2024-08-22
Payer: MEDICARE

## 2024-09-19 ENCOUNTER — HOSPITAL ENCOUNTER (OUTPATIENT)
Dept: RADIATION ONCOLOGY | Facility: HOSPITAL | Age: 84
Setting detail: RADIATION/ONCOLOGY SERIES
Discharge: HOME | End: 2024-09-19
Payer: MEDICARE

## 2024-09-19 ENCOUNTER — OFFICE VISIT (OUTPATIENT)
Dept: RADIATION ONCOLOGY | Facility: HOSPITAL | Age: 84
End: 2024-09-19
Payer: MEDICARE

## 2024-09-19 DIAGNOSIS — C61 PROSTATE CANCER: Primary | ICD-10-CM

## 2024-10-04 ENCOUNTER — HOSPITAL ENCOUNTER (EMERGENCY)
Facility: HOSPITAL | Age: 84
Discharge: HOME | End: 2024-10-04
Attending: STUDENT IN AN ORGANIZED HEALTH CARE EDUCATION/TRAINING PROGRAM
Payer: MEDICARE

## 2024-10-04 ENCOUNTER — APPOINTMENT (OUTPATIENT)
Dept: CT IMAGING | Facility: HOSPITAL | Age: 84
End: 2024-10-04
Payer: MEDICARE

## 2024-10-04 VITALS
TEMPERATURE: 97.8 F | WEIGHT: 166 LBS | RESPIRATION RATE: 20 BRPM | DIASTOLIC BLOOD PRESSURE: 85 MMHG | SYSTOLIC BLOOD PRESSURE: 128 MMHG | BODY MASS INDEX: 23.77 KG/M2 | HEIGHT: 70 IN | OXYGEN SATURATION: 99 % | HEART RATE: 54 BPM

## 2024-10-04 DIAGNOSIS — N20.1 URETEROLITHIASIS: Primary | ICD-10-CM

## 2024-10-04 LAB
ALBUMIN SERPL-MCNC: 4 G/DL (ref 3.5–5.2)
ALBUMIN/GLOB SERPL: 1.4 G/DL
ALP SERPL-CCNC: 85 U/L (ref 39–117)
ALT SERPL W P-5'-P-CCNC: 15 U/L (ref 1–41)
ANION GAP SERPL CALCULATED.3IONS-SCNC: 8.3 MMOL/L (ref 5–15)
AST SERPL-CCNC: 14 U/L (ref 1–40)
BACTERIA UR QL AUTO: ABNORMAL /HPF
BASOPHILS # BLD AUTO: 0.06 10*3/MM3 (ref 0–0.2)
BASOPHILS NFR BLD AUTO: 1.1 % (ref 0–1.5)
BILIRUB SERPL-MCNC: 0.7 MG/DL (ref 0–1.2)
BILIRUB UR QL STRIP: NEGATIVE
BUN SERPL-MCNC: 18 MG/DL (ref 8–23)
BUN/CREAT SERPL: 22.5 (ref 7–25)
CALCIUM SPEC-SCNC: 9.7 MG/DL (ref 8.6–10.5)
CHLORIDE SERPL-SCNC: 103 MMOL/L (ref 98–107)
CLARITY UR: CLEAR
CO2 SERPL-SCNC: 26.7 MMOL/L (ref 22–29)
COLOR UR: YELLOW
CREAT SERPL-MCNC: 0.8 MG/DL (ref 0.76–1.27)
D-LACTATE SERPL-SCNC: 1 MMOL/L (ref 0.5–2)
DEPRECATED RDW RBC AUTO: 46 FL (ref 37–54)
EGFRCR SERPLBLD CKD-EPI 2021: 87.8 ML/MIN/1.73
EOSINOPHIL # BLD AUTO: 0.29 10*3/MM3 (ref 0–0.4)
EOSINOPHIL NFR BLD AUTO: 5.5 % (ref 0.3–6.2)
ERYTHROCYTE [DISTWIDTH] IN BLOOD BY AUTOMATED COUNT: 12.8 % (ref 12.3–15.4)
GLOBULIN UR ELPH-MCNC: 2.9 GM/DL
GLUCOSE SERPL-MCNC: 122 MG/DL (ref 65–99)
GLUCOSE UR STRIP-MCNC: NEGATIVE MG/DL
HCT VFR BLD AUTO: 38.6 % (ref 37.5–51)
HGB BLD-MCNC: 12.4 G/DL (ref 13–17.7)
HGB UR QL STRIP.AUTO: ABNORMAL
HOLD SPECIMEN: NORMAL
HOLD SPECIMEN: NORMAL
HYALINE CASTS UR QL AUTO: ABNORMAL /LPF
IMM GRANULOCYTES # BLD AUTO: 0.01 10*3/MM3 (ref 0–0.05)
IMM GRANULOCYTES NFR BLD AUTO: 0.2 % (ref 0–0.5)
INR PPP: 0.95 (ref 0.9–1.1)
KETONES UR QL STRIP: NEGATIVE
LEUKOCYTE ESTERASE UR QL STRIP.AUTO: NEGATIVE
LIPASE SERPL-CCNC: 32 U/L (ref 13–60)
LYMPHOCYTES # BLD AUTO: 1.42 10*3/MM3 (ref 0.7–3.1)
LYMPHOCYTES NFR BLD AUTO: 26.8 % (ref 19.6–45.3)
MCH RBC QN AUTO: 31.2 PG (ref 26.6–33)
MCHC RBC AUTO-ENTMCNC: 32.1 G/DL (ref 31.5–35.7)
MCV RBC AUTO: 97 FL (ref 79–97)
MONOCYTES # BLD AUTO: 0.54 10*3/MM3 (ref 0.1–0.9)
MONOCYTES NFR BLD AUTO: 10.2 % (ref 5–12)
NEUTROPHILS NFR BLD AUTO: 2.98 10*3/MM3 (ref 1.7–7)
NEUTROPHILS NFR BLD AUTO: 56.2 % (ref 42.7–76)
NITRITE UR QL STRIP: NEGATIVE
NRBC BLD AUTO-RTO: 0 /100 WBC (ref 0–0.2)
PH UR STRIP.AUTO: 6.5 [PH] (ref 5–8)
PLATELET # BLD AUTO: 244 10*3/MM3 (ref 140–450)
PMV BLD AUTO: 8.9 FL (ref 6–12)
POTASSIUM SERPL-SCNC: 4.7 MMOL/L (ref 3.5–5.2)
PROT SERPL-MCNC: 6.9 G/DL (ref 6–8.5)
PROT UR QL STRIP: NEGATIVE
PROTHROMBIN TIME: 12.8 SECONDS (ref 12.1–14.7)
QT INTERVAL: 456 MS
QTC INTERVAL: 443 MS
RBC # BLD AUTO: 3.98 10*6/MM3 (ref 4.14–5.8)
RBC # UR STRIP: ABNORMAL /HPF
REF LAB TEST METHOD: ABNORMAL
SODIUM SERPL-SCNC: 138 MMOL/L (ref 136–145)
SP GR UR STRIP: 1.01 (ref 1–1.03)
SQUAMOUS #/AREA URNS HPF: ABNORMAL /HPF
TROPONIN T SERPL HS-MCNC: 10 NG/L
UROBILINOGEN UR QL STRIP: ABNORMAL
WBC # UR STRIP: ABNORMAL /HPF
WBC NRBC COR # BLD AUTO: 5.3 10*3/MM3 (ref 3.4–10.8)
WHOLE BLOOD HOLD COAG: NORMAL
WHOLE BLOOD HOLD SPECIMEN: NORMAL

## 2024-10-04 PROCEDURE — 83605 ASSAY OF LACTIC ACID: CPT | Performed by: STUDENT IN AN ORGANIZED HEALTH CARE EDUCATION/TRAINING PROGRAM

## 2024-10-04 PROCEDURE — 85610 PROTHROMBIN TIME: CPT | Performed by: STUDENT IN AN ORGANIZED HEALTH CARE EDUCATION/TRAINING PROGRAM

## 2024-10-04 PROCEDURE — 85025 COMPLETE CBC W/AUTO DIFF WBC: CPT | Performed by: STUDENT IN AN ORGANIZED HEALTH CARE EDUCATION/TRAINING PROGRAM

## 2024-10-04 PROCEDURE — 99285 EMERGENCY DEPT VISIT HI MDM: CPT

## 2024-10-04 PROCEDURE — 84484 ASSAY OF TROPONIN QUANT: CPT | Performed by: STUDENT IN AN ORGANIZED HEALTH CARE EDUCATION/TRAINING PROGRAM

## 2024-10-04 PROCEDURE — 96374 THER/PROPH/DIAG INJ IV PUSH: CPT

## 2024-10-04 PROCEDURE — 93005 ELECTROCARDIOGRAM TRACING: CPT | Performed by: STUDENT IN AN ORGANIZED HEALTH CARE EDUCATION/TRAINING PROGRAM

## 2024-10-04 PROCEDURE — 83690 ASSAY OF LIPASE: CPT | Performed by: STUDENT IN AN ORGANIZED HEALTH CARE EDUCATION/TRAINING PROGRAM

## 2024-10-04 PROCEDURE — 36415 COLL VENOUS BLD VENIPUNCTURE: CPT

## 2024-10-04 PROCEDURE — 93010 ELECTROCARDIOGRAM REPORT: CPT | Performed by: INTERNAL MEDICINE

## 2024-10-04 PROCEDURE — 25010000002 KETOROLAC TROMETHAMINE PER 15 MG: Performed by: STUDENT IN AN ORGANIZED HEALTH CARE EDUCATION/TRAINING PROGRAM

## 2024-10-04 PROCEDURE — 74177 CT ABD & PELVIS W/CONTRAST: CPT

## 2024-10-04 PROCEDURE — 25510000001 IOPAMIDOL 61 % SOLUTION: Performed by: STUDENT IN AN ORGANIZED HEALTH CARE EDUCATION/TRAINING PROGRAM

## 2024-10-04 PROCEDURE — 81001 URINALYSIS AUTO W/SCOPE: CPT | Performed by: STUDENT IN AN ORGANIZED HEALTH CARE EDUCATION/TRAINING PROGRAM

## 2024-10-04 PROCEDURE — 80053 COMPREHEN METABOLIC PANEL: CPT | Performed by: STUDENT IN AN ORGANIZED HEALTH CARE EDUCATION/TRAINING PROGRAM

## 2024-10-04 PROCEDURE — 25010000002 ONDANSETRON PER 1 MG: Performed by: STUDENT IN AN ORGANIZED HEALTH CARE EDUCATION/TRAINING PROGRAM

## 2024-10-04 PROCEDURE — 74177 CT ABD & PELVIS W/CONTRAST: CPT | Performed by: RADIOLOGY

## 2024-10-04 PROCEDURE — 96375 TX/PRO/DX INJ NEW DRUG ADDON: CPT

## 2024-10-04 RX ORDER — ONDANSETRON 2 MG/ML
4 INJECTION INTRAMUSCULAR; INTRAVENOUS ONCE
Status: COMPLETED | OUTPATIENT
Start: 2024-10-04 | End: 2024-10-04

## 2024-10-04 RX ORDER — IOPAMIDOL 612 MG/ML
100 INJECTION, SOLUTION INTRAVASCULAR
Status: COMPLETED | OUTPATIENT
Start: 2024-10-04 | End: 2024-10-04

## 2024-10-04 RX ORDER — TAMSULOSIN HYDROCHLORIDE 0.4 MG/1
1 CAPSULE ORAL DAILY
Qty: 30 CAPSULE | Refills: 0 | Status: SHIPPED | OUTPATIENT
Start: 2024-10-04

## 2024-10-04 RX ORDER — SODIUM CHLORIDE 0.9 % (FLUSH) 0.9 %
10 SYRINGE (ML) INJECTION AS NEEDED
Status: DISCONTINUED | OUTPATIENT
Start: 2024-10-04 | End: 2024-10-04 | Stop reason: HOSPADM

## 2024-10-04 RX ORDER — HYDROCODONE BITARTRATE AND ACETAMINOPHEN 5; 325 MG/1; MG/1
1 TABLET ORAL EVERY 8 HOURS PRN
Qty: 8 TABLET | Refills: 0 | Status: SHIPPED | OUTPATIENT
Start: 2024-10-04

## 2024-10-04 RX ORDER — TAMSULOSIN HYDROCHLORIDE 0.4 MG/1
0.4 CAPSULE ORAL ONCE
Status: COMPLETED | OUTPATIENT
Start: 2024-10-04 | End: 2024-10-04

## 2024-10-04 RX ORDER — CEPHALEXIN 500 MG/1
500 CAPSULE ORAL 2 TIMES DAILY
Qty: 13 CAPSULE | Refills: 0 | Status: SHIPPED | OUTPATIENT
Start: 2024-10-04

## 2024-10-04 RX ORDER — KETOROLAC TROMETHAMINE 30 MG/ML
15 INJECTION, SOLUTION INTRAMUSCULAR; INTRAVENOUS ONCE
Status: COMPLETED | OUTPATIENT
Start: 2024-10-04 | End: 2024-10-04

## 2024-10-04 RX ADMIN — KETOROLAC TROMETHAMINE 15 MG: 30 INJECTION, SOLUTION INTRAMUSCULAR; INTRAVENOUS at 10:54

## 2024-10-04 RX ADMIN — CEPHALEXIN 500 MG: 250 CAPSULE ORAL at 12:11

## 2024-10-04 RX ADMIN — IOPAMIDOL 70 ML: 612 INJECTION, SOLUTION INTRAVENOUS at 10:27

## 2024-10-04 RX ADMIN — TAMSULOSIN HYDROCHLORIDE 0.4 MG: 0.4 CAPSULE ORAL at 12:11

## 2024-10-04 RX ADMIN — ONDANSETRON 4 MG: 2 INJECTION INTRAMUSCULAR; INTRAVENOUS at 10:54

## 2024-10-04 NOTE — ED PROVIDER NOTES
Subjective   History of Present Illness  83-year-old male with past medical history of atrial fibrillation, prostate cancer, diverticulitis, and hypertension presents today with a primary complaint of left-sided flank pain and left lower quadrant abdominal pain.  Symptoms have been present since yesterday evening.  Intermittent nausea.  No vomiting.  No diarrhea.  No hematuria or hematemesis.  No hematochezia.  No obvious aggravating or alleviating factors.  Vitals stable.  Afebrile      Review of Systems   Gastrointestinal:  Positive for abdominal pain.   Genitourinary:  Positive for flank pain.   All other systems reviewed and are negative.      Past Medical History:   Diagnosis Date    Atrial fibrillation     Diverticulitis     Hypertension     Pre-diabetes     Prostate cancer     Skin cancer 2010    Melanoma       No Known Allergies    Past Surgical History:   Procedure Laterality Date    BACK SURGERY  2004    COLON RESECTION      COLONOSCOPY      cologuard per PCP    HUMERUS FRACTURE SURGERY      ROTATOR CUFF REPAIR Right 2000    SKIN CANCER EXCISION  2010    Melanoma resection       Family History   Problem Relation Age of Onset    Diverticulitis Mother     Rheum arthritis Mother     Diabetes Mother     Lung cancer Father     Heart disease Father     Heart disease Brother        Social History     Socioeconomic History    Marital status:    Tobacco Use    Smoking status: Former     Current packs/day: 0.00     Types: Cigarettes     Quit date:      Years since quittin.7     Passive exposure: Past    Smokeless tobacco: Never   Vaping Use    Vaping status: Never Used   Substance and Sexual Activity    Alcohol use: Yes     Comment: 3 oz     Drug use: No    Sexual activity: Defer           Objective   Physical Exam  Constitutional:       General: He is not in acute distress.     Appearance: He is well-developed. He is not ill-appearing.   HENT:      Head: Normocephalic and atraumatic.    Eyes:      Extraocular Movements: Extraocular movements intact.      Pupils: Pupils are equal, round, and reactive to light.   Neck:      Vascular: No JVD.   Cardiovascular:      Rate and Rhythm: Normal rate and regular rhythm.      Heart sounds: Normal heart sounds. No murmur heard.  Pulmonary:      Effort: No tachypnea, accessory muscle usage or respiratory distress.      Breath sounds: Normal breath sounds. No stridor. No decreased breath sounds, wheezing, rhonchi or rales.   Chest:      Chest wall: No deformity, tenderness or crepitus.   Abdominal:      General: Bowel sounds are normal.      Palpations: Abdomen is soft.      Tenderness: There is abdominal tenderness in the left lower quadrant. There is left CVA tenderness. There is no guarding or rebound.   Musculoskeletal:         General: Normal range of motion.      Cervical back: Normal range of motion and neck supple.      Right lower leg: No tenderness. No edema.      Left lower leg: No tenderness. No edema.   Lymphadenopathy:      Cervical: No cervical adenopathy.   Skin:     General: Skin is warm and dry.      Coloration: Skin is not cyanotic.      Findings: No ecchymosis or erythema.   Neurological:      General: No focal deficit present.      Mental Status: He is alert and oriented to person, place, and time.      Cranial Nerves: No cranial nerve deficit.      Motor: No weakness.   Psychiatric:         Mood and Affect: Mood normal. Mood is not anxious.         Behavior: Behavior normal. Behavior is not agitated.         Procedures           ED Course  ED Course as of 10/04/24 1204   Fri Oct 04, 2024   0936 EKG notes sinus bradycardia.  59 bpm.  No acute ST elevation.  QTc 469.  First-degree AV block noted.  .  Electronically signed by Wolfgang Vinson DO, 10/04/24, 9:36 AM EDT.   [SF]      ED Course User Index  [SF] Wolfgang Vinson DO                                 CT Abdomen Pelvis With Contrast    Result Date: 10/4/2024  1.  2.6 mm left UVJ  stone causing moderate left side obstructive uropathy. 2.  Prostate enlargement with prostate seed implants noted. 3.  Nonspecific urinary bladder wall thickening that could be related to chronic outlet obstruction. 4.  Cardiomegaly. 5.  Degenerative changes thoracolumbar spine with multilevel stenosis and spondylolisthesis. 6.  Diffuse fatty infiltration of liver. 7.  Moderate volume fecal retention in the colon consistent with constipation. 8.  Questionable cholelithiasis.   This report was finalized on 10/4/2024 11:18 AM by Dr. Дмитрий Guzman MD.         Results for orders placed or performed during the hospital encounter of 10/04/24   Comprehensive Metabolic Panel    Specimen: Blood   Result Value Ref Range    Glucose 122 (H) 65 - 99 mg/dL    BUN 18 8 - 23 mg/dL    Creatinine 0.80 0.76 - 1.27 mg/dL    Sodium 138 136 - 145 mmol/L    Potassium 4.7 3.5 - 5.2 mmol/L    Chloride 103 98 - 107 mmol/L    CO2 26.7 22.0 - 29.0 mmol/L    Calcium 9.7 8.6 - 10.5 mg/dL    Total Protein 6.9 6.0 - 8.5 g/dL    Albumin 4.0 3.5 - 5.2 g/dL    ALT (SGPT) 15 1 - 41 U/L    AST (SGOT) 14 1 - 40 U/L    Alkaline Phosphatase 85 39 - 117 U/L    Total Bilirubin 0.7 0.0 - 1.2 mg/dL    Globulin 2.9 gm/dL    A/G Ratio 1.4 g/dL    BUN/Creatinine Ratio 22.5 7.0 - 25.0    Anion Gap 8.3 5.0 - 15.0 mmol/L    eGFR 87.8 >60.0 mL/min/1.73   Lipase    Specimen: Blood   Result Value Ref Range    Lipase 32 13 - 60 U/L   Urinalysis With Microscopic If Indicated (No Culture) - Urine, Clean Catch    Specimen: Urine, Clean Catch   Result Value Ref Range    Color, UA Yellow Yellow, Straw    Appearance, UA Clear Clear    pH, UA 6.5 5.0 - 8.0    Specific Gravity, UA 1.015 1.005 - 1.030    Glucose, UA Negative Negative    Ketones, UA Negative Negative    Bilirubin, UA Negative Negative    Blood, UA Moderate (2+) (A) Negative    Protein, UA Negative Negative    Leuk Esterase, UA Negative Negative    Nitrite, UA Negative Negative    Urobilinogen, UA 0.2 E.U./dL  0.2 - 1.0 E.U./dL   Lactic Acid, Plasma    Specimen: Blood   Result Value Ref Range    Lactate 1.0 0.5 - 2.0 mmol/L   Protime-INR    Specimen: Blood   Result Value Ref Range    Protime 12.8 12.1 - 14.7 Seconds    INR 0.95 0.90 - 1.10   High Sensitivity Troponin T    Specimen: Blood   Result Value Ref Range    HS Troponin T 10 <22 ng/L   CBC Auto Differential    Specimen: Blood   Result Value Ref Range    WBC 5.30 3.40 - 10.80 10*3/mm3    RBC 3.98 (L) 4.14 - 5.80 10*6/mm3    Hemoglobin 12.4 (L) 13.0 - 17.7 g/dL    Hematocrit 38.6 37.5 - 51.0 %    MCV 97.0 79.0 - 97.0 fL    MCH 31.2 26.6 - 33.0 pg    MCHC 32.1 31.5 - 35.7 g/dL    RDW 12.8 12.3 - 15.4 %    RDW-SD 46.0 37.0 - 54.0 fl    MPV 8.9 6.0 - 12.0 fL    Platelets 244 140 - 450 10*3/mm3    Neutrophil % 56.2 42.7 - 76.0 %    Lymphocyte % 26.8 19.6 - 45.3 %    Monocyte % 10.2 5.0 - 12.0 %    Eosinophil % 5.5 0.3 - 6.2 %    Basophil % 1.1 0.0 - 1.5 %    Immature Grans % 0.2 0.0 - 0.5 %    Neutrophils, Absolute 2.98 1.70 - 7.00 10*3/mm3    Lymphocytes, Absolute 1.42 0.70 - 3.10 10*3/mm3    Monocytes, Absolute 0.54 0.10 - 0.90 10*3/mm3    Eosinophils, Absolute 0.29 0.00 - 0.40 10*3/mm3    Basophils, Absolute 0.06 0.00 - 0.20 10*3/mm3    Immature Grans, Absolute 0.01 0.00 - 0.05 10*3/mm3    nRBC 0.0 0.0 - 0.2 /100 WBC   Urinalysis, Microscopic Only - Urine, Clean Catch    Specimen: Urine, Clean Catch   Result Value Ref Range    RBC, UA 21-50 (A) None Seen, 0-2 /HPF    WBC, UA None Seen None Seen, 0-2 /HPF    Bacteria, UA None Seen None Seen /HPF    Squamous Epithelial Cells, UA None Seen None Seen, 0-2 /HPF    Hyaline Casts, UA None Seen None Seen /LPF    Methodology Automated Microscopy    ECG 12 Lead QT Measurement   Result Value Ref Range    QT Interval 456 ms    QTC Interval 443 ms   Green Top (Gel)   Result Value Ref Range    Extra Tube Hold for add-ons.    Lavender Top   Result Value Ref Range    Extra Tube hold for add-on    Gold Top - SST   Result Value Ref  Range    Extra Tube Hold for add-ons.    Light Blue Top   Result Value Ref Range    Extra Tube Hold for add-ons.                  Medical Decision Making  CBC and CMP are unremarkable.  Patient refused troponin.  EKG listed.  Urinalysis notes slight leukocyte count and hematuria.  CT imaging of the abdomen pelvis noted a 2.6 mm UVJ stone.  Patient will be started on tamsulosin and prophylactic Keflex.  Work up and results were discussed throughly with the patient.  The patient will be discharged for further monitoring and management with their PCP.  Red flags, warning signs, worsening symptoms, and when to return to the ER discussed with and understood by the patient.  Patient will follow up with their PCP in a timely manner.  Vitals stable at discharge.    Amount and/or Complexity of Data Reviewed  Labs: ordered. Decision-making details documented in ED Course.  Radiology: ordered. Decision-making details documented in ED Course.  ECG/medicine tests: ordered.    Risk  Prescription drug management.        Final diagnoses:   Ureterolithiasis       ED Disposition  ED Disposition       ED Disposition   Discharge    Condition   Stable    Comment   --               No follow-up provider specified.       Medication List        New Prescriptions      cephalexin 500 MG capsule  Commonly known as: KEFLEX  Take 1 capsule by mouth 2 (Two) Times a Day.     HYDROcodone-acetaminophen 5-325 MG per tablet  Commonly known as: NORCO  Take 1 tablet by mouth Every 8 (Eight) Hours As Needed for Moderate Pain.            Changed      * tamsulosin 0.4 MG capsule 24 hr capsule  Commonly known as: FLOMAX  Take 1 capsule by mouth Every Night. Begin medication 2 days prior to starting radiation.  What changed: Another medication with the same name was added. Make sure you understand how and when to take each.     * tamsulosin 0.4 MG capsule 24 hr capsule  Commonly known as: FLOMAX  Take 1 capsule by mouth Daily.  What changed: You were  already taking a medication with the same name, and this prescription was added. Make sure you understand how and when to take each.           * This list has 2 medication(s) that are the same as other medications prescribed for you. Read the directions carefully, and ask your doctor or other care provider to review them with you.                   Where to Get Your Medications        These medications were sent to [address] [address]      Hours: Monday to Friday 7 AM to 6 PM Phone: [phone]   cephalexin 500 MG capsule  HYDROcodone-acetaminophen 5-325 MG per tablet  tamsulosin 0.4 MG capsule 24 hr capsule            Wolfgang Vinson,   10/04/24 [id number]

## 2024-10-04 NOTE — ED NOTES
Patient refused second troponin due to first one being normal, provider made aware and okayed with second troponin not being drawn.

## 2024-10-08 LAB
QT INTERVAL: 474 MS
QTC INTERVAL: 469 MS

## 2024-10-14 ENCOUNTER — DOCUMENTATION (OUTPATIENT)
Dept: PSYCHIATRY | Facility: HOSPITAL | Age: 84
End: 2024-10-14
Payer: MEDICARE

## 2024-10-25 DIAGNOSIS — C61 PROSTATE CANCER: Primary | ICD-10-CM

## 2024-10-31 ENCOUNTER — LAB (OUTPATIENT)
Dept: LAB | Facility: HOSPITAL | Age: 84
End: 2024-10-31
Payer: MEDICARE

## 2024-10-31 LAB — PSA SERPL-MCNC: 3.29 NG/ML (ref 0–4)

## 2024-10-31 PROCEDURE — 84153 ASSAY OF PSA TOTAL: CPT | Performed by: UROLOGY

## 2024-11-04 ENCOUNTER — OFFICE VISIT (OUTPATIENT)
Dept: UROLOGY | Facility: CLINIC | Age: 84
End: 2024-11-04
Payer: MEDICARE

## 2024-11-04 VITALS
HEART RATE: 59 BPM | WEIGHT: 171 LBS | DIASTOLIC BLOOD PRESSURE: 69 MMHG | BODY MASS INDEX: 24.48 KG/M2 | HEIGHT: 70 IN | SYSTOLIC BLOOD PRESSURE: 122 MMHG

## 2024-11-04 DIAGNOSIS — C61 PROSTATE CANCER: Primary | ICD-10-CM

## 2024-11-04 PROCEDURE — 1160F RVW MEDS BY RX/DR IN RCRD: CPT | Performed by: UROLOGY

## 2024-11-04 PROCEDURE — 99213 OFFICE O/P EST LOW 20 MIN: CPT | Performed by: UROLOGY

## 2024-11-04 PROCEDURE — 1159F MED LIST DOCD IN RCRD: CPT | Performed by: UROLOGY

## 2024-11-05 ENCOUNTER — TRANSCRIBE ORDERS (OUTPATIENT)
Dept: ADMINISTRATIVE | Facility: HOSPITAL | Age: 84
End: 2024-11-05
Payer: MEDICARE

## 2024-11-05 DIAGNOSIS — M80.0B9D PATHOLOGICAL FRACTURE OF PELVIS DUE TO AGE-RELATED OSTEOPOROSIS WITH ROUTINE HEALING: ICD-10-CM

## 2024-11-05 DIAGNOSIS — M80.00XD AGE-RELATED OSTEOPOROSIS WITH CURRENT PATHOLOGICAL FRACTURE WITH ROUTINE HEALING, SUBSEQUENT ENCOUNTER: Primary | ICD-10-CM

## 2024-11-13 ENCOUNTER — DOCUMENTATION (OUTPATIENT)
Dept: PSYCHIATRY | Facility: CLINIC | Age: 84
End: 2024-11-13
Payer: MEDICARE

## 2024-11-13 NOTE — PROGRESS NOTES
Chief Complaint:      Chief Complaint   Patient presents with    Prostate Cancer       HPI:   83 y.o. male status post a course of radiotherapy he is doing fantastic his last PSA was 3.290 he has no blood in the stool or GI problems.  He reports no lower urinary tract symptomatology, particularly irritative symptoms such as frequency, urgency, dysuria, and obstructive symptomatology, particularly dribbling, hesitancy, and intermittency.  He had very mild urgency but he is doing great I will see him back in 3 months    Past Medical History:     Past Medical History:   Diagnosis Date    Atrial fibrillation 2021    Diverticulitis 2005    Hypertension     Pre-diabetes     Prostate cancer     Skin cancer 2010    Melanoma       Current Meds:     Current Outpatient Medications   Medication Sig Dispense Refill    amLODIPine (NORVASC) 10 MG tablet Take 1 tablet by mouth Daily. 90 tablet 2    aspirin 81 MG EC tablet Take 1 tablet by mouth 2 (Two) Times a Day.      celecoxib (CeleBREX) 200 MG capsule Take 1 capsule by mouth 2 (Two) Times a Day As Needed. 180 capsule 5    cephalexin (KEFLEX) 500 MG capsule Take 1 capsule by mouth 2 (Two) Times a Day. 13 capsule 0    ciprofloxacin (Cipro) 500 MG tablet Take 1 tablet by mouth twice a day before procedure 4 tablet 0    cyclobenzaprine (FLEXERIL) 10 MG tablet Take 1 tablet by mouth 3 (three) times daily as needed for muscle spasms for up to 10 days. 30 tablet 0    diazePAM (VALIUM) 10 MG tablet Take 1 tablet by mouth the night before procedure at bedtime and 1 tablet the morning of 1 hour prior to procedure. 2 tablet 0    finasteride (PROSCAR) 5 MG tablet Take 1 tablet by mouth Daily. 30 tablet 5    finasteride (PROSCAR) 5 MG tablet Take 1 tablet by mouth Daily. 90 tablet 3    flecainide (TAMBOCOR) 50 MG tablet Take ½ tablet by mouth 2 (Two) Times a Day. 30 tablet 5    GLUCOSAMINE-CHONDROITIN PO Take 1 tablet by mouth Daily.      HYDROcodone-acetaminophen (NORCO) 5-325 MG per  tablet Take 1 tablet by mouth Every 8 (Eight) Hours As Needed for Moderate Pain. 8 tablet 0    ibuprofen (ADVIL,MOTRIN) 600 MG tablet Take 1/2-1 tablet by mouth Daily As Needed. 100 tablet 1    melatonin 5 MG tablet tablet Take 1 tablet by mouth At Night As Needed.      multivitamin with minerals (MULTIVITAMIN ADULTS 50+ PO) Take 1 tablet by mouth Daily.      Omega-3 Fatty Acids (fish oil) 1000 MG capsule capsule Take 1 capsule by mouth Daily With Breakfast.      oxyCODONE-acetaminophen (Percocet) 7.5-325 MG per tablet Take 1 tablet by mouth Every 6 (Six) Hours As Needed. Take with docusate 250mg twice a day. 50 tablet 0    pantoprazole (PROTONIX) 40 MG EC tablet Take 1 tablet by mouth Daily. 90 tablet 4    Sodium Phosphates ([employer] Enema Disposable) 19-7 GM/118ML enema Use morning of the procedure 133 mL 0    tamsulosin (FLOMAX) 0.4 MG capsule 24 hr capsule Take 1 capsule by mouth Every Night. Begin medication 2 days prior to starting radiation. 30 capsule 11    tamsulosin (FLOMAX) 0.4 MG capsule 24 hr capsule Take 1 capsule by mouth Daily. 30 capsule 0    triamcinolone (KENALOG) 0.1 % paste Apply a small amount to the affected area after meals 2- 3 times every day. 5 g 1    vitamin C (ASCORBIC ACID) 250 MG tablet Take 1 tablet by mouth Daily.       No current facility-administered medications for this visit.        Allergies:      No Known Allergies     Past Surgical History:     Past Surgical History:   Procedure Laterality Date    BACK SURGERY  2004    COLON RESECTION      COLONOSCOPY      cologuard per PCP    HUMERUS FRACTURE SURGERY      ROTATOR CUFF REPAIR Right 2000    SKIN CANCER EXCISION  2010    Melanoma resection       Social History:     Social History     Socioeconomic History    Marital status:    Tobacco Use    Smoking status: Former     Current packs/day: 0.00     Types: Cigarettes     Quit date:      Years since quittin.8     Passive exposure: Past    Smokeless tobacco: Never    Vaping Use    Vaping status: Never Used   Substance and Sexual Activity    Alcohol use: Yes     Comment: 3 oz     Drug use: No    Sexual activity: Defer       Family History:     Family History   Problem Relation Age of Onset    Diverticulitis Mother     Rheum arthritis Mother     Diabetes Mother     Lung cancer Father     Heart disease Father     Heart disease Brother        Review of Systems:     Review of Systems   Constitutional: Negative.    HENT: Negative.     Eyes: Negative.    Respiratory: Negative.     Cardiovascular: Negative.    Gastrointestinal: Negative.    Endocrine: Negative.    Musculoskeletal: Negative.    Allergic/Immunologic: Negative.    Neurological: Negative.    Hematological: Negative.    Psychiatric/Behavioral: Negative.         Physical Exam:     Physical Exam  Vitals and nursing note reviewed.   Constitutional:       Appearance: He is well-developed.   HENT:      Head: Normocephalic and atraumatic.   Eyes:      Conjunctiva/sclera: Conjunctivae normal.      Pupils: Pupils are equal, round, and reactive to light.   Cardiovascular:      Rate and Rhythm: Normal rate and regular rhythm.      Heart sounds: Normal heart sounds.   Pulmonary:      Effort: Pulmonary effort is normal.      Breath sounds: Normal breath sounds.   Abdominal:      General: Bowel sounds are normal.      Palpations: Abdomen is soft.   Musculoskeletal:         General: Normal range of motion.      Cervical back: Normal range of motion.   Skin:     General: Skin is warm and dry.   Neurological:      Mental Status: He is alert and oriented to person, place, and time.      Deep Tendon Reflexes: Reflexes are normal and symmetric.   Psychiatric:         Behavior: Behavior normal.         Thought Content: Thought content normal.         Judgment: Judgment normal.         I have reviewed the following portions of the patient's history: Allergies, current medications, past family history, past medical history, past social history,  past surgical history, problem list, and ROS and confirm it is accurate.    Recent Image (CT and/or KUB):      CT Abdomen and Pelvis: No results found for this or any previous visit.       CT Stone Protocol: Results for orders placed during the hospital encounter of 06/28/24    CT Abdomen Pelvis Stone Protocol    Narrative  EXAM:  CT Abdomen and Pelvis Without Intravenous Contrast    EXAM DATE:  6/28/2024 11:42 AM    CLINICAL HISTORY:  prostate cancer; [id number] neoplasm of prostate    TECHNIQUE:  Axial computed tomography images of the abdomen and pelvis without  intravenous contrast.  Sagittal and coronal reformatted images were  created and reviewed.  This CT exam was performed using one or more of  the following dose reduction techniques:  automated exposure control,  adjustment of the mA and/or kV according to patient size, and/or use of  iterative reconstruction technique.    COMPARISON:  7/10/2015    FINDINGS:  LUNG BASES:  Unremarkable as visualized.  No consolidation.  No  suspicious pulmonary nodules.    ABDOMEN:  LIVER:  Unremarkable as visualized.  GALLBLADDER AND BILE DUCTS:  Unremarkable as visualized.  No calcified  stones.  No ductal dilation.  PANCREAS:  Unremarkable as visualized.  No ductal dilation.  SPLEEN:  Unremarkable as visualized.  No splenomegaly.  ADRENALS:  Unremarkable as visualized.  No mass.  KIDNEYS AND URETERS:  Unremarkable as visualized.  No obstructing  stones.  No hydronephrosis.  STOMACH AND BOWEL:  Unremarkable as visualized.  No obstruction.  No  mucosal thickening.    PELVIS:  APPENDIX:  No findings to suggest acute appendicitis.  BLADDER:  Unremarkable as visualized.  No stones.  REPRODUCTIVE:  Unremarkable as visualized.    ABDOMEN and PELVIS:  INTRAPERITONEAL SPACE:  Unremarkable as visualized.  No free air.  No  significant fluid collection.  BONES/JOINTS:  Degenerative disc disease throughout the lumbar spine.  Degenerative facet arthropathy throughout the lumbar  spine, most  prominent in the lower lumbar spine.  Scoliotic curvature.  Grade 3  anterolisthesis L4 on 5 vertebral body.  No acute fracture.  No  dislocation.  No sclerotic bone lesions are identified.  SOFT TISSUES:  Unremarkable as visualized.  VASCULATURE:  Atherosclerotic disease.  No abdominal aortic aneurysm.  LYMPH NODES:  The prostate appears enlarged, however no seminal  vesicle enlargement or evidence of lymphadenopathy within the pelvis.    Impression  1.  No sclerotic bone lesions are identified.  2.  No suspicious pulmonary nodules.  3.  The prostate appears enlarged, however no seminal vesicle  enlargement or evidence of lymphadenopathy within the pelvis.  4.  Grade 3 anterolisthesis L4 on 5 vertebral body.  5.  Degenerative changes lumbar spine as described.      This report was finalized on 6/28/2024 12:08 PM by Dr. Dirk Dillon MD.       KUB: No results found for this or any previous visit.       Labs (past 3 months):      Orders Only on 10/25/2024   Component Date Value Ref Range Status    PSA 10/31/2024 3.290  0.000 - 4.000 ng/mL Final   Admission on 10/04/2024, Discharged on 10/04/2024   Component Date Value Ref Range Status    Glucose 10/04/2024 122 (H)  65 - 99 mg/dL Final    BUN 10/04/2024 18  8 - 23 mg/dL Final    Creatinine 10/04/2024 0.80  0.76 - 1.27 mg/dL Final    Sodium 10/04/2024 138  136 - 145 mmol/L Final    Potassium 10/04/2024 4.7  3.5 - 5.2 mmol/L Final    Chloride 10/04/2024 103  98 - 107 mmol/L Final    CO2 10/04/2024 26.7  22.0 - 29.0 mmol/L Final    Calcium 10/04/2024 9.7  8.6 - 10.5 mg/dL Final    Total Protein 10/04/2024 6.9  6.0 - 8.5 g/dL Final    Albumin 10/04/2024 4.0  3.5 - 5.2 g/dL Final    ALT (SGPT) 10/04/2024 15  1 - 41 U/L Final    AST (SGOT) 10/04/2024 14  1 - 40 U/L Final    Alkaline Phosphatase 10/04/2024 85  39 - 117 U/L Final    Total Bilirubin 10/04/2024 0.7  0.0 - 1.2 mg/dL Final    Globulin 10/04/2024 2.9  gm/dL Final    A/G Ratio 10/04/2024 1.4  g/dL  Final    BUN/Creatinine Ratio 10/04/2024 22.5  7.0 - 25.0 Final    Anion Gap 10/04/2024 8.3  5.0 - 15.0 mmol/L Final    eGFR 10/04/2024 87.8  >60.0 mL/min/1.73 Final    Lipase 10/04/2024 32  13 - 60 U/L Final    Color, UA 10/04/2024 Yellow  Yellow, Straw Final    Appearance, UA 10/04/2024 Clear  Clear Final    pH, UA 10/04/2024 6.5  5.0 - 8.0 Final    Specific Gravity, UA 10/04/2024 1.015  1.005 - 1.030 Final    Glucose, UA 10/04/2024 Negative  Negative Final    Ketones, UA 10/04/2024 Negative  Negative Final    Bilirubin, UA 10/04/2024 Negative  Negative Final    Blood, UA 10/04/2024 Moderate (2+) (A)  Negative Final    Protein, UA 10/04/2024 Negative  Negative Final    Leuk Esterase, UA 10/04/2024 Negative  Negative Final    Nitrite, UA 10/04/2024 Negative  Negative Final    Urobilinogen, UA 10/04/2024 0.2 E.U./dL  0.2 - 1.0 E.U./dL Final    Lactate 10/04/2024 1.0  0.5 - 2.0 mmol/L Final    Protime 10/04/2024 12.8  12.1 - 14.7 Seconds Final    INR 10/04/2024 0.95  0.90 - 1.10 Final    QT Interval 10/04/2024 456  ms Final    QTC Interval 10/04/2024 443  ms Final    HS Troponin T 10/04/2024 10  <22 ng/L Final    Extra Tube 10/04/2024 Hold for add-ons.   Final    Auto resulted.    Extra Tube 10/04/2024 hold for add-on   Final    Auto resulted    Extra Tube 10/04/2024 Hold for add-ons.   Final    Auto resulted.    Extra Tube 10/04/2024 Hold for add-ons.   Final    Auto resulted    WBC 10/04/2024 5.30  3.40 - 10.80 10*3/mm3 Final    RBC 10/04/2024 3.98 (L)  4.14 - 5.80 10*6/mm3 Final    Hemoglobin 10/04/2024 12.4 (L)  13.0 - 17.7 g/dL Final    Hematocrit 10/04/2024 38.6  37.5 - 51.0 % Final    MCV 10/04/2024 97.0  79.0 - 97.0 fL Final    MCH 10/04/2024 31.2  26.6 - 33.0 pg Final    MCHC 10/04/2024 32.1  31.5 - 35.7 g/dL Final    RDW 10/04/2024 12.8  12.3 - 15.4 % Final    RDW-SD 10/04/2024 46.0  37.0 - 54.0 fl Final    MPV 10/04/2024 8.9  6.0 - 12.0 fL Final    Platelets 10/04/2024 244  140 - 450 10*3/mm3 Final     Neutrophil % 10/04/2024 56.2  42.7 - 76.0 % Final    Lymphocyte % 10/04/2024 26.8  19.6 - 45.3 % Final    Monocyte % 10/04/2024 10.2  5.0 - 12.0 % Final    Eosinophil % 10/04/2024 5.5  0.3 - 6.2 % Final    Basophil % 10/04/2024 1.1  0.0 - 1.5 % Final    Immature Grans % 10/04/2024 0.2  0.0 - 0.5 % Final    Neutrophils, Absolute 10/04/2024 2.98  1.70 - 7.00 10*3/mm3 Final    Lymphocytes, Absolute 10/04/2024 1.42  0.70 - 3.10 10*3/mm3 Final    Monocytes, Absolute 10/04/2024 0.54  0.10 - 0.90 10*3/mm3 Final    Eosinophils, Absolute 10/04/2024 0.29  0.00 - 0.40 10*3/mm3 Final    Basophils, Absolute 10/04/2024 0.06  0.00 - 0.20 10*3/mm3 Final    Immature Grans, Absolute 10/04/2024 0.01  0.00 - 0.05 10*3/mm3 Final    nRBC 10/04/2024 0.0  0.0 - 0.2 /100 WBC Final    RBC, UA 10/04/2024 21-50 (A)  None Seen, 0-2 /HPF Final    WBC, UA 10/04/2024 None Seen  None Seen, 0-2 /HPF Final    Bacteria, UA 10/04/2024 None Seen  None Seen /HPF Final    Squamous Epithelial Cells, UA 10/04/2024 None Seen  None Seen, 0-2 /HPF Final    Hyaline Casts, UA 10/04/2024 None Seen  None Seen /LPF Final    Methodology 10/04/2024 Automated Microscopy   Final    QT Interval 10/04/2024 474  ms Final    QTC Interval 10/04/2024 469  ms Final        Procedure:       Assessment/Plan:   Prostate cancer:  He returns today status post biopsy for extensive discussion of prostate cancer.  We discussed staging and grading of the disease.  I described with a Patricia system being from a 2 to10 scale with the most common low-grade pattern being a [place] 6.  I discussed the staging workup including a total body bone scan and CT scan especially with a PSA greater than 10.  We discussed the various options at length. I discussed a radical retropubic prostatectomy done in the traditional fashion and using the robotic technique.  I then discussed radiation treatment both seed therapy and external beam therapy using Gold fiduciary markers.  We talked about some of the  other alternatives such as a cryosurgical ablation at high intensity focused ultrasound as being viable alternatives but not recommended at this time.  He focused on aggressive watchful waiting and explained that currently low literature it's been discovered that a lot of men can actually observe it especially with numerous other comorbidities cannot have problems from the cancer by itself.  Talked about hormonal ablation and the side effects of creation of insulin resistance.  Overall, the patient was given appropriate literature, is going to think about it, and I'm going to revisit the topic with them after the next office visit.  Status post very successful radiotherapy PSA is stable and consistent with bounce    Patient reports that he is not currently experiencing any symptoms of urinary incontinence.      BMI is within normal parameters. No other follow-up for BMI required.              This document has been electronically signed by CHIKA MINOR MD November 13, 2024 07:04 EST    Dictated Utilizing Dragon Dictation: Part of this note may be an electronic transcription/translation of spoken language to printed text using the Dragon Dictation System.

## 2024-11-15 ENCOUNTER — HOSPITAL ENCOUNTER (OUTPATIENT)
Dept: BONE DENSITY | Facility: HOSPITAL | Age: 84
Discharge: HOME | End: 2024-11-15
Payer: MEDICARE

## 2024-11-15 DIAGNOSIS — M80.0B9D PATHOLOGICAL FRACTURE OF PELVIS DUE TO AGE-RELATED OSTEOPOROSIS WITH ROUTINE HEALING: ICD-10-CM

## 2024-11-15 DIAGNOSIS — M80.00XD AGE-RELATED OSTEOPOROSIS WITH CURRENT PATHOLOGICAL FRACTURE WITH ROUTINE HEALING, SUBSEQUENT ENCOUNTER: ICD-10-CM

## 2024-11-15 PROCEDURE — 77080 DXA BONE DENSITY AXIAL: CPT

## 2024-11-15 PROCEDURE — 77080 DXA BONE DENSITY AXIAL: CPT | Performed by: RADIOLOGY

## 2024-11-18 ENCOUNTER — OFFICE VISIT (OUTPATIENT)
Dept: CARDIOLOGY | Facility: CLINIC | Age: 84
End: 2024-11-18
Payer: MEDICARE

## 2024-11-18 VITALS
OXYGEN SATURATION: 95 % | SYSTOLIC BLOOD PRESSURE: 114 MMHG | RESPIRATION RATE: 16 BRPM | HEIGHT: 70 IN | BODY MASS INDEX: 24.74 KG/M2 | DIASTOLIC BLOOD PRESSURE: 60 MMHG | HEART RATE: 51 BPM | WEIGHT: 172.8 LBS

## 2024-11-18 DIAGNOSIS — I48.0 PAROXYSMAL ATRIAL FIBRILLATION: Primary | ICD-10-CM

## 2024-11-18 DIAGNOSIS — I10 ESSENTIAL HYPERTENSION: ICD-10-CM

## 2024-11-18 PROCEDURE — 93000 ELECTROCARDIOGRAM COMPLETE: CPT | Performed by: INTERNAL MEDICINE

## 2024-11-18 PROCEDURE — 99214 OFFICE O/P EST MOD 30 MIN: CPT | Performed by: INTERNAL MEDICINE

## 2024-11-18 NOTE — PROGRESS NOTES
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 twice daily.  He has been chronically bradycardic with sinus bradycardia (asymptomatic) and has not on a beta-blocker.  He takes amlodipine 10 mg daily hypertension.  On today's visit he denies any complaints of palpitations, dizziness or syncope.  States his blood pressure has been running lower lately since he got started on Vibegron.    No Known Allergies:    Current Outpatient Medications:     amLODIPine (NORVASC) 10 MG tablet, Take 1 tablet by mouth Daily., Disp: 90 tablet, Rfl: 2    aspirin 81 MG EC tablet, Take 1 tablet by mouth 2 (Two) Times a Day., Disp: , Rfl:     cyclobenzaprine (FLEXERIL) 10 MG tablet, Take 1 tablet by mouth 3 (three) times daily as needed for muscle spasms for up to 10 days., Disp: 30 tablet, Rfl: 0    flecainide (TAMBOCOR) 50 MG tablet, Take ½ tablet by mouth 2 (Two) Times a Day., Disp: 30 tablet, Rfl: 5    GLUCOSAMINE-CHONDROITIN PO, Take 1 tablet by mouth Daily., Disp: , Rfl:     melatonin 5 MG tablet tablet, Take 1 tablet by mouth At Night As Needed., Disp: , Rfl:     multivitamin with minerals (MULTIVITAMIN ADULTS 50+ PO), Take 1 tablet by mouth Daily., Disp: , Rfl:     Omega-3 Fatty Acids (fish oil) 1000 MG capsule capsule, Take 1 capsule by mouth Daily With Breakfast., Disp: , Rfl:     pantoprazole (PROTONIX) 40 MG EC tablet,  "Take 1 tablet by mouth Daily., Disp: 90 tablet, Rfl: 4    tamsulosin (FLOMAX) 0.4 MG capsule 24 hr capsule, Take 1 capsule by mouth Every Night. Begin medication 2 days prior to starting radiation., Disp: 30 capsule, Rfl: 11    tamsulosin (FLOMAX) 0.4 MG capsule 24 hr capsule, Take 1 capsule by mouth Daily., Disp: 30 capsule, Rfl: 0    Vibegron 75 MG tablet, Take 1 tablet by mouth Daily., Disp: , Rfl:     vitamin C (ASCORBIC ACID) 250 MG tablet, Take 1 tablet by mouth Daily., Disp: , Rfl:     The following portions of the patient's history were reviewed and updated as appropriate: allergies, current medications, past family history, past medical history, past social history, past surgical history and problem list.    Social History     Tobacco Use    Smoking status: Former     Current packs/day: 0.00     Types: Cigarettes     Quit date:      Years since quittin.9     Passive exposure: Past    Smokeless tobacco: Never   Vaping Use    Vaping status: Never Used   Substance Use Topics    Alcohol use: Yes     Comment: 3 oz     Drug use: No     Review of Systems   Constitutional: Negative for chills and fever.   HENT:  Negative for nosebleeds and sore throat.    Respiratory:  Negative for cough, hemoptysis and wheezing.    Gastrointestinal:  Negative for abdominal pain, hematemesis, hematochezia, melena, nausea and vomiting.   Genitourinary:  Negative for dysuria and hematuria.   Neurological:  Negative for headaches.     Objective   Vitals:    24 1540   BP: 114/60   Pulse: 51   Resp: 16   SpO2: 95%   Weight: 78.4 kg (172 lb 12.8 oz)   Height: 177.8 cm (70\")     Body mass index is 24.79 kg/m².    Vitals reviewed.   Constitutional:       Appearance: Well-developed.   Eyes:      Conjunctiva/sclera: Conjunctivae normal.   HENT:      Head: Normocephalic.   Neck:      Thyroid: No thyromegaly.      Vascular: No JVD.      Trachea: No tracheal deviation.   Pulmonary:      Effort: No respiratory distress.      " "Breath sounds: Normal breath sounds. No wheezing. No rales.   Cardiovascular:      PMI at left midclavicular line. Normal rate. Regular rhythm. Normal S1. Normal S2.       Murmurs: There is no murmur.      No gallop.  No click. No rub.   Pulses:     Intact distal pulses.   Edema:     Peripheral edema absent.   Abdominal:      General: Bowel sounds are normal.      Palpations: Abdomen is soft. There is no abdominal mass.      Tenderness: There is no abdominal tenderness.   Musculoskeletal:      Cervical back: Normal range of motion and neck supple. Skin:     General: Skin is warm and dry.   Neurological:      Mental Status: Alert and oriented to person, place, and time.      Cranial Nerves: No cranial nerve deficit.       Lab Results   Component Value Date     10/04/2024    K 4.7 10/04/2024     10/04/2024    CO2 26.7 10/04/2024    BUN 18 10/04/2024    CREATININE 0.80 10/04/2024    GLUCOSE 122 (H) 10/04/2024    CALCIUM 9.7 10/04/2024    AST 14 10/04/2024    ALT 15 10/04/2024    ALKPHOS 85 10/04/2024    LABIL2 1.6 06/03/2016     No results found for: \"CKTOTAL\"  Lab Results   Component Value Date    WBC 5.30 10/04/2024    HGB 12.4 (L) 10/04/2024    HCT 38.6 10/04/2024     10/04/2024     Lab Results   Component Value Date    INR 0.95 10/04/2024     Lab Results   Component Value Date    MG 2.3 08/08/2023     Lab Results   Component Value Date    TSH 4.610 (H) 02/16/2024    PSA 3.290 10/31/2024    CHLPL 191 06/03/2016    TRIG 47 02/16/2024    HDL 76 (H) 02/16/2024    LDL 84 02/16/2024      No results found for: \"BNP\"    ECG 12 Lead    Date/Time: 11/18/2024 3:46 PM  Performed by: Sinan Nam MD    Authorized by: Sinan Nam MD  Comparison: compared with previous ECG from 10/7/2024  Similar to previous ECG  Rhythm: sinus bradycardia  BPM: 51  Conduction: 1st degree AV block  Comments: QTc: 467 ms          Assessment & Plan    Diagnosis Plan   1. Paroxysmal , maintaining sinus bradycardia (chronic " and asymptomatic), clinically asymptomatic and stable on flecainide therapy.  [id number] score is 3 for his age and hypertension.        2. Essential hypertension with relatively low blood pressures.          Recommendations  Since his blood pressure is running low, I have asked him to cut back on the amlodipine to 5 mg daily and see how he does.  I have asked Dr. Oela to keep a close check on his blood pressure at home and if it stays consistently above 130 systolic, may have to go back up on the amlodipine to 10 mg a day.  Continue with flecainide at 25 mg p.o. twice daily for now since this seem to be helping to keep him in sinus rhythm.    Return in about 6 months (around 5/18/2025).    As always, Jimmy Zhang MD  I appreciate very much the opportunity to participate in the cardiovascular care of your patients. Please do not hesitate to call me with any questions with regards to Henry Olea MD's evaluation and management.       With Best Regards,        Sinan Nam MD, [facility]    Please note that portions of this note were completed with a voice recognition program.

## 2024-11-18 NOTE — LETTER
November 18, 2024     Jimmy Zhang MD  [address] [place] [address]    Patient: Henry Olea MD   YOB: 1940   Date of Visit: 11/18/2024     Dear Jimmy Zhang MD:       Thank you for referring Henry Olea to me for evaluation. Below are the relevant portions of my assessment and plan of care.    If you have questions, please do not hesitate to call me. I look forward to following Henry along with you.         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 twice daily.  He has been chronically bradycardic with sinus bradycardia (asymptomatic) and has not on a beta-blocker.  He takes amlodipine 10 mg daily hypertension.  On today's visit he denies any complaints of palpitations, dizziness or syncope.  States his blood pressure has been running lower lately since he got started on Vibegron.    No Known Allergies:    Current Outpatient Medications:   •  amLODIPine (NORVASC) 10 MG tablet, Take 1 tablet by mouth Daily., Disp: 90 tablet, Rfl: 2  •  aspirin 81 MG EC tablet, Take 1 tablet by mouth 2 (Two) Times a Day., Disp: , Rfl:   •  cyclobenzaprine (FLEXERIL) 10 MG tablet, Take 1 tablet by mouth 3 (three) times daily as needed for muscle spasms for  up to 10 days., Disp: 30 tablet, Rfl: 0  •  flecainide (TAMBOCOR) 50 MG tablet, Take ½ tablet by mouth 2 (Two) Times a Day., Disp: 30 tablet, Rfl: 5  •  GLUCOSAMINE-CHONDROITIN PO, Take 1 tablet by mouth Daily., Disp: , Rfl:   •  melatonin 5 MG tablet tablet, Take 1 tablet by mouth At Night As Needed., Disp: , Rfl:   •  multivitamin with minerals (MULTIVITAMIN ADULTS 50+ PO), Take 1 tablet by mouth Daily., Disp: , Rfl:   •  Omega-3 Fatty Acids (fish oil) 1000 MG capsule capsule, Take 1 capsule by mouth Daily With Breakfast., Disp: , Rfl:   •  pantoprazole (PROTONIX) 40 MG EC tablet, Take 1 tablet by mouth Daily., Disp: 90 tablet, Rfl: 4  •  tamsulosin (FLOMAX) 0.4 MG capsule 24 hr capsule, Take 1 capsule by mouth Every Night. Begin medication 2 days prior to starting radiation., Disp: 30 capsule, Rfl: 11  •  tamsulosin (FLOMAX) 0.4 MG capsule 24 hr capsule, Take 1 capsule by mouth Daily., Disp: 30 capsule, Rfl: 0  •  Vibegron 75 MG tablet, Take 1 tablet by mouth Daily., Disp: , Rfl:   •  vitamin C (ASCORBIC ACID) 250 MG tablet, Take 1 tablet by mouth Daily., Disp: , Rfl:     The following portions of the patient's history were reviewed and updated as appropriate: allergies, current medications, past family history, past medical history, past social history, past surgical history and problem list.    Social History     Tobacco Use   • Smoking status: Former     Current packs/day: 0.00     Types: Cigarettes     Quit date:      Years since quittin.9     Passive exposure: Past   • Smokeless tobacco: Never   Vaping Use   • Vaping status: Never Used   Substance Use Topics   • Alcohol use: Yes     Comment: 3 oz    • Drug use: No     Review of Systems   Constitutional: Negative for chills and fever.   HENT:  Negative for nosebleeds and sore throat.    Respiratory:  Negative for cough, hemoptysis and wheezing.    Gastrointestinal:  Negative for abdominal pain, hematemesis, hematochezia, melena, nausea and vomiting.  "  Genitourinary:  Negative for dysuria and hematuria.   Neurological:  Negative for headaches.     Objective   Vitals:    11/18/24 1540   BP: 114/60   Pulse: 51   Resp: 16   SpO2: 95%   Weight: 78.4 kg (172 lb 12.8 oz)   Height: 177.8 cm (70\")     Body mass index is 24.79 kg/m².    Vitals reviewed.   Constitutional:       Appearance: Well-developed.   Eyes:      Conjunctiva/sclera: Conjunctivae normal.   HENT:      Head: Normocephalic.   Neck:      Thyroid: No thyromegaly.      Vascular: No JVD.      Trachea: No tracheal deviation.   Pulmonary:      Effort: No respiratory distress.      Breath sounds: Normal breath sounds. No wheezing. No rales.   Cardiovascular:      PMI at left midclavicular line. Normal rate. Regular rhythm. Normal S1. Normal S2.       Murmurs: There is no murmur.      No gallop.  No click. No rub.   Pulses:     Intact distal pulses.   Edema:     Peripheral edema absent.   Abdominal:      General: Bowel sounds are normal.      Palpations: Abdomen is soft. There is no abdominal mass.      Tenderness: There is no abdominal tenderness.   Musculoskeletal:      Cervical back: Normal range of motion and neck supple. Skin:     General: Skin is warm and dry.   Neurological:      Mental Status: Alert and oriented to person, place, and time.      Cranial Nerves: No cranial nerve deficit.       Lab Results   Component Value Date     10/04/2024    K 4.7 10/04/2024     10/04/2024    CO2 26.7 10/04/2024    BUN 18 10/04/2024    CREATININE 0.80 10/04/2024    GLUCOSE 122 (H) 10/04/2024    CALCIUM 9.7 10/04/2024    AST 14 10/04/2024    ALT 15 10/04/2024    ALKPHOS 85 10/04/2024    LABIL2 1.6 06/03/2016     No results found for: \"CKTOTAL\"  Lab Results   Component Value Date    WBC 5.30 10/04/2024    HGB 12.4 (L) 10/04/2024    HCT 38.6 10/04/2024     10/04/2024     Lab Results   Component Value Date    INR 0.95 10/04/2024     Lab Results   Component Value Date    MG 2.3 08/08/2023     Lab Results " "  Component Value Date    TSH 4.610 (H) 02/16/2024    PSA 3.290 10/31/2024    CHLPL 191 06/03/2016    TRIG 47 02/16/2024    HDL 76 (H) 02/16/2024    LDL 84 02/16/2024      No results found for: \"BNP\"    ECG 12 Lead    Date/Time: 11/18/2024 3:46 PM  Performed by: Sinan Nam MD    Authorized by: Sinan Nam MD  Comparison: compared with previous ECG from 10/7/2024  Similar to previous ECG  Rhythm: sinus bradycardia  BPM: 51  Conduction: 1st degree AV block  Comments: QTc: 467 ms          Assessment & Plan    Diagnosis Plan   1. Paroxysmal , maintaining sinus bradycardia (chronic and asymptomatic), clinically asymptomatic and stable on flecainide therapy.  [id number] score is 3 for his age and hypertension.        2. Essential hypertension with relatively low blood pressures.          Recommendations  Since his blood pressure is running low, I have asked him to cut back on the amlodipine to 5 mg daily and see how he does.  I have asked Dr. Olea to keep a close check on his blood pressure at home and if it stays consistently above 130 systolic, may have to go back up on the amlodipine to 10 mg a day.  Continue with flecainide at 25 mg p.o. twice daily for now since this seem to be helping to keep him in sinus rhythm.    Return in about 6 months (around 5/18/2025).    As always, Rafi I appreciate very much the opportunity to participate in the cardiovascular care of your patients. Please do not hesitate to call me with any questions with regards to Henry Olea MD's evaluation and management.       With Best Regards,        Sinan Nam MD, [facility]    Please note that portions of this note were completed with a voice recognition program.          "

## 2024-12-04 ENCOUNTER — LAB (OUTPATIENT)
Dept: LAB | Facility: HOSPITAL | Age: 84
End: 2024-12-04
Payer: MEDICARE

## 2024-12-04 ENCOUNTER — TRANSCRIBE ORDERS (OUTPATIENT)
Dept: ADMINISTRATIVE | Facility: HOSPITAL | Age: 84
End: 2024-12-04
Payer: MEDICARE

## 2024-12-04 DIAGNOSIS — R05.1 ACUTE COUGH: Primary | ICD-10-CM

## 2024-12-04 DIAGNOSIS — R05.1 ACUTE COUGH: ICD-10-CM

## 2024-12-04 DIAGNOSIS — R09.89 ABNORMAL CHEST SOUNDS: ICD-10-CM

## 2024-12-04 DIAGNOSIS — R06.02 SHORTNESS OF BREATH: ICD-10-CM

## 2024-12-04 LAB
B PARAPERT DNA SPEC QL NAA+PROBE: NOT DETECTED
B PERT DNA SPEC QL NAA+PROBE: NOT DETECTED
C PNEUM DNA NPH QL NAA+NON-PROBE: NOT DETECTED
FLUAV SUBTYP SPEC NAA+PROBE: NOT DETECTED
FLUBV RNA ISLT QL NAA+PROBE: NOT DETECTED
HADV DNA SPEC NAA+PROBE: NOT DETECTED
HCOV 229E RNA SPEC QL NAA+PROBE: NOT DETECTED
HCOV HKU1 RNA SPEC QL NAA+PROBE: NOT DETECTED
HCOV NL63 RNA SPEC QL NAA+PROBE: NOT DETECTED
HCOV OC43 RNA SPEC QL NAA+PROBE: NOT DETECTED
HMPV RNA NPH QL NAA+NON-PROBE: NOT DETECTED
HPIV1 RNA ISLT QL NAA+PROBE: NOT DETECTED
HPIV2 RNA SPEC QL NAA+PROBE: NOT DETECTED
HPIV3 RNA NPH QL NAA+PROBE: NOT DETECTED
HPIV4 P GENE NPH QL NAA+PROBE: NOT DETECTED
M PNEUMO IGG SER IA-ACNC: NOT DETECTED
RHINOVIRUS RNA SPEC NAA+PROBE: DETECTED
RSV RNA NPH QL NAA+NON-PROBE: NOT DETECTED
SARS-COV-2 RNA NPH QL NAA+NON-PROBE: NOT DETECTED

## 2024-12-04 PROCEDURE — 0202U NFCT DS 22 TRGT SARS-COV-2: CPT

## 2024-12-09 DIAGNOSIS — R39.11 BENIGN PROSTATIC HYPERPLASIA WITH URINARY HESITANCY: Primary | ICD-10-CM

## 2024-12-09 DIAGNOSIS — N40.1 BENIGN PROSTATIC HYPERPLASIA WITH URINARY HESITANCY: Primary | ICD-10-CM

## 2025-01-08 ENCOUNTER — OFFICE VISIT (OUTPATIENT)
Dept: ORTHOPEDIC SURGERY | Facility: CLINIC | Age: 85
End: 2025-01-08
Payer: MEDICARE

## 2025-01-08 ENCOUNTER — HOSPITAL ENCOUNTER (OUTPATIENT)
Dept: GENERAL RADIOLOGY | Facility: HOSPITAL | Age: 85
Discharge: HOME | End: 2025-01-08
Admitting: ORTHOPAEDIC SURGERY
Payer: MEDICARE

## 2025-01-08 VITALS
WEIGHT: 172.84 LBS | HEIGHT: 70 IN | BODY MASS INDEX: 24.74 KG/M2 | SYSTOLIC BLOOD PRESSURE: 93 MMHG | HEART RATE: 72 BPM | DIASTOLIC BLOOD PRESSURE: 56 MMHG

## 2025-01-08 DIAGNOSIS — M25.562 LEFT KNEE PAIN, UNSPECIFIED CHRONICITY: Primary | ICD-10-CM

## 2025-01-08 DIAGNOSIS — M17.12 OSTEOARTHRITIS OF LEFT PATELLOFEMORAL JOINT: Primary | ICD-10-CM

## 2025-01-08 DIAGNOSIS — M25.562 LEFT KNEE PAIN, UNSPECIFIED CHRONICITY: ICD-10-CM

## 2025-01-08 PROCEDURE — 99213 OFFICE O/P EST LOW 20 MIN: CPT | Performed by: ORTHOPAEDIC SURGERY

## 2025-01-08 PROCEDURE — 73562 X-RAY EXAM OF KNEE 3: CPT

## 2025-01-08 NOTE — PROGRESS NOTES
Follow-up Visit         Patient: Henry Olea MD  YOB: 1940  Date of Encounter: 2025      Chief  Complaint:   Chief Complaint   Patient presents with    Left Knee - Pain         HPI:  Henry Olea MD, 84 y.o. male presents in follow-up no complaints of left knee pain which slowly developed but became much more severe over the past few weeks.  He denies specific injury he localizes pain anterolateral aspect of left knee.  He denies giving way or locking.  Denies weakness or numbness to his left leg, he has undergone lumbar spine fusion and is doing well.  He denies radicular type pain left leg.  Past medical history includes atrial fibrillation and prostate cancer.  Family history is positive for rheumatoid arthritis.        Medical History:  Patient Active Problem List   Diagnosis    Skin lesion    Suture check    Neoplasm of uncertain behavior of skin    Benign prostatic hyperplasia with urinary hesitancy    Prostate cancer     Past Medical History:   Diagnosis Date    Atrial fibrillation     Diverticulitis     Hypertension     Pre-diabetes     Prostate cancer     Skin cancer 2010    Melanoma           Social History:  Social History     Socioeconomic History    Marital status:    Tobacco Use    Smoking status: Former     Current packs/day: 0.00     Types: Cigarettes     Quit date:      Years since quittin.0     Passive exposure: Past    Smokeless tobacco: Never   Vaping Use    Vaping status: Never Used   Substance and Sexual Activity    Alcohol use: Yes     Comment: 3 oz     Drug use: No    Sexual activity: Defer           Current Medications:    Current Outpatient Medications:     amLODIPine (NORVASC) 2.5 MG tablet, Take 2 tablets by mouth Daily., Disp: 180 tablet, Rfl: 3    aspirin 81 MG EC tablet, Take 1 tablet by mouth 2 (Two) Times a Day., Disp: , Rfl:     flecainide (TAMBOCOR) 50 MG tablet, Take ½ tablet by mouth 2 (Two) Times a Day.,  Disp: 30 tablet, Rfl: 5    GLUCOSAMINE-CHONDROITIN PO, Take 1 tablet by mouth Daily., Disp: , Rfl:     melatonin 5 MG tablet tablet, Take 1 tablet by mouth At Night As Needed., Disp: , Rfl:     multivitamin with minerals (MULTIVITAMIN ADULTS 50+ PO), Take 1 tablet by mouth Daily., Disp: , Rfl:     Omega-3 Fatty Acids (fish oil) 1000 MG capsule capsule, Take 1 capsule by mouth Daily With Breakfast., Disp: , Rfl:     pantoprazole (PROTONIX) 40 MG EC tablet, Take 1 tablet by mouth Daily., Disp: 90 tablet, Rfl: 4    tamsulosin (FLOMAX) 0.4 MG capsule 24 hr capsule, Take 1 capsule by mouth Every Night. Begin medication 2 days prior to starting radiation., Disp: 30 capsule, Rfl: 11    tamsulosin (FLOMAX) 0.4 MG capsule 24 hr capsule, Take 1 capsule by mouth Daily., Disp: 30 capsule, Rfl: 0    vitamin C (ASCORBIC ACID) 250 MG tablet, Take 1 tablet by mouth Daily., Disp: , Rfl:     amLODIPine (NORVASC) 10 MG tablet, Take 1 tablet by mouth Daily., Disp: 90 tablet, Rfl: 2    cyclobenzaprine (FLEXERIL) 10 MG tablet, Take 1 tablet by mouth 3 (three) times daily as needed for muscle spasms for up to 10 days., Disp: 30 tablet, Rfl: 0    mometasone (ELOCON) 0.1 % cream, Apply topically once daily., Disp: 45 g, Rfl: 3    Vibegron 75 MG tablet, Take 1 tablet by mouth Daily., Disp: , Rfl:     Vibegron 75 MG tablet, Take 1 tablet by mouth Daily., Disp: 30 tablet, Rfl: 6        Allergies:  No Known Allergies        Family History:  Family History   Problem Relation Age of Onset    Diverticulitis Mother     Rheum arthritis Mother     Diabetes Mother     Lung cancer Father     Heart disease Father     Heart disease Brother            Surgical History:  Past Surgical History:   Procedure Laterality Date    BACK SURGERY  2004 2012    BACK SURGERY  10/2024    COLON RESECTION      COLONOSCOPY      cologuard per PCP    HUMERUS FRACTURE SURGERY      PROSTATE SURGERY      ROTATOR CUFF REPAIR Right 2000    SKIN CANCER EXCISION  2010     "Melanoma resection         Vitals:  Vitals:    01/08/25 1313   BP: 93/56   Pulse: 72   Weight: 78.4 kg (172 lb 13.5 oz)   Height: 177.8 cm (70\")     Body mass index is 24.8 kg/m².        Radiology:   Radiographs left knee weightbearing obtained and reviewed demonstrate minimal arthritis involving medial or lateral compartments with preservation of joint space.  Howard view demonstrates patellofemoral arthritis involving lateral facet of the patella.  There is near complete loss of patellofemoral joint space laterally.          Orthopedic Examination: Left knee reveals no obvious effusion no significant medial or lateral joint line tenderness no instability with varus valgus stressing.  Flexion and extension are full patella with mild crepitance and mild lateral tracking with no gross instability and normal Q angle.          Assessment & Plan:   84 y.o. male presents with left knee pain anterolateral his findings are consistent with patellofemoral osteoarthritis he does not present with significant effusion and has no history of injury.  Unlikely that he would respond to intra-articular steroid injection and given normal appearance of medial lateral joint spaces. I would not recommend, I think he is an ideal candidate for viscosupplementation we will request approval and see him back once approved.           Diagnosis Plan   1. Osteoarthritis of left patellofemoral joint                  Cc:  Jimmy Zhang MD              This document has been electronically signed by Romel Stoner MD   January 10, 2025 07:28 EST  "

## 2025-01-28 ENCOUNTER — HOSPITAL ENCOUNTER (OUTPATIENT)
Dept: GENERAL RADIOLOGY | Facility: HOSPITAL | Age: 85
Discharge: HOME | End: 2025-01-28
Payer: MEDICARE

## 2025-01-28 ENCOUNTER — TRANSCRIBE ORDERS (OUTPATIENT)
Dept: ADMINISTRATIVE | Facility: HOSPITAL | Age: 85
End: 2025-01-28
Payer: MEDICARE

## 2025-01-28 DIAGNOSIS — R07.89 CHEST WALL PAIN: ICD-10-CM

## 2025-01-28 DIAGNOSIS — R07.89 OTHER CHEST PAIN: Primary | ICD-10-CM

## 2025-01-28 DIAGNOSIS — R07.89 CHEST WALL PAIN: Primary | ICD-10-CM

## 2025-01-28 PROCEDURE — 71100 X-RAY EXAM RIBS UNI 2 VIEWS: CPT

## 2025-01-28 PROCEDURE — 71046 X-RAY EXAM CHEST 2 VIEWS: CPT

## 2025-01-28 PROCEDURE — 71100 X-RAY EXAM RIBS UNI 2 VIEWS: CPT | Performed by: RADIOLOGY

## 2025-01-28 PROCEDURE — 71046 X-RAY EXAM CHEST 2 VIEWS: CPT | Performed by: RADIOLOGY

## 2025-01-29 DIAGNOSIS — C61 PROSTATE CANCER: Primary | ICD-10-CM

## 2025-01-31 ENCOUNTER — LAB (OUTPATIENT)
Dept: LAB | Facility: HOSPITAL | Age: 85
End: 2025-01-31
Payer: MEDICARE

## 2025-01-31 PROCEDURE — 84153 ASSAY OF PSA TOTAL: CPT | Performed by: UROLOGY

## 2025-01-31 PROCEDURE — 36415 COLL VENOUS BLD VENIPUNCTURE: CPT | Performed by: UROLOGY

## 2025-02-01 LAB — PSA SERPL-MCNC: 3.01 NG/ML (ref 0–4)

## 2025-02-03 ENCOUNTER — OFFICE VISIT (OUTPATIENT)
Dept: UROLOGY | Facility: CLINIC | Age: 85
End: 2025-02-03
Payer: MEDICARE

## 2025-02-03 VITALS
SYSTOLIC BLOOD PRESSURE: 118 MMHG | HEART RATE: 57 BPM | BODY MASS INDEX: 24.6 KG/M2 | HEIGHT: 70 IN | WEIGHT: 171.8 LBS | DIASTOLIC BLOOD PRESSURE: 73 MMHG

## 2025-02-03 DIAGNOSIS — C61 PROSTATE CANCER: Primary | ICD-10-CM

## 2025-02-03 PROCEDURE — 1159F MED LIST DOCD IN RCRD: CPT | Performed by: UROLOGY

## 2025-02-03 PROCEDURE — 1160F RVW MEDS BY RX/DR IN RCRD: CPT | Performed by: UROLOGY

## 2025-02-03 PROCEDURE — 99213 OFFICE O/P EST LOW 20 MIN: CPT | Performed by: UROLOGY

## 2025-02-03 NOTE — PROGRESS NOTES
Chief Complaint:      Chief Complaint   Patient presents with    Prostate Cancer       HPI:   84 y.o. male that is post CyberKnife he did fantastic he has little bit of frequency maybe twice at night he is on alpha blockade we previously tried Gemtesa I will see him back in 3 months we will monitor his PSA    Past Medical History:     Past Medical History:   Diagnosis Date    Atrial fibrillation 2021    Diverticulitis 2005    Hypertension     Pre-diabetes     Prostate cancer     Skin cancer 2010    Melanoma       Current Meds:     Current Outpatient Medications   Medication Sig Dispense Refill    amLODIPine (NORVASC) 2.5 MG tablet Take 2 tablets by mouth Daily. 180 tablet 3    aspirin 81 MG EC tablet Take 1 tablet by mouth 2 (Two) Times a Day.      flecainide (TAMBOCOR) 50 MG tablet Take ½ tablet by mouth 2 (Two) Times a Day. 30 tablet 5    GLUCOSAMINE-CHONDROITIN PO Take 1 tablet by mouth Daily.      melatonin 5 MG tablet tablet Take 1 tablet by mouth At Night As Needed.      multivitamin with minerals (MULTIVITAMIN ADULTS 50+ PO) Take 1 tablet by mouth Daily.      Omega-3 Fatty Acids (fish oil) 1000 MG capsule capsule Take 1 capsule by mouth Daily With Breakfast.      pantoprazole (PROTONIX) 40 MG EC tablet Take 1 tablet by mouth Daily. 90 tablet 4    tamsulosin (FLOMAX) 0.4 MG capsule 24 hr capsule Take 1 capsule by mouth Every Night. Begin medication 2 days prior to starting radiation. 30 capsule 11    tamsulosin (FLOMAX) 0.4 MG capsule 24 hr capsule Take 1 capsule by mouth Daily. 30 capsule 0    vitamin C (ASCORBIC ACID) 250 MG tablet Take 1 tablet by mouth Daily.      amLODIPine (NORVASC) 10 MG tablet Take 1 tablet by mouth Daily. 90 tablet 2    cyclobenzaprine (FLEXERIL) 10 MG tablet Take 1 tablet by mouth 3 (three) times daily as needed for muscle spasms for up to 10 days. 30 tablet 0    mometasone (ELOCON) 0.1 % cream Apply topically once daily. 45 g 3    Vibegron 75 MG tablet Take 1 tablet by mouth Daily.       Vibegron 75 MG tablet Take 1 tablet by mouth Daily. 30 tablet 6     No current facility-administered medications for this visit.        Allergies:      No Known Allergies     Past Surgical History:     Past Surgical History:   Procedure Laterality Date    BACK SURGERY  2004    BACK SURGERY  10/2024    COLON RESECTION      COLONOSCOPY      cologuard per PCP    HUMERUS FRACTURE SURGERY      PROSTATE SURGERY      ROTATOR CUFF REPAIR Right 2000    SKIN CANCER EXCISION  2010    Melanoma resection       Social History:     Social History     Socioeconomic History    Marital status:    Tobacco Use    Smoking status: Former     Current packs/day: 0.00     Types: Cigarettes     Quit date:      Years since quittin.1     Passive exposure: Past    Smokeless tobacco: Never   Vaping Use    Vaping status: Never Used   Substance and Sexual Activity    Alcohol use: Yes     Comment: 3 oz     Drug use: No    Sexual activity: Defer       Family History:     Family History   Problem Relation Age of Onset    Diverticulitis Mother     Rheum arthritis Mother     Diabetes Mother     Lung cancer Father     Heart disease Father     Heart disease Brother        Review of Systems:     Review of Systems   Constitutional: Negative.    HENT: Negative.     Eyes: Negative.    Respiratory: Negative.     Cardiovascular: Negative.    Gastrointestinal: Negative.    Endocrine: Negative.    Musculoskeletal: Negative.    Allergic/Immunologic: Negative.    Neurological: Negative.    Hematological: Negative.    Psychiatric/Behavioral: Negative.         Physical Exam:     Physical Exam  Vitals and nursing note reviewed.   Constitutional:       Appearance: He is well-developed.   HENT:      Head: Normocephalic and atraumatic.   Eyes:      Conjunctiva/sclera: Conjunctivae normal.      Pupils: Pupils are equal, round, and reactive to light.   Cardiovascular:      Rate and Rhythm: Normal rate and regular rhythm.      Heart sounds:  Normal heart sounds.   Pulmonary:      Effort: Pulmonary effort is normal.      Breath sounds: Normal breath sounds.   Abdominal:      General: Bowel sounds are normal.      Palpations: Abdomen is soft.   Musculoskeletal:         General: Normal range of motion.      Cervical back: Normal range of motion.   Skin:     General: Skin is warm and dry.   Neurological:      Mental Status: He is alert and oriented to person, place, and time.      Deep Tendon Reflexes: Reflexes are normal and symmetric.   Psychiatric:         Behavior: Behavior normal.         Thought Content: Thought content normal.         Judgment: Judgment normal.         I have reviewed the following portions of the patient's history: Allergies, current medications, past family history, past medical history, past social history, past surgical history, problem list, and ROS and confirm it is accurate.    Recent Image (CT and/or KUB):      CT Abdomen and Pelvis: No results found for this or any previous visit.       CT Stone Protocol: Results for orders placed during the hospital encounter of 06/28/24    CT Abdomen Pelvis Stone Protocol    Narrative  EXAM:  CT Abdomen and Pelvis Without Intravenous Contrast    EXAM DATE:  6/28/2024 11:42 AM    CLINICAL HISTORY:  prostate cancer; F42-[id number] neoplasm of prostate    TECHNIQUE:  Axial computed tomography images of the abdomen and pelvis without  intravenous contrast.  Sagittal and coronal reformatted images were  created and reviewed.  This CT exam was performed using one or more of  the following dose reduction techniques:  automated exposure control,  adjustment of the mA and/or kV according to patient size, and/or use of  iterative reconstruction technique.    COMPARISON:  7/10/2015    FINDINGS:  LUNG BASES:  Unremarkable as visualized.  No consolidation.  No  suspicious pulmonary nodules.    ABDOMEN:  LIVER:  Unremarkable as visualized.  GALLBLADDER AND BILE DUCTS:  Unremarkable as visualized.  No  calcified  stones.  No ductal dilation.  PANCREAS:  Unremarkable as visualized.  No ductal dilation.  SPLEEN:  Unremarkable as visualized.  No splenomegaly.  ADRENALS:  Unremarkable as visualized.  No mass.  KIDNEYS AND URETERS:  Unremarkable as visualized.  No obstructing  stones.  No hydronephrosis.  STOMACH AND BOWEL:  Unremarkable as visualized.  No obstruction.  No  mucosal thickening.    PELVIS:  APPENDIX:  No findings to suggest acute appendicitis.  BLADDER:  Unremarkable as visualized.  No stones.  REPRODUCTIVE:  Unremarkable as visualized.    ABDOMEN and PELVIS:  INTRAPERITONEAL SPACE:  Unremarkable as visualized.  No free air.  No  significant fluid collection.  BONES/JOINTS:  Degenerative disc disease throughout the lumbar spine.  Degenerative facet arthropathy throughout the lumbar spine, most  prominent in the lower lumbar spine.  Scoliotic curvature.  Grade 3  anterolisthesis L4 on 5 vertebral body.  No acute fracture.  No  dislocation.  No sclerotic bone lesions are identified.  SOFT TISSUES:  Unremarkable as visualized.  VASCULATURE:  Atherosclerotic disease.  No abdominal aortic aneurysm.  LYMPH NODES:  The prostate appears enlarged, however no seminal  vesicle enlargement or evidence of lymphadenopathy within the pelvis.    Impression  1.  No sclerotic bone lesions are identified.  2.  No suspicious pulmonary nodules.  3.  The prostate appears enlarged, however no seminal vesicle  enlargement or evidence of lymphadenopathy within the pelvis.  4.  Grade 3 anterolisthesis L4 on 5 vertebral body.  5.  Degenerative changes lumbar spine as described.      This report was finalized on 6/28/2024 12:08 PM by Dr. Dirk Dillon MD.       KUB: No results found for this or any previous visit.       Labs (past 3 months):      Orders Only on 01/29/2025   Component Date Value Ref Range Status    PSA 01/31/2025 3.010  0.000 - 4.000 ng/mL Final   Lab on 12/04/2024   Component Date Value Ref Range Status     ADENOVIRUS, PCR 12/04/2024 Not Detected  Not Detected Final    Coronavirus 229E 12/04/2024 Not Detected  Not Detected Final    Coronavirus HKU1 12/04/2024 Not Detected  Not Detected Final    Coronavirus NL63 12/04/2024 Not Detected  Not Detected Final    Coronavirus OC43 12/04/2024 Not Detected  Not Detected Final    COVID19 12/04/2024 Not Detected  Not Detected - Ref. Range Final    Human Metapneumovirus 12/04/2024 Not Detected  Not Detected Final    Human Rhinovirus/Enterovirus 12/04/2024 Detected (A)  Not Detected Final    Influenza A PCR 12/04/2024 Not Detected  Not Detected Final    Influenza B PCR 12/04/2024 Not Detected  Not Detected Final    Parainfluenza Virus 1 12/04/2024 Not Detected  Not Detected Final    Parainfluenza Virus 2 12/04/2024 Not Detected  Not Detected Final    Parainfluenza Virus 3 12/04/2024 Not Detected  Not Detected Final    Parainfluenza Virus 4 12/04/2024 Not Detected  Not Detected Final    RSV, PCR 12/04/2024 Not Detected  Not Detected Final    Bordetella pertussis pcr 12/04/2024 Not Detected  Not Detected Final    Bordetella parapertussis PCR 12/04/2024 Not Detected  Not Detected Final    Chlamydophila pneumoniae PCR 12/04/2024 Not Detected  Not Detected Final    Mycoplasma pneumo by PCR 12/04/2024 Not Detected  Not Detected Final        Procedure:       Assessment/Plan:   Prostate cancer:  He returns today status post biopsy for extensive discussion of prostate cancer.  We discussed staging and grading of the disease.  I described with a Patricia system being from a 2 to10 scale with the most common low-grade pattern being a Patricia 6.  I discussed the staging workup including a total body bone scan and CT scan especially with a PSA greater than 10.  We discussed the various options at length. I discussed a radical retropubic prostatectomy done in the traditional fashion and using the robotic technique.  I then discussed radiation treatment both seed therapy and external beam therapy  using Gold fiduciary markers.  We talked about some of the other alternatives such as a cryosurgical ablation at high intensity focused ultrasound as being viable alternatives but not recommended at this time.  He focused on aggressive watchful waiting and explained that currently low literature it's been discovered that a lot of men can actually observe it especially with numerous other comorbidities cannot have problems from the cancer by itself.  Talked about hormonal ablation and the side effects of creation of insulin resistance.  Overall, the patient was given appropriate literature, is going to think about it, and I'm going to revisit the topic with them after the next office visit.  He has an excellent response to the CyberKnife thus far          This document has been electronically signed by CHIKA MINOR MD February 3, 2025 15:37 EST    Dictated Utilizing Dragon Dictation: Part of this note may be an electronic transcription/translation of spoken language to printed text using the Dragon Dictation System.

## 2025-02-06 ENCOUNTER — TELEPHONE (OUTPATIENT)
Dept: UROLOGY | Facility: CLINIC | Age: 85
End: 2025-02-06
Payer: MEDICARE

## 2025-02-06 NOTE — TELEPHONE ENCOUNTER
I called Dr. Olea with his PSA level which was good.    ----- Message from Twin Barnett sent at 2/6/2025  3:04 PM EST -----  Notify great  ----- Message -----  From: Lab, Background User  Sent: 2/1/2025   2:16 AM EST  To: Twin Barnett MD

## 2025-03-10 RX ORDER — FLECAINIDE ACETATE 50 MG/1
25 TABLET ORAL 2 TIMES DAILY
Qty: 30 TABLET | Refills: 5 | Status: CANCELLED | OUTPATIENT
Start: 2025-03-10

## 2025-03-17 ENCOUNTER — LAB (OUTPATIENT)
Dept: LAB | Facility: HOSPITAL | Age: 85
End: 2025-03-17
Payer: MEDICARE

## 2025-03-17 ENCOUNTER — HOSPITAL ENCOUNTER (OUTPATIENT)
Facility: HOSPITAL | Age: 85
Setting detail: RADIATION/ONCOLOGY SERIES
End: 2025-03-17
Payer: MEDICARE

## 2025-03-17 DIAGNOSIS — C61 PROSTATE CANCER: ICD-10-CM

## 2025-03-17 DIAGNOSIS — C61 PROSTATE CANCER: Primary | ICD-10-CM

## 2025-03-17 LAB — PSA SERPL-MCNC: 2.79 NG/ML (ref 0–4)

## 2025-03-17 PROCEDURE — 36415 COLL VENOUS BLD VENIPUNCTURE: CPT | Performed by: UROLOGY

## 2025-03-17 PROCEDURE — 84153 ASSAY OF PSA TOTAL: CPT

## 2025-03-20 ENCOUNTER — OFFICE VISIT (OUTPATIENT)
Age: 85
End: 2025-03-20
Payer: MEDICARE

## 2025-03-20 VITALS
SYSTOLIC BLOOD PRESSURE: 161 MMHG | BODY MASS INDEX: 24.34 KG/M2 | HEART RATE: 51 BPM | HEIGHT: 70 IN | WEIGHT: 170 LBS | TEMPERATURE: 97.4 F | RESPIRATION RATE: 14 BRPM | OXYGEN SATURATION: 97 % | DIASTOLIC BLOOD PRESSURE: 77 MMHG

## 2025-03-20 DIAGNOSIS — C61 PROSTATE CANCER: Primary | ICD-10-CM

## 2025-03-20 PROCEDURE — G0463 HOSPITAL OUTPT CLINIC VISIT: HCPCS

## 2025-03-20 NOTE — PROGRESS NOTES
FOLLOW UP NOTE    PATIENT:                                                      Henry Olea MD  MEDICAL RECORD #:                        [id number]  :                                                          1940  COMPLETION DATE:   2024  DIAGNOSIS:     Prostate cancer  - Stage IIC (cT1c, cN0, cM0, PSA: 9.6, Grade Group: 4)      BRIEF HISTORY:    Routine follow-up visit.  He has a history of clinical localized, higher grade prostate cancer treated with CyberKnife stereotactic body radiotherapy.  He tolerated treatment well.  Acute posttreatment symptoms have improved.  He still experiences some mild urinary outflow obstructive and irritative symptoms for which she continues to use Flomax daily.  He has stable nocturia x 2.  No dysuria.  Minimal urge incontinence.  Bowels are soft but he has some more frequent bowel movements associated with voiding and occasional passage of mucus.  No hematochezia.  No tenesmus.  Energy are good.  He still walks 6 to 7 miles daily.  [place] and plans to go to [facility] to skiing later this month.  He continues to work.  PSA values have trended downward: 3.29 ng/mL on 10/31/2024, 3.01 ng/ml 1930 1025, and 2.79 ng/ml 3/17/2025.    MEDICATIONS: Medication reconciliation for the patient was reviewed and confirmed in the electronic medical record.    Review of Systems   Constitutional:  Negative for appetite change, chills, fatigue, fever and unexpected weight change.   HENT:   Positive for hearing loss.    Eyes: Negative.    Respiratory:  Negative for cough and shortness of breath.    Cardiovascular:  Negative for chest pain and leg swelling.   Gastrointestinal:  Negative for diarrhea, nausea and vomiting.   Genitourinary:  Positive for nocturia. Negative for dysuria.         Bowel urgency with urination at times., urgency     Musculoskeletal:  Positive for arthralgias (knees). Negative for back pain.   Skin: Negative.   "  Neurological:  Negative for dizziness, headaches and light-headedness.   Hematological:  Does not bruise/bleed easily.   Psychiatric/Behavioral:  Negative for depression and sleep disturbance.        Karnofsky score: 90     Physical Exam  Vitals and nursing note reviewed.   Constitutional:       Appearance: He is well-developed.   HENT:      Head: Normocephalic and atraumatic.   Cardiovascular:      Rate and Rhythm: Normal rate and regular rhythm.      Heart sounds: Normal heart sounds. No murmur heard.  Pulmonary:      Effort: Pulmonary effort is normal.      Breath sounds: Normal breath sounds. No wheezing or rales.   Abdominal:      General: Bowel sounds are normal. There is no distension.      Palpations: Abdomen is soft.      Tenderness: There is no abdominal tenderness.   Genitourinary:     Prostate: Enlarged (Soft, 30 to 40 cc estimated size, no nodules or induration.  Seminal vesicles are nonpalpable.). Not tender and no nodules present.      Rectum: External hemorrhoid present. No mass, tenderness, anal fissure or internal hemorrhoid. Normal anal tone.   Musculoskeletal:         General: No tenderness. Normal range of motion.      Cervical back: Normal range of motion and neck supple.   Lymphadenopathy:      Cervical: No cervical adenopathy.      Upper Body:      Right upper body: No supraclavicular adenopathy.      Left upper body: No supraclavicular adenopathy.   Skin:     General: Skin is warm and dry.   Neurological:      Mental Status: He is alert and oriented to person, place, and time.      Sensory: No sensory deficit.   Psychiatric:         Behavior: Behavior normal.         Thought Content: Thought content normal.         Judgment: Judgment normal.         VITAL SIGNS:   Vitals:    03/20/25 0953   BP: 161/77   Pulse: 51   Resp: 14   Temp: 97.4 °F (36.3 °C)   TempSrc: Oral   SpO2: 97%   Weight: 77.1 kg (170 lb)   Height: 177.8 cm (70\")   PainSc: 0-No pain       IPSS Questionnaire (AUA-7):  Over " the past month…    1)  Incomplete Emptying  How often have you had a sensation of not emptying your bladder?  1 - Less than 1 time in 5   2)  Frequency  How often have you had to urinate less than every two hours? 3 - About half the time   3)  Intermittency  How often have you found you stopped and started again several times when you urinated?  2 - Less than half the time   4) Urgency  How often have you found it difficult to postpone urination?  4 - More than half the time   5) Weak Stream  How often have you had a weak urinary stream?  2 - Less than half the time   6) Straining  How often have you had to push or strain to begin urination?  0   7) Nocturia  How many times did you typically get up at night to urinate?  3   Total Score:  14         Quality of life due to urinary symptoms:  If you were to spend the rest of your life with your urinary condition the way it is now, how would you feel about that? 2-Mostly Satisfied   Urine Leakage (Incontinence) 2-Mild (More than a few drops a day, 1-2 pads/day)     Sexual Health Inventory  Current Status    1)  How do you rate your confidence that you could achieve and keep an erection? 1-Very Low   2) When you had erections with sexual stimulation, how often were your erections hard enough for penetration (entering your partner)? 0-No sexual activity   3)  During sexual intercourse, how often were you able to maintain your erection after you had penetrated (entered) into your partner? 0-Did not attempt intercourse   4) During sexual intercourse, how difficult was it to maintain your erection to completion of intercourse? 0-Did not attempt intercourse   5) When you attempted sexual intercourse, how often was it satisfactory to you? 0-No sexual activity   Total Score: 0       Bowel Health Inventory  Current Status: 0-No problems, no rectal bleeding, no discharge, less then 5 bowel movements a day           Karnofsky score: 90         The following portions of the  patient's history were reviewed and updated as appropriate: allergies, current medications, past family history, past medical history, past social history, past surgical history and problem list.         Diagnoses and all orders for this visit:    1. Prostate cancer (Primary)         IMPRESSION:   Loma's 4+4 = 8, clinical stage IIC (T1c, M0, M0), PSA 9.6 ng/ml.   He is 1/2 years status post CyberKnife stereotactic radiotherapy.  He tolerated treatment very well.  He has had a very appropriate early clinical response and biochemical response with PSA decreasing into the mid normal range.  Prognosis remains very good.    RECOMMENDATIONS: He will continue urology surveillance and PSA testing under the care of Dr. Barnett.  He will try to taper/discontinue alpha-blocker, if tolerated.  Annual follow-up with me until he reaches tom PSA at target and symptoms fully resolved.    I spent a total of 25 minutes on todays visit, with more than 20 minutes in direct face to face communication, and the remainder of the time spent in reviewing the relevant history, records, available imaging, and for documentation.    Return in about 1 year (around 3/20/2026).    Jeff Harvey MD

## 2025-03-21 RX ORDER — FLECAINIDE ACETATE 50 MG/1
25 TABLET ORAL 2 TIMES DAILY
Qty: 30 TABLET | Refills: 6 | Status: SHIPPED | OUTPATIENT
Start: 2025-03-21

## 2025-03-21 RX ORDER — FLECAINIDE ACETATE 50 MG/1
25 TABLET ORAL 2 TIMES DAILY
Qty: 30 TABLET | Refills: 5 | Status: CANCELLED | OUTPATIENT
Start: 2025-03-21

## 2025-04-09 ENCOUNTER — TRANSCRIBE ORDERS (OUTPATIENT)
Dept: ADMINISTRATIVE | Facility: HOSPITAL | Age: 85
End: 2025-04-09
Payer: MEDICARE

## 2025-04-09 ENCOUNTER — LAB (OUTPATIENT)
Dept: LAB | Facility: HOSPITAL | Age: 85
End: 2025-04-09
Payer: MEDICARE

## 2025-04-09 DIAGNOSIS — M25.50 PAIN IN JOINT, MULTIPLE SITES: ICD-10-CM

## 2025-04-09 DIAGNOSIS — Z13.21 ENCOUNTER FOR VITAMIN DEFICIENCY SCREENING: ICD-10-CM

## 2025-04-09 DIAGNOSIS — R53.82 CHRONIC FATIGUE, UNSPECIFIED: ICD-10-CM

## 2025-04-09 DIAGNOSIS — E78.2 MIXED HYPERLIPIDEMIA: ICD-10-CM

## 2025-04-09 DIAGNOSIS — I10 PRIMARY HYPERTENSION: ICD-10-CM

## 2025-04-09 DIAGNOSIS — I10 PRIMARY HYPERTENSION: Primary | ICD-10-CM

## 2025-04-09 DIAGNOSIS — R73.09 ELEVATED GLUCOSE LEVEL: ICD-10-CM

## 2025-04-09 LAB
25(OH)D3 SERPL-MCNC: 46.9 NG/ML (ref 30–100)
ALBUMIN SERPL-MCNC: 4.2 G/DL (ref 3.5–5.2)
ALBUMIN/GLOB SERPL: 1.4 G/DL
ALP SERPL-CCNC: 82 U/L (ref 39–117)
ALT SERPL W P-5'-P-CCNC: 13 U/L (ref 1–41)
ANION GAP SERPL CALCULATED.3IONS-SCNC: 9.6 MMOL/L (ref 5–15)
AST SERPL-CCNC: 17 U/L (ref 1–40)
BASOPHILS # BLD AUTO: 0.07 10*3/MM3 (ref 0–0.2)
BASOPHILS NFR BLD AUTO: 1.2 % (ref 0–1.5)
BILIRUB SERPL-MCNC: 0.5 MG/DL (ref 0–1.2)
BUN SERPL-MCNC: 18 MG/DL (ref 8–23)
BUN/CREAT SERPL: 20.5 (ref 7–25)
CALCIUM SPEC-SCNC: 9.8 MG/DL (ref 8.6–10.5)
CHLORIDE SERPL-SCNC: 104 MMOL/L (ref 98–107)
CHOLEST SERPL-MCNC: 169 MG/DL (ref 0–200)
CO2 SERPL-SCNC: 24.4 MMOL/L (ref 22–29)
CREAT SERPL-MCNC: 0.88 MG/DL (ref 0.76–1.27)
DEPRECATED RDW RBC AUTO: 45.7 FL (ref 37–54)
EGFRCR SERPLBLD CKD-EPI 2021: 84.8 ML/MIN/1.73
EOSINOPHIL # BLD AUTO: 0.38 10*3/MM3 (ref 0–0.4)
EOSINOPHIL NFR BLD AUTO: 6.3 % (ref 0.3–6.2)
ERYTHROCYTE [DISTWIDTH] IN BLOOD BY AUTOMATED COUNT: 13.1 % (ref 12.3–15.4)
ERYTHROCYTE [SEDIMENTATION RATE] IN BLOOD: 16 MM/HR (ref 0–20)
FOLATE SERPL-MCNC: >20 NG/ML (ref 4.78–24.2)
GLOBULIN UR ELPH-MCNC: 3 GM/DL
GLUCOSE SERPL-MCNC: 99 MG/DL (ref 65–99)
HBA1C MFR BLD: 6.4 % (ref 4.8–5.6)
HCT VFR BLD AUTO: 42.5 % (ref 37.5–51)
HDLC SERPL-MCNC: 65 MG/DL (ref 40–60)
HGB BLD-MCNC: 13.6 G/DL (ref 13–17.7)
IMM GRANULOCYTES # BLD AUTO: 0.01 10*3/MM3 (ref 0–0.05)
IMM GRANULOCYTES NFR BLD AUTO: 0.2 % (ref 0–0.5)
LDLC SERPL CALC-MCNC: 95 MG/DL (ref 0–100)
LDLC/HDLC SERPL: 1.47 {RATIO}
LYMPHOCYTES # BLD AUTO: 1.87 10*3/MM3 (ref 0.7–3.1)
LYMPHOCYTES NFR BLD AUTO: 31 % (ref 19.6–45.3)
MCH RBC QN AUTO: 30.4 PG (ref 26.6–33)
MCHC RBC AUTO-ENTMCNC: 32 G/DL (ref 31.5–35.7)
MCV RBC AUTO: 95.1 FL (ref 79–97)
MONOCYTES # BLD AUTO: 0.58 10*3/MM3 (ref 0.1–0.9)
MONOCYTES NFR BLD AUTO: 9.6 % (ref 5–12)
NEUTROPHILS NFR BLD AUTO: 3.13 10*3/MM3 (ref 1.7–7)
NEUTROPHILS NFR BLD AUTO: 51.7 % (ref 42.7–76)
NRBC BLD AUTO-RTO: 0 /100 WBC (ref 0–0.2)
PLATELET # BLD AUTO: 314 10*3/MM3 (ref 140–450)
PMV BLD AUTO: 9.4 FL (ref 6–12)
POTASSIUM SERPL-SCNC: 4.9 MMOL/L (ref 3.5–5.2)
PROT SERPL-MCNC: 7.2 G/DL (ref 6–8.5)
RBC # BLD AUTO: 4.47 10*6/MM3 (ref 4.14–5.8)
SODIUM SERPL-SCNC: 138 MMOL/L (ref 136–145)
T3 SERPL-MCNC: 87.8 NG/DL (ref 80–200)
T4 SERPL-MCNC: 6.38 MCG/DL (ref 4.5–11.7)
TRIGL SERPL-MCNC: 41 MG/DL (ref 0–150)
TSH SERPL DL<=0.05 MIU/L-ACNC: 3.11 UIU/ML (ref 0.27–4.2)
VIT B12 BLD-MCNC: 635 PG/ML (ref 211–946)
VLDLC SERPL-MCNC: 9 MG/DL (ref 5–40)
WBC NRBC COR # BLD AUTO: 6.04 10*3/MM3 (ref 3.4–10.8)

## 2025-04-09 PROCEDURE — 85652 RBC SED RATE AUTOMATED: CPT

## 2025-04-09 PROCEDURE — 82607 VITAMIN B-12: CPT

## 2025-04-09 PROCEDURE — 83036 HEMOGLOBIN GLYCOSYLATED A1C: CPT

## 2025-04-09 PROCEDURE — 80061 LIPID PANEL: CPT

## 2025-04-09 PROCEDURE — 80053 COMPREHEN METABOLIC PANEL: CPT

## 2025-04-09 PROCEDURE — 82746 ASSAY OF FOLIC ACID SERUM: CPT

## 2025-04-09 PROCEDURE — 82306 VITAMIN D 25 HYDROXY: CPT

## 2025-04-09 PROCEDURE — 36415 COLL VENOUS BLD VENIPUNCTURE: CPT

## 2025-04-09 PROCEDURE — 85025 COMPLETE CBC W/AUTO DIFF WBC: CPT

## 2025-04-09 PROCEDURE — 84436 ASSAY OF TOTAL THYROXINE: CPT

## 2025-04-09 PROCEDURE — 84480 ASSAY TRIIODOTHYRONINE (T3): CPT

## 2025-04-09 PROCEDURE — 84443 ASSAY THYROID STIM HORMONE: CPT

## 2025-04-14 ENCOUNTER — OFFICE VISIT (OUTPATIENT)
Dept: ORTHOPEDIC SURGERY | Facility: CLINIC | Age: 85
End: 2025-04-14
Payer: MEDICARE

## 2025-04-14 VITALS — WEIGHT: 169.75 LBS | HEIGHT: 70 IN | BODY MASS INDEX: 24.3 KG/M2

## 2025-04-14 DIAGNOSIS — M17.12 OSTEOARTHRITIS OF LEFT PATELLOFEMORAL JOINT: Primary | ICD-10-CM

## 2025-04-14 RX ADMIN — LIDOCAINE HYDROCHLORIDE 5 ML: 10 INJECTION, SOLUTION EPIDURAL; INFILTRATION; INTRACAUDAL; PERINEURAL at 12:50

## 2025-04-14 RX ADMIN — METHYLPREDNISOLONE ACETATE 40 MG: 40 INJECTION, SUSPENSION INTRA-ARTICULAR; INTRALESIONAL; INTRAMUSCULAR; SOFT TISSUE at 12:50

## 2025-04-23 RX ORDER — LIDOCAINE HYDROCHLORIDE 10 MG/ML
5 INJECTION, SOLUTION EPIDURAL; INFILTRATION; INTRACAUDAL; PERINEURAL
Status: COMPLETED | OUTPATIENT
Start: 2025-04-14 | End: 2025-04-14

## 2025-04-23 RX ORDER — METHYLPREDNISOLONE ACETATE 40 MG/ML
40 INJECTION, SUSPENSION INTRA-ARTICULAR; INTRALESIONAL; INTRAMUSCULAR; SOFT TISSUE
Status: COMPLETED | OUTPATIENT
Start: 2025-04-14 | End: 2025-04-14

## 2025-05-19 ENCOUNTER — OFFICE VISIT (OUTPATIENT)
Dept: CARDIOLOGY | Facility: CLINIC | Age: 85
End: 2025-05-19
Payer: MEDICARE

## 2025-05-19 ENCOUNTER — LAB (OUTPATIENT)
Dept: LAB | Facility: HOSPITAL | Age: 85
End: 2025-05-19
Payer: MEDICARE

## 2025-05-19 ENCOUNTER — HOSPITAL ENCOUNTER (OUTPATIENT)
Dept: GENERAL RADIOLOGY | Facility: HOSPITAL | Age: 85
Discharge: HOME | End: 2025-05-19
Payer: MEDICARE

## 2025-05-19 VITALS
BODY MASS INDEX: 25.86 KG/M2 | SYSTOLIC BLOOD PRESSURE: 134 MMHG | HEART RATE: 56 BPM | OXYGEN SATURATION: 96 % | WEIGHT: 174.6 LBS | DIASTOLIC BLOOD PRESSURE: 65 MMHG | HEIGHT: 69 IN | RESPIRATION RATE: 16 BRPM

## 2025-05-19 DIAGNOSIS — R05.8 NONPRODUCTIVE COUGH: Primary | ICD-10-CM

## 2025-05-19 DIAGNOSIS — I10 ESSENTIAL HYPERTENSION: ICD-10-CM

## 2025-05-19 DIAGNOSIS — I48.0 PAROXYSMAL ATRIAL FIBRILLATION: Primary | ICD-10-CM

## 2025-05-19 DIAGNOSIS — R05.8 NONPRODUCTIVE COUGH: ICD-10-CM

## 2025-05-19 DIAGNOSIS — J84.10 PULMONARY FIBROSIS, UNSPECIFIED: ICD-10-CM

## 2025-05-19 LAB
B PARAPERT DNA SPEC QL NAA+PROBE: NOT DETECTED
B PERT DNA SPEC QL NAA+PROBE: NOT DETECTED
BASOPHILS # BLD AUTO: 0.06 10*3/MM3 (ref 0–0.2)
BASOPHILS NFR BLD AUTO: 0.5 % (ref 0–1.5)
C PNEUM DNA NPH QL NAA+NON-PROBE: NOT DETECTED
CRP SERPL-MCNC: 2.57 MG/DL (ref 0–0.5)
DEPRECATED RDW RBC AUTO: 47.1 FL (ref 37–54)
EOSINOPHIL # BLD AUTO: 0.16 10*3/MM3 (ref 0–0.4)
EOSINOPHIL NFR BLD AUTO: 1.3 % (ref 0.3–6.2)
ERYTHROCYTE [DISTWIDTH] IN BLOOD BY AUTOMATED COUNT: 13.3 % (ref 12.3–15.4)
FLUAV SUBTYP SPEC NAA+PROBE: NOT DETECTED
FLUBV RNA ISLT QL NAA+PROBE: NOT DETECTED
HADV DNA SPEC NAA+PROBE: NOT DETECTED
HCOV 229E RNA SPEC QL NAA+PROBE: NOT DETECTED
HCOV HKU1 RNA SPEC QL NAA+PROBE: NOT DETECTED
HCOV NL63 RNA SPEC QL NAA+PROBE: NOT DETECTED
HCOV OC43 RNA SPEC QL NAA+PROBE: NOT DETECTED
HCT VFR BLD AUTO: 39 % (ref 37.5–51)
HGB BLD-MCNC: 12.4 G/DL (ref 13–17.7)
HMPV RNA NPH QL NAA+NON-PROBE: NOT DETECTED
HPIV1 RNA ISLT QL NAA+PROBE: NOT DETECTED
HPIV2 RNA SPEC QL NAA+PROBE: NOT DETECTED
HPIV3 RNA NPH QL NAA+PROBE: NOT DETECTED
HPIV4 P GENE NPH QL NAA+PROBE: NOT DETECTED
IMM GRANULOCYTES # BLD AUTO: 0.04 10*3/MM3 (ref 0–0.05)
IMM GRANULOCYTES NFR BLD AUTO: 0.3 % (ref 0–0.5)
LYMPHOCYTES # BLD AUTO: 1.7 10*3/MM3 (ref 0.7–3.1)
LYMPHOCYTES NFR BLD AUTO: 14.3 % (ref 19.6–45.3)
M PNEUMO IGG SER IA-ACNC: NOT DETECTED
MCH RBC QN AUTO: 30.4 PG (ref 26.6–33)
MCHC RBC AUTO-ENTMCNC: 31.8 G/DL (ref 31.5–35.7)
MCV RBC AUTO: 95.6 FL (ref 79–97)
MONOCYTES # BLD AUTO: 0.71 10*3/MM3 (ref 0.1–0.9)
MONOCYTES NFR BLD AUTO: 6 % (ref 5–12)
NEUTROPHILS NFR BLD AUTO: 77.6 % (ref 42.7–76)
NEUTROPHILS NFR BLD AUTO: 9.21 10*3/MM3 (ref 1.7–7)
NRBC BLD AUTO-RTO: 0 /100 WBC (ref 0–0.2)
PLATELET # BLD AUTO: 283 10*3/MM3 (ref 140–450)
PMV BLD AUTO: 8.8 FL (ref 6–12)
RBC # BLD AUTO: 4.08 10*6/MM3 (ref 4.14–5.8)
RHINOVIRUS RNA SPEC NAA+PROBE: NOT DETECTED
RSV RNA NPH QL NAA+NON-PROBE: NOT DETECTED
SARS-COV-2 RNA RESP QL NAA+PROBE: NOT DETECTED
WBC NRBC COR # BLD AUTO: 11.88 10*3/MM3 (ref 3.4–10.8)

## 2025-05-19 PROCEDURE — 86140 C-REACTIVE PROTEIN: CPT

## 2025-05-19 PROCEDURE — 99214 OFFICE O/P EST MOD 30 MIN: CPT | Performed by: INTERNAL MEDICINE

## 2025-05-19 PROCEDURE — 0202U NFCT DS 22 TRGT SARS-COV-2: CPT

## 2025-05-19 PROCEDURE — 85025 COMPLETE CBC W/AUTO DIFF WBC: CPT

## 2025-05-19 PROCEDURE — 81001 URINALYSIS AUTO W/SCOPE: CPT

## 2025-05-19 PROCEDURE — 36415 COLL VENOUS BLD VENIPUNCTURE: CPT

## 2025-05-19 NOTE — LETTER
May 20, 2025     Jimmy Zhang MD  [address] [place] [address]    Patient: Henry Olea MD   YOB: 1940   Date of Visit: 5/19/2025     Dear Jimmy Zhang MD:       Thank you for referring Henry Olea to me for evaluation. Below are the relevant portions of my assessment and plan of care.    If you have questions, please do not hesitate to call me. I look forward to following Henry along with you.         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  In follow-up for paroxysmal atrial fibrillation.    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  He refused to take anticoagulation due to concern for bleeding.  He has been complaining of bodyaches and chills with nonproductive cough over the last 24 hours.  His temperature in the office today was 98.4 degrees F    No Known Allergies:    Current Outpatient Medications:   •  amLODIPine (NORVASC) 2.5 MG tablet, Take 2 tablets by mouth Daily. (Patient taking differently: Take 1 tablet by mouth Daily.), Disp: 180 tablet, Rfl: 3  •  aspirin 81 MG EC tablet, Take 1 tablet by mouth 2 (Two) Times a Day., Disp: , Rfl:   •  flecainide (TAMBOCOR) 50 MG tablet, Take 1/2 tablet by mouth 2 (Two) Times a Day., Disp: 30 tablet, Rfl: 6  •  GLUCOSAMINE-CHONDROITIN PO, Take 1 tablet by  mouth Daily., Disp: , Rfl:   •  mometasone (ELOCON) 0.1 % cream, Apply topically once daily, Disp: 45 g, Rfl: 3  •  Omega-3 Fatty Acids (fish oil) 1000 MG capsule capsule, Take 1 capsule by mouth Daily With Breakfast., Disp: , Rfl:   •  pantoprazole (PROTONIX) 40 MG EC tablet, Take 1 tablet by mouth Daily., Disp: 90 tablet, Rfl: 4  •  tamsulosin (FLOMAX) 0.4 MG capsule 24 hr capsule, Take 1 capsule by mouth Every Night. Begin medication 2 days prior to starting radiation., Disp: 30 capsule, Rfl: 11  •  vitamin C (ASCORBIC ACID) 250 MG tablet, Take 1 tablet by mouth Daily., Disp: , Rfl:   •  multivitamin with minerals (MULTIVITAMIN ADULTS 50+ PO), Take 1 tablet by mouth Daily., Disp: , Rfl:   •  neomycin-bacitracin-polymyxin (NEOSPORIN) [phone] ophthalmic ointment, Apply to right eye twice daily as directed., Disp: 3.5 g, Rfl: 0  •  tamsulosin (FLOMAX) 0.4 MG capsule 24 hr capsule, Take 1 capsule by mouth Daily., Disp: 30 capsule, Rfl: 0  •  zolpidem (AMBIEN) 5 MG tablet, Take 1 Tablet by mouth nightly at bedtime as needed for sleep (Patient not taking: Reported on 2025), Disp: 20 tablet, Rfl: 1    The following portions of the patient's history were reviewed and updated as appropriate: allergies, current medications, past family history, past medical history, past social history, past surgical history and problem list.    Social History     Tobacco Use   • Smoking status: Former     Current packs/day: 0.00     Types: Cigarettes     Quit date:      Years since quittin.4     Passive exposure: Past   • Smokeless tobacco: Never   Vaping Use   • Vaping status: Never Used   Substance Use Topics   • Alcohol use: Yes     Comment: 3 oz    • Drug use: No     Review of Systems   Constitutional: Positive for fever. Negative for chills.   HENT:  Negative for nosebleeds and sore throat.    Respiratory:  Positive for cough. Negative for hemoptysis and wheezing.    Gastrointestinal:  Negative for abdominal pain,  "hematemesis, hematochezia, melena, nausea and vomiting.   Genitourinary:  Negative for dysuria and hematuria.   Neurological:  Negative for headaches.     Objective  Vitals:    05/19/25 1530   BP: 134/65   Pulse: 56   Resp: 16   SpO2: 96%   Weight: 79.2 kg (174 lb 9.6 oz)   Height: 175.3 cm (69\")     Body mass index is 25.78 kg/m².    Vitals reviewed.   Constitutional:       Appearance: Well-developed.   Eyes:      Conjunctiva/sclera: Conjunctivae normal.   HENT:      Head: Normocephalic.   Neck:      Thyroid: No thyromegaly.      Vascular: No JVD.      Trachea: No tracheal deviation.   Pulmonary:      Effort: No respiratory distress.      Breath sounds: Normal breath sounds. No wheezing. No rales.   Cardiovascular:      PMI at left midclavicular line. Normal rate. Regular rhythm. Normal S1. Normal S2.       Murmurs: There is no murmur.      No gallop.  No click. No rub.   Pulses:     Intact distal pulses.   Edema:     Peripheral edema absent.   Abdominal:      General: Bowel sounds are normal.      Palpations: Abdomen is soft. There is no abdominal mass.      Tenderness: There is no abdominal tenderness.   Musculoskeletal:      Cervical back: Normal range of motion and neck supple. Skin:     General: Skin is warm and dry.   Neurological:      Mental Status: Alert and oriented to person, place, and time.      Cranial Nerves: No cranial nerve deficit.         Lab Results   Component Value Date     04/09/2025    K 4.9 04/09/2025     04/09/2025    CO2 24.4 04/09/2025    BUN 18 04/09/2025    CREATININE 0.88 04/09/2025    GLUCOSE 99 04/09/2025    CALCIUM 9.8 04/09/2025    AST 17 04/09/2025    ALT 13 04/09/2025    ALKPHOS 82 04/09/2025     No results found for: \"CKTOTAL\"  Lab Results   Component Value Date    WBC 11.88 (H) 05/19/2025    HGB 12.4 (L) 05/19/2025    HCT 39.0 05/19/2025     05/19/2025     Lab Results   Component Value Date    INR 0.95 10/04/2024     Lab Results   Component Value Date    MG " 2.3 08/08/2023     Lab Results   Component Value Date    TSH 3.110 04/09/2025    PSA 2.790 03/17/2025    CHLPL 191 06/03/2016    TRIG 41 04/09/2025    HDL 65 (H) 04/09/2025    LDL 95 04/09/2025        ECG 12 Lead    Date/Time: 5/20/2025 1:07 PM  Performed by: Sinan Nam MD    Authorized by: Sinan Nam MD  Comparison: compared with previous ECG from 11/18/2024  Similar to previous ECG  Rhythm: sinus bradycardia  BPM: 56  Conduction: conduction normal  ST Segments: ST segments normal  T Waves: T waves normal            Assessment & Plan   Diagnosis Plan   1. Paroxysmal , maintaining sinus bradycardia (chronic and asymptomatic), clinically asymptomatic and stable on flecainide therapy.  [id number] score is 3 for his age and hypertension.        2. Nonproductive cough  CBC & Differential    XR Chest PA & Lateral    C-reactive Protein    Urinalysis With Microscopic - Urine, Clean Catch      3. Essential hypertension, controlled.          Recommendations  Continue with flecainide for his paroxysmal atrial fibrillation  Will order a CBC, chest x-ray, urinalysis, CRP to evaluate his cough and bodyaches.    Return in about 4 months (around 9/19/2025).    As always, Rafi  I appreciate very much the opportunity to participate in the cardiovascular care of your patients. Please do not hesitate to call me with any questions with regards to Henry Olea MD's evaluation and management.       With Best Regards,        Sinan Nam MD, [facility]    Please note that portions of this note were completed with a voice recognition program.

## 2025-05-19 NOTE — PROGRESS NOTES
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  In follow-up for paroxysmal atrial fibrillation.    History of Present Illness:  Lefty is a pleasant 84-year-old  male with history of paroxysmal atrial fibrillation for which she has been on flecainide.  He refused to take anticoagulation due to concern for bleeding.  He has been complaining of bodyaches and chills with nonproductive cough over the last 24 hours.  His temperature in the office today was 98.4 degrees F    No Known Allergies:    Current Outpatient Medications:     amLODIPine (NORVASC) 2.5 MG tablet, Take 2 tablets by mouth Daily. (Patient taking differently: Take 1 tablet by mouth Daily.), Disp: 180 tablet, Rfl: 3    aspirin 81 MG EC tablet, Take 1 tablet by mouth 2 (Two) Times a Day., Disp: , Rfl:     flecainide (TAMBOCOR) 50 MG tablet, Take 1/2 tablet by mouth 2 (Two) Times a Day., Disp: 30 tablet, Rfl: 6    GLUCOSAMINE-CHONDROITIN PO, Take 1 tablet by mouth Daily., Disp: , Rfl:     mometasone (ELOCON) 0.1 % cream, Apply topically once daily, Disp: 45 g, Rfl: 3    Omega-3 Fatty Acids (fish oil) 1000 MG capsule capsule, Take 1 capsule by mouth Daily With Breakfast., Disp: , Rfl:     pantoprazole (PROTONIX) 40 MG EC tablet, Take 1 tablet by mouth Daily., Disp: 90 tablet, Rfl: 4    tamsulosin (FLOMAX) 0.4 MG capsule 24 hr capsule, Take 1 capsule by mouth Every Night. Begin medication 2 days prior to starting radiation., Disp: 30 capsule, Rfl: 11    vitamin C (ASCORBIC ACID) 250 MG tablet, Take 1 tablet by mouth Daily., Disp: , Rfl:      "multivitamin with minerals (MULTIVITAMIN ADULTS 50+ PO), Take 1 tablet by mouth Daily., Disp: , Rfl:     neomycin-bacitracin-polymyxin (NEOSPORIN) [phone] ophthalmic ointment, Apply to right eye twice daily as directed., Disp: 3.5 g, Rfl: 0    tamsulosin (FLOMAX) 0.4 MG capsule 24 hr capsule, Take 1 capsule by mouth Daily., Disp: 30 capsule, Rfl: 0    zolpidem (AMBIEN) 5 MG tablet, Take 1 Tablet by mouth nightly at bedtime as needed for sleep (Patient not taking: Reported on 2025), Disp: 20 tablet, Rfl: 1    The following portions of the patient's history were reviewed and updated as appropriate: allergies, current medications, past family history, past medical history, past social history, past surgical history and problem list.    Social History     Tobacco Use    Smoking status: Former     Current packs/day: 0.00     Types: Cigarettes     Quit date:      Years since quittin.4     Passive exposure: Past    Smokeless tobacco: Never   Vaping Use    Vaping status: Never Used   Substance Use Topics    Alcohol use: Yes     Comment: 3 oz     Drug use: No     Review of Systems   Constitutional: Positive for fever. Negative for chills.   HENT:  Negative for nosebleeds and sore throat.    Respiratory:  Positive for cough. Negative for hemoptysis and wheezing.    Gastrointestinal:  Negative for abdominal pain, hematemesis, hematochezia, melena, nausea and vomiting.   Genitourinary:  Negative for dysuria and hematuria.   Neurological:  Negative for headaches.     Objective   Vitals:    25 1530   BP: 134/65   Pulse: 56   Resp: 16   SpO2: 96%   Weight: 79.2 kg (174 lb 9.6 oz)   Height: 175.3 cm (69\")     Body mass index is 25.78 kg/m².    Vitals reviewed.   Constitutional:       Appearance: Well-developed.   Eyes:      Conjunctiva/sclera: Conjunctivae normal.   HENT:      Head: Normocephalic.   Neck:      Thyroid: No thyromegaly.      Vascular: No JVD.      Trachea: No tracheal deviation.   Pulmonary: " "     Effort: No respiratory distress.      Breath sounds: Normal breath sounds. No wheezing. No rales.   Cardiovascular:      PMI at left midclavicular line. Normal rate. Regular rhythm. Normal S1. Normal S2.       Murmurs: There is no murmur.      No gallop.  No click. No rub.   Pulses:     Intact distal pulses.   Edema:     Peripheral edema absent.   Abdominal:      General: Bowel sounds are normal.      Palpations: Abdomen is soft. There is no abdominal mass.      Tenderness: There is no abdominal tenderness.   Musculoskeletal:      Cervical back: Normal range of motion and neck supple. Skin:     General: Skin is warm and dry.   Neurological:      Mental Status: Alert and oriented to person, place, and time.      Cranial Nerves: No cranial nerve deficit.         Lab Results   Component Value Date     04/09/2025    K 4.9 04/09/2025     04/09/2025    CO2 24.4 04/09/2025    BUN 18 04/09/2025    CREATININE 0.88 04/09/2025    GLUCOSE 99 04/09/2025    CALCIUM 9.8 04/09/2025    AST 17 04/09/2025    ALT 13 04/09/2025    ALKPHOS 82 04/09/2025     No results found for: \"CKTOTAL\"  Lab Results   Component Value Date    WBC 11.88 (H) 05/19/2025    HGB 12.4 (L) 05/19/2025    HCT 39.0 05/19/2025     05/19/2025     Lab Results   Component Value Date    INR 0.95 10/04/2024     Lab Results   Component Value Date    MG 2.3 08/08/2023     Lab Results   Component Value Date    TSH 3.110 04/09/2025    PSA 2.790 03/17/2025    CHLPL 191 06/03/2016    TRIG 41 04/09/2025    HDL 65 (H) 04/09/2025    LDL 95 04/09/2025        ECG 12 Lead    Date/Time: 5/20/2025 1:07 PM  Performed by: Sinan Nam MD    Authorized by: Sinan Nam MD  Comparison: compared with previous ECG from 11/18/2024  Similar to previous ECG  Rhythm: sinus bradycardia  BPM: 56  Conduction: conduction normal  ST Segments: ST segments normal  T Waves: T waves normal            Assessment & Plan    Diagnosis Plan   1. Paroxysmal , maintaining " sinus bradycardia (chronic and asymptomatic), clinically asymptomatic and stable on flecainide therapy.  [id number] score is 3 for his age and hypertension.        2. Nonproductive cough  CBC & Differential    XR Chest PA & Lateral    C-reactive Protein    Urinalysis With Microscopic - Urine, Clean Catch      3. Essential hypertension, controlled.          Recommendations  Continue with flecainide for his paroxysmal atrial fibrillation  Will order a CBC, chest x-ray, urinalysis, CRP to evaluate his cough and bodyaches.    Return in about 4 months (around 9/19/2025).    As always, Jimmy Zhang MD  I appreciate very much the opportunity to participate in the cardiovascular care of your patients. Please do not hesitate to call me with any questions with regards to Henry Olea MD's evaluation and management.       With Best Regards,        Sinan Nam MD, [facility]    Please note that portions of this note were completed with a voice recognition program.

## 2025-05-20 ENCOUNTER — LAB (OUTPATIENT)
Dept: LAB | Facility: HOSPITAL | Age: 85
End: 2025-05-20
Payer: MEDICARE

## 2025-05-20 ENCOUNTER — TRANSCRIBE ORDERS (OUTPATIENT)
Dept: ADMINISTRATIVE | Facility: HOSPITAL | Age: 85
End: 2025-05-20
Payer: MEDICARE

## 2025-05-20 ENCOUNTER — HOSPITAL ENCOUNTER (OUTPATIENT)
Dept: GENERAL RADIOLOGY | Facility: HOSPITAL | Age: 85
Discharge: HOME | End: 2025-05-20
Payer: MEDICARE

## 2025-05-20 DIAGNOSIS — I48.0 PAROXYSMAL ATRIAL FIBRILLATION: Primary | ICD-10-CM

## 2025-05-20 DIAGNOSIS — I48.0 PAROXYSMAL ATRIAL FIBRILLATION: ICD-10-CM

## 2025-05-20 DIAGNOSIS — R05.8 MILLERS' COUGH: Primary | ICD-10-CM

## 2025-05-20 DIAGNOSIS — R05.8 MILLERS' COUGH: ICD-10-CM

## 2025-05-20 LAB
ANION GAP SERPL CALCULATED.3IONS-SCNC: 10.5 MMOL/L (ref 5–15)
BACTERIA UR QL AUTO: NORMAL /HPF
BILIRUB UR QL STRIP: NEGATIVE
BUN SERPL-MCNC: 15 MG/DL (ref 8–23)
BUN/CREAT SERPL: 22.4 (ref 7–25)
CALCIUM SPEC-SCNC: 9.3 MG/DL (ref 8.6–10.5)
CHLORIDE SERPL-SCNC: 104 MMOL/L (ref 98–107)
CLARITY UR: CLEAR
CO2 SERPL-SCNC: 25.5 MMOL/L (ref 22–29)
COLOR UR: YELLOW
CREAT SERPL-MCNC: 0.67 MG/DL (ref 0.76–1.27)
EGFRCR SERPLBLD CKD-EPI 2021: 92.1 ML/MIN/1.73
GLUCOSE SERPL-MCNC: 111 MG/DL (ref 65–99)
GLUCOSE UR STRIP-MCNC: NEGATIVE MG/DL
HGB UR QL STRIP.AUTO: NEGATIVE
HYALINE CASTS UR QL AUTO: NORMAL /LPF
KETONES UR QL STRIP: NEGATIVE
LEUKOCYTE ESTERASE UR QL STRIP.AUTO: NEGATIVE
NITRITE UR QL STRIP: NEGATIVE
PH UR STRIP.AUTO: 7 [PH] (ref 5–8)
POTASSIUM SERPL-SCNC: 4.2 MMOL/L (ref 3.5–5.2)
PROT UR QL STRIP: NEGATIVE
RBC # UR STRIP: NORMAL /HPF
REF LAB TEST METHOD: NORMAL
SODIUM SERPL-SCNC: 140 MMOL/L (ref 136–145)
SP GR UR STRIP: 1.03 (ref 1–1.03)
SQUAMOUS #/AREA URNS HPF: NORMAL /HPF
UROBILINOGEN UR QL STRIP: NORMAL
WBC # UR STRIP: NORMAL /HPF

## 2025-05-20 PROCEDURE — 71046 X-RAY EXAM CHEST 2 VIEWS: CPT

## 2025-05-20 PROCEDURE — 93000 ELECTROCARDIOGRAM COMPLETE: CPT | Performed by: INTERNAL MEDICINE

## 2025-05-20 PROCEDURE — 80048 BASIC METABOLIC PNL TOTAL CA: CPT

## 2025-05-28 DIAGNOSIS — N41.1 PROSTATITIS, CHRONIC: Primary | ICD-10-CM

## 2025-05-28 RX ORDER — SULFAMETHOXAZOLE AND TRIMETHOPRIM 800; 160 MG/1; MG/1
1 TABLET ORAL 2 TIMES DAILY
Qty: 84 TABLET | Refills: 2 | Status: SHIPPED | OUTPATIENT
Start: 2025-05-28

## 2025-07-23 DIAGNOSIS — C61 PROSTATE CANCER: Primary | ICD-10-CM

## 2025-07-30 ENCOUNTER — LAB (OUTPATIENT)
Dept: LAB | Facility: HOSPITAL | Age: 85
End: 2025-07-30
Payer: MEDICARE

## 2025-07-30 PROCEDURE — 84153 ASSAY OF PSA TOTAL: CPT

## 2025-07-31 ENCOUNTER — RESULTS FOLLOW-UP (OUTPATIENT)
Dept: UROLOGY | Facility: CLINIC | Age: 85
End: 2025-07-31
Payer: MEDICARE

## 2025-07-31 LAB — PSA SERPL-MCNC: 3.2 NG/ML (ref 0–4)

## 2025-07-31 NOTE — TELEPHONE ENCOUNTER
----- Message from Parviz Manzanares sent at 7/31/2025  8:40 AM EDT -----  Please let patient know PSA did slightly increase but remains roughly stable.  Patient needs to keep follow-up on August 4 to review test results and go over further care plan.  Thank you  ----- Message -----  From: Lab, Background User  Sent: 7/31/2025   4:08 AM EDT  To: Parviz Manzanares PA-C

## 2025-08-04 ENCOUNTER — OFFICE VISIT (OUTPATIENT)
Dept: UROLOGY | Facility: CLINIC | Age: 85
End: 2025-08-04
Payer: MEDICARE

## 2025-08-04 VITALS
DIASTOLIC BLOOD PRESSURE: 64 MMHG | BODY MASS INDEX: 25.62 KG/M2 | HEIGHT: 69 IN | SYSTOLIC BLOOD PRESSURE: 141 MMHG | HEART RATE: 51 BPM | WEIGHT: 173 LBS

## 2025-08-04 DIAGNOSIS — C61 PROSTATE CANCER: Primary | ICD-10-CM

## 2025-08-04 PROBLEM — N40.1 BENIGN PROSTATIC HYPERPLASIA WITH URINARY HESITANCY: Status: RESOLVED | Noted: 2023-02-23 | Resolved: 2025-08-04

## 2025-08-04 PROBLEM — R39.11 BENIGN PROSTATIC HYPERPLASIA WITH URINARY HESITANCY: Status: RESOLVED | Noted: 2023-02-23 | Resolved: 2025-08-04

## 2025-08-04 PROCEDURE — 99213 OFFICE O/P EST LOW 20 MIN: CPT

## 2025-08-04 PROCEDURE — 1159F MED LIST DOCD IN RCRD: CPT

## 2025-08-04 PROCEDURE — 1160F RVW MEDS BY RX/DR IN RCRD: CPT
